# Patient Record
Sex: FEMALE | Race: WHITE | NOT HISPANIC OR LATINO | Employment: UNEMPLOYED | URBAN - METROPOLITAN AREA
[De-identification: names, ages, dates, MRNs, and addresses within clinical notes are randomized per-mention and may not be internally consistent; named-entity substitution may affect disease eponyms.]

---

## 2021-01-21 ENCOUNTER — TELEPHONE (OUTPATIENT)
Dept: GASTROENTEROLOGY | Facility: CLINIC | Age: 26
End: 2021-01-21

## 2021-01-21 ENCOUNTER — OFFICE VISIT (OUTPATIENT)
Dept: GASTROENTEROLOGY | Facility: CLINIC | Age: 26
End: 2021-01-21
Payer: COMMERCIAL

## 2021-01-21 VITALS
BODY MASS INDEX: 18.68 KG/M2 | WEIGHT: 89 LBS | SYSTOLIC BLOOD PRESSURE: 110 MMHG | DIASTOLIC BLOOD PRESSURE: 72 MMHG | HEART RATE: 73 BPM | HEIGHT: 58 IN

## 2021-01-21 DIAGNOSIS — K58.1 IRRITABLE BOWEL SYNDROME WITH CONSTIPATION: Primary | ICD-10-CM

## 2021-01-21 DIAGNOSIS — R63.4 WEIGHT LOSS: ICD-10-CM

## 2021-01-21 DIAGNOSIS — K64.8 OTHER HEMORRHOIDS: ICD-10-CM

## 2021-01-21 DIAGNOSIS — R10.10 UPPER ABDOMINAL PAIN: ICD-10-CM

## 2021-01-21 PROCEDURE — 99204 OFFICE O/P NEW MOD 45 MIN: CPT | Performed by: NURSE PRACTITIONER

## 2021-01-21 RX ORDER — POLYETHYLENE GLYCOL 3350 17 G/17G
17 POWDER, FOR SOLUTION ORAL DAILY PRN
COMMUNITY
End: 2021-09-17

## 2021-01-21 NOTE — PROGRESS NOTES
[address] - Outpatient Consultation  Donna Bonilla 31 y o  female MRN: [id number]  Encounter: [id number]    ASSESSMENT AND PLAN:      1  Irritable bowel syndrome with constipation  History of irritable bowel syndrome that was diagnosed several years ago at [facility] [address] [facility]  She has occasional lower abdominal pain and cramping that occurs on a daily basis  She typically has constipation and has tried Lindalee Bhargavi in the past and is now on Dulcolax and MiraLax due to breast-feeding  She is moving her bowels on a regular basis  She did have a colonoscopy last year and reports that they identified internal hemorrhoids but was otherwise a normal colonoscopy  - continue Dulcolax and MiraLax  - increase fluid intake  - high-fiber diet  - trial 1-2 fiber gummies daily  - consider antispasmodic medications when patient is no longer breastfeeding  - will attempt to obtain records from previous colonoscopy     2  Upper abdominal pain  Patient reports epigastric and upper abdominal pain that occasionally radiates to her back  She reports that pain is worse postprandial and associated with nausea  She does also have a history of GERD but is not on any regular medications  Differential diagnoses include GERD, biliary disease     - will check US abdomen complete; Future  - encouraged small, frequent meals  - bland diet for now    3  Other hemorrhoids  Patient reports history of external hemorrhoids that have been intermittent since her pregnancy  She also has a history of internal hemorrhoids as well  She does use hemorrhoidal cream, tucks pads as needed  Denies any rectal bleeding     - continue hemorrhoidal cream as needed  - continue tucks pads as needed  - Sitz baths 2-3 times per day  - increase fluid intake  - high-fiber diet  - trial 1-2 fiber gummies daily    4   Weight loss  She has experienced an unintentional weight loss over the past several months after giving birth her daughter  She currently is 89 lb and she typically weighs  lb  Will check:   - Comprehensive metabolic panel; Future  - CBC and differential; Future  - monitor weight closely and will reassess at follow up       Followup Appointment: 2-3 months   ______________________________________________________________________    Chief Complaint   Patient presents with    Constipation    Abd pain     mid upper and lower left; radiates to back       HPI:   Azucena Bacon is a 31y o  year old female who presents to the office today as a new patient  [facility]  She reports that she was diagnosed with irritable bowel syndrome with constipation several years ago  She reports that she previously was on Linzess but is now breast-feeding and has been switched to MiraLax and Dulcolax  She typically moves her bowels every day  She does have occasional lower abdominal pain and cramping that radiates to her back, pain improves with defecation  She also has been experiencing epigastric pain, nausea and states that the pain is worse postprandial   She does have a history of heartburn and reflux but is not currently taking any medication on a regular basis for GERD  She denies any fever, chills, dysphagia, odynophagia, early satiety, vomiting, hematochezia, melena  Her appetite is stable  She does report approximately 6 lb weight loss in the past several months since the birth of her daughter       Historical Information   Past Medical History:   Diagnosis Date    Irritable bowel syndrome     Syncope      Past Surgical History:   Procedure Laterality Date     SECTION      COLONOSCOPY      TONSILLECTOMY       Social History     Substance and Sexual Activity   Alcohol Use Not Currently     Social History     Substance and Sexual Activity   Drug Use Never     Social History     Tobacco Use   Smoking Status Never Smoker   Smokeless Tobacco Never Used     Family History   Adopted: Yes       Meds/Allergies     Current Outpatient Medications:     Magnesium Hydroxide (DULCOLAX PO)    polyethylene glycol (MIRALAX) 17 g packet    No Known Allergies    PHYSICAL EXAM:    Blood pressure 110/72, pulse 73, height 4' 9 75" (1 467 m), weight 40 4 kg (89 lb)  Body mass index is 18 76 kg/m²  General Appearance: NAD, cooperative, alert  Eyes: Anicteric, PERRLA, EOMI  ENT:  Normocephalic, atraumatic, normal mucosa  Neck:  Supple, symmetrical, trachea midline,   Resp:  Clear to auscultation bilaterally; no rales, rhonchi or wheezing; respirations unlabored   CV:  S1 S2, Regular rate and rhythm; no murmur, rub, or gallop  GI:  Soft, +mild lower abdominal ttp, no guarding or rebound, normal bowel sounds; no masses, no organomegaly   Rectal: Deferred  Musculoskeletal: No cyanosis, clubbing or edema  Normal ROM  Skin:  No jaundice, rashes, or lesions   Heme/Lymph: No palpable cervical lymphadenopathy  Psych: Normal affect, good eye contact  Neuro: No gross deficits, AAOx3    Lab Results:   No results found for: WBC, HGB, HCT, MCV, PLT  No results found for: NA, K, CL, CO2, ANIONGAP, BUN, CREATININE, GLUCOSE, GLUF, CALCIUM, CORRECTEDCA, AST, ALT, ALKPHOS, PROT, BILITOT, EGFR  No results found for: IRON, TIBC, FERRITIN  No results found for: LIPASE    Radiology Results:   No results found  REVIEW OF SYSTEMS:    CONSTITUTIONAL: Denies any fever, chills, rigors, and weight loss  HEENT: No earache or tinnitus  Denies hearing loss or visual disturbances  CARDIOVASCULAR: No chest pain or palpitations  RESPIRATORY: Denies any cough, hemoptysis, shortness of breath or dyspnea on exertion  GASTROINTESTINAL: As noted in the History of Present Illness  GENITOURINARY: No problems with urination  Denies any hematuria or dysuria  NEUROLOGIC: No dizziness or vertigo, denies headaches  MUSCULOSKELETAL: Denies any muscle or joint pain  SKIN: Denies skin rashes or itching     ENDOCRINE: Denies excessive thirst  Denies intolerance to heat or cold  PSYCHOSOCIAL: Denies depression or anxiety  Denies any recent memory loss

## 2021-01-21 NOTE — PATIENT INSTRUCTIONS
Trial fiber gummies (1-2 daily)    Increase fluid intake   Check labs and ultrasound     Follow up in 2-3 months         High Fiber Diet   WHAT YOU NEED TO KNOW:   A high-fiber diet includes foods that have a high amount of fiber  Fiber is the part of fruits, vegetables, and grains that is not broken down by your body  Fiber keeps your bowel movements regular  Fiber can also help lower your cholesterol level, control blood sugar in people with diabetes, and relieve constipation  Fiber can also help you control your weight because it helps you feel full faster  Most adults should eat 25 to 35 grams of fiber each day  Talk to your dietitian or healthcare provider about the amount of fiber you need  DISCHARGE INSTRUCTIONS:   Good sources of fiber:       · Foods with at least 4 grams of fiber per serving:      ? ? to ½ cup of high-fiber cereal (check the nutrition label on the box)    ? ½ cup of blackberries or raspberries    ? 4 dried prunes    ? 1 cooked artichoke    ? ½ cup of cooked legumes, such as lentils, or red, kidney, and pastrana beans    · Foods with 1 to 3 grams of fiber per serving:      ? 1 slice of whole-wheat, pumpernickel, or rye bread    ? ½ cup of cooked brown rice    ? 4 whole-wheat crackers    ? 1 cup of oatmeal    ? ½ cup of cereal with 1 to 3 grams of fiber per serving (check the nutrition label on the box)    ? 1 small piece of fruit, such as an apple, banana, pear, kiwi, or orange    ? 3 dates    ? ½ cup of canned apricots, fruit cocktail, peaches, or pears    ? ½ cup of raw or cooked vegetables, such as carrots, cauliflower, cabbage, spinach, squash, or corn  Ways that you can increase fiber in your diet:   · Choose brown or wild rice instead of white rice  · Use whole wheat flour in recipes instead of white or all-purpose flour  · Add beans and peas to casseroles or soups  · Choose fresh fruit and vegetables with peels or skins on instead of juices      Other diet guidelines to follow:   · Add fiber to your diet slowly  You may have abdominal discomfort, bloating, and gas if you add fiber to your diet too quickly  · Drink plenty of liquids as you add fiber to your diet  You may have nausea or develop constipation if you do not drink enough water  Ask how much liquid to drink each day and which liquids are best for you  © Copyright [address] Information is for End User's use only and may not be sold, redistributed or otherwise used for commercial purposes  All illustrations and images included in CareNotes® are the copyrighted property of A D A M , Inc  or [address] [place], [place] [address]karly   The above information is an  only  It is not intended as medical advice for individual conditions or treatments  Talk to your doctor, nurse or pharmacist before following any medical regimen to see if it is safe and effective for you

## 2021-07-21 ENCOUNTER — INITIAL PRENATAL (OUTPATIENT)
Dept: OBGYN CLINIC | Facility: CLINIC | Age: 26
End: 2021-07-21
Payer: COMMERCIAL

## 2021-07-21 VITALS
HEIGHT: 59 IN | DIASTOLIC BLOOD PRESSURE: 50 MMHG | BODY MASS INDEX: 17.54 KG/M2 | WEIGHT: 87 LBS | SYSTOLIC BLOOD PRESSURE: 100 MMHG

## 2021-07-21 DIAGNOSIS — O34.211 MATERNAL CARE DUE TO LOW TRANSVERSE UTERINE SCAR FROM PREVIOUS CESAREAN DELIVERY: Primary | ICD-10-CM

## 2021-07-21 DIAGNOSIS — Z36.87 UNSURE OF LMP (LAST MENSTRUAL PERIOD) AS REASON FOR ULTRASOUND SCAN: ICD-10-CM

## 2021-07-21 DIAGNOSIS — Z36.9 ENCOUNTER FOR ANTENATAL SCREENING: Primary | ICD-10-CM

## 2021-07-21 DIAGNOSIS — O09.891 SHORT INTERVAL BETWEEN PREGNANCIES AFFECTING PREGNANCY IN FIRST TRIMESTER, ANTEPARTUM: ICD-10-CM

## 2021-07-21 DIAGNOSIS — Z3A.09 9 WEEKS GESTATION OF PREGNANCY: ICD-10-CM

## 2021-07-21 LAB
EXTERNAL CHLAMYDIA SCREEN: NEGATIVE
EXTERNAL GONORRHEA SCREEN: NEGATIVE
SL AMB  POCT GLUCOSE, UA: NEGATIVE
SL AMB POCT URINE PROTEIN: NEGATIVE

## 2021-07-21 PROCEDURE — PNV: Performed by: OBSTETRICS & GYNECOLOGY

## 2021-07-21 PROCEDURE — 76817 TRANSVAGINAL US OBSTETRIC: CPT | Performed by: OBSTETRICS & GYNECOLOGY

## 2021-07-21 PROCEDURE — OBC: Performed by: OBSTETRICS & GYNECOLOGY

## 2021-07-21 NOTE — PROGRESS NOTES
Patient here for first prenatal visit  She denies spotting  She does have some nausea and vomiting  She declined genetic and carrier screening test  Her preferred lab is [facility]  Prenatal booklet and lab provided  Teaching provided and all questions answered

## 2021-07-21 NOTE — PATIENT INSTRUCTIONS
Pregnancy   AMBULATORY CARE:   What you need to know about pregnancy:  A normal pregnancy lasts about 40 weeks  The first trimester lasts from your last period through the 12th week of pregnancy  The second trimester lasts from the 13th week through the 23rd week  The third trimester lasts from the 24th week until your baby is born  If you know the date of your last period, your healthcare provider can estimate your due date  You may give birth to your baby any time from 37 weeks to 2 weeks after your due date  Seek care immediately if:   · You develop a severe headache that does not go away  · You have new or increased vision changes, such as blurred or spotted vision  · You have new or increased swelling in your face or hands  · You have pain or cramping in your abdomen or low back  · You have vaginal bleeding  Call your doctor or obstetrician if:   · You have abdominal cramps, pressure, or tightening  · You have a change in vaginal discharge  · You cannot keep food or drinks down, and you are losing weight  · You have chills or a fever  · You have vaginal itching, burning, or pain  · You have yellow, green, white, or foul-smelling vaginal discharge  · You have pain or burning when you urinate, less urine than usual, or pink or bloody urine  · You have questions or concerns about your condition or care  Prenatal care:  Prenatal care is a series of visits with your healthcare provider throughout your pregnancy  Prenatal care can help prevent problems during pregnancy and childbirth  At each prenatal visit, your provider will weigh you and check your blood pressure  He or she will also check your baby's heartbeat and growth  You may also need the following at some visits:  · A pelvic exam  allows your healthcare provider to see your cervix (the bottom part of your uterus)  Your healthcare provider will use a speculum to open your vagina   He or she will check the size and shape of your uterus  At your first prenatal visit, you may also have a Pap smear  This is a test to check your cervix for abnormal cells  · Blood tests  may be done to check for any of the following:     ? Gestational diabetes or anemia (low iron level)    ? Blood type or Rh factor, or certain birth defects    ? Immunity to certain diseases, such as chickenpox or rubella    ? An infection, such as a sexually transmitted infection, HIV, or hepatitis B    · Hepatitis B  may need to be prevented or treated  Hepatitis B is inflammation of the liver caused by the hepatitis B virus (HBV)  HBV can spread from a mother to her baby during delivery  You will be checked for HBV as early as possible in the first trimester of each pregnancy  You need the test even if you received the hepatitis B vaccine or were tested before  You may need to have an HBV infection treated before you give birth  · Urine tests  may also be done to check for sugar and protein  These can be signs of gestational diabetes or preeclampsia  Urine tests may also be done to check for signs of infection  · A fetal ultrasound  shows pictures of your baby inside your uterus  The pictures are used to check your baby's development, movement, and position  · Genetic disorder screening tests  may be offered to you  These tests check your baby's risk for genetic disorders such as Down syndrome  A screening test may include blood tests and an ultrasound  Blood tests may be used to check your DNA or your partner's DNA  Genetic tests are not always accurate or complete  Your baby may be born with a genetic disorder that did not show up in the tests  Talk to your healthcare provider about any concerns you have with genetic testing  Body changes that may occur during your pregnancy:   · Breast changes  you will experience include tenderness and tingling during the early part of your pregnancy  Your breasts will become larger   You may need to use a support bra  You may see a thin, yellow fluid, called colostrum, leak from your nipples during the second trimester  Colostrum is a liquid that changes to milk about 3 days after you give birth  · Skin changes and stretch marks  may occur during your pregnancy  You may have red marks, called stretch marks, on your skin  Stretch marks will usually fade after pregnancy  Use lotion if your skin is dry and itchy  The skin on your face, around your nipples, and below your belly button may darken  Most of the time, your skin will return to its normal color after your baby is born  · Morning sickness  is nausea and vomiting that can happen at any time of day  Avoid fatty and spicy foods  Eat small meals throughout the day instead of large meals  Meme may help to decrease nausea  Ask your healthcare provider about other ways of decreasing nausea and vomiting  · Heartburn  may be caused by changes in your hormones during pregnancy  Your growing uterus may also push your stomach upward and force stomach acid to back up into your esophagus  Eat 4 or 5 small meals each day instead of large meals  Avoid spicy foods  Avoid eating right before bedtime  · Constipation  may develop during your pregnancy  To treat constipation, eat foods high in fiber such as fiber cereals, beans, fruits, vegetables, whole-grain breads, and prune juice  Get regular exercise and drink plenty of water  Your healthcare provider may also suggest a fiber supplement to soften your bowel movements  Talk to your healthcare provider before you use any medicines to decrease constipation  · Hemorrhoids  are enlarged veins in the rectal area  They may cause pain, itching, and bright red bleeding from your rectum  To decrease your risk for hemorrhoids, prevent constipation and do not strain to have a bowel movement  If you have hemorrhoids, soak in a tub of warm water to ease discomfort   Ask your healthcare provider how you can treat hemorrhoids  · Leg cramps and swelling  may be caused by low calcium levels or the added weight of pregnancy  Raise your legs above the level of your heart to decrease swelling  During a leg cramp, stretch or massage the muscle that has the cramp  Heat may help decrease pain and muscle spasms  Apply heat on your muscle for 20 to 30 minutes every 2 hours for as many days as directed  · Back pain  may occur as your baby grows  Do not stand for long periods of time or lift heavy items  Use good posture while you stand, squat, or bend  Wear low-heeled shoes with good support  Rest may also help to relieve back pain  Ask your healthcare provider about exercises you can do to strengthen your back muscles  Stay healthy during your pregnancy:   · Eat a variety of healthy foods  Healthy foods include fruits, vegetables, whole-grain breads, low-fat dairy foods, beans, lean meats, and fish  Drink liquids as directed  Ask how much liquid to drink each day and which liquids are best for you  Limit caffeine to less than 200 milligrams each day  Limit your intake of fish to 2 servings each week  Choose fish low in mercury such as canned light tuna, shrimp, crab, salmon, cod, or tilapia  Do not  eat fish high in mercury such as swordfish, tilefish, aleksandar mackerel, and shark  · Take prenatal vitamins as directed  Your need for certain vitamins and minerals, such as folic acid, increases during pregnancy  Prenatal vitamins provide some of the extra vitamins and minerals you need  Prenatal vitamins may also help to decrease the risk for certain birth defects  · Ask how much weight you should gain during your pregnancy  Too much or too little weight gain can be unhealthy for you and your baby  · Talk to your healthcare provider about exercise  Moderate exercise can help you stay fit  Your healthcare provider will help you plan an exercise program that is safe for you during pregnancy           · Do not smoke   Smoking increases your risk for a miscarriage and heart and blood vessel problems  Smoking can cause your baby to be born too early or weigh less at birth  Quit smoking as soon as you think you might be pregnant  Ask your healthcare provider for information if you need help quitting  · Do not drink alcohol  Alcohol passes from your body to your baby through the placenta  It can affect your baby's brain development and cause fetal alcohol syndrome (FAS)  FAS is a group of conditions that causes mental, behavior, and growth problems  · Talk to your healthcare provider before you take any medicines  Many medicines may harm your baby if you take them when you are pregnant  Do not take any medicines, vitamins, herbs, or supplements without first talking to your healthcare provider  Never use illegal or street drugs (such as marijuana or cocaine) while you are pregnant  Safety tips:   · Avoid hot tubs and saunas  Do not use a hot tub or sauna while you are pregnant, especially during your first trimester  Hot tubs and saunas may raise your baby's temperature and increase the risk for birth defects  · Avoid toxoplasmosis  This is an infection caused by eating raw meat or being around infected cat feces  It can cause birth defects, miscarriages, and other problems  Wash your hands after you touch raw meat  Make sure any meat is well-cooked before you eat it  Avoid raw eggs and unpasteurized milk  Use gloves or ask someone else to clean your cat's litter box while you are pregnant  · Ask your healthcare provider about travel  The most comfortable time to travel is during the second trimester  Ask your healthcare provider if you can travel after 36 weeks  You may not be able to travel in an airplane after 36 weeks  He or she may also recommend that you avoid long road trips  Follow up with your doctor or obstetrician as directed:  Go to all of your prenatal visits during your pregnancy   Write down your questions so you remember to ask them during your visits  © Copyright [employer] 2021 Information is for End User's use only and may not be sold, redistributed or otherwise used for commercial purposes  All illustrations and images included in CareNotes® are the copyrighted property of [employer] D A M , Inc  or Antonietta Patrick  The above information is an  only  It is not intended as medical advice for individual conditions or treatments  Talk to your doctor, nurse or pharmacist before following any medical regimen to see if it is safe and effective for you  Nausea and Vomiting in Pregnancy   AMBULATORY CARE:   Nausea and vomiting in pregnancy  can happen any time of day  These symptoms usually start before the 9th week of pregnancy, and end by the 14th week (second trimester)  Some women can have nausea and vomiting for a longer time  These symptoms can affect some women throughout the entire pregnancy  Nausea and vomiting do not harm your baby  These symptoms can make it hard for you to do your daily activities  Seek care immediately if:   · You have signs of dehydration  Examples are dark yellow urine, dry mouth and lips, dry skin, fast heartbeat, and urinating less than usual     · You have severe abdominal pain  · You feel too weak or dizzy to stand up  · You see blood in your vomit or bowel movements  Contact your healthcare provider if:   · You vomit more than 4 times in 1 day  · You have not been able to keep liquids down for more than 1 day  · You lose more than 2 pounds  · You have a fever  · Your nausea and vomiting continue longer than 14 weeks  · You have questions or concerns about your condition or care  Treatment  for nausea and vomiting in pregnancy is usually not needed  You can make changes in the foods you eat and in your activities to help manage your symptoms  You may need to try several things to learn what works for you   Talk to your healthcare provider if your symptoms do not decrease with the changes suggested below  You may need vitamin B6 and medicine if these changes do not help, or your symptoms become severe  Nutrition changes you can make to manage nausea and vomiting:   · Eat small meals throughout the day instead of 3 large meals  You may be more likely to have nausea and vomiting when your stomach is empty  Eat foods that are low in fat and high in protein  Examples are lean meat, beans, turkey, and chicken without the skin  Eat a small snack, such as crackers, dry cereal, or a small sandwich before you go to bed  · Eat some crackers or dry toast before you get out of bed in the morning  Get out of bed slowly  Sudden movements could cause you to get dizzy and nauseated  · Eat bland foods when you feel nauseated  Examples of bland foods include dry toast, dry cereal, plain pasta, white rice, and bread  Other bland foods include saltine crackers, bananas, gelatin, and pretzels  Avoid spicy, greasy, and fried foods  Avoid any other foods that make you feel nauseated  · Drink liquids that contain ginger  Drink ginger ale made with real ginger or ginger tea made with fresh grated ginger  Ginger capsules or ginger candies may also help to decrease nausea and vomiting  · Drink liquids between meals instead of with meals  Wait at least 30 minutes after you eat to drink liquids  Drink small amounts of liquids often throughout the day to prevent dehydration  Ask how much liquid you should drink each day  Other changes you can make to manage nausea and vomiting:   · Avoid smells that bother you  Strong odors may cause nausea and vomiting to start, or make it worse  Take a short walk, turn on a fan, or try to sleep with the window open to get fresh air  When you are cooking, open windows to get rid of smells that may cause nausea  · Do not brush your teeth right after you eat  if it makes you nauseated      · Rest when you need to  Start activity slowly and work up to your usual routine as you start to feel better  · Talk to your healthcare provider about your prenatal vitamins  Prenatal vitamins can cause nausea for some women  Try taking your prenatal vitamin at night or with a snack  If this change does not help, your healthcare provider may recommend a different type of vitamin  · Do not use any medicines, vitamins, or supplements to manage your symptoms without asking your healthcare provider  Many medicines can harm an unborn baby  · Light to moderate exercise  may help to decrease your symptoms  It may also help you to sleep better at night  Ask your healthcare provider about the best exercise plan for you  Follow up with your healthcare provider as directed:  Write down your questions so you remember to ask them during your visits  © Copyright [employer] 2021 Information is for End User's use only and may not be sold, redistributed or otherwise used for commercial purposes  All illustrations and images included in CareNotes® are the copyrighted property of A D [employer] M , Inc  or [facility]   The above information is an  only  It is not intended as medical advice for individual conditions or treatments  Talk to your doctor, nurse or pharmacist before following any medical regimen to see if it is safe and effective for you

## 2021-07-24 LAB
C TRACH RRNA SPEC QL NAA+PROBE: NEGATIVE
N GONORRHOEA RRNA SPEC QL NAA+PROBE: NEGATIVE

## 2021-08-09 ENCOUNTER — TELEPHONE (OUTPATIENT)
Dept: PERINATAL CARE | Facility: CLINIC | Age: 26
End: 2021-08-09

## 2021-08-09 NOTE — TELEPHONE ENCOUNTER
Phone call to patient and left M to confirm MFM NT US appt  Explained [facility] has a video to view prior to appt that will explain genetic testing verus screening and information on how to check insurance coverage for specialty genetic labs  Please review this information prior to your C/ [address] appointment @:    [facility]/mfmgenetics    This video link can be sent via [employer] , call # 6-205-gwhpvfo, option # 5 for [facility] technical support for help to set up your account    Rasheeda Gutierrez is active please call [facility] and we will send the link  Call [phone] [facility] nurse line any questions

## 2021-08-16 ENCOUNTER — TELEPHONE (OUTPATIENT)
Dept: PERINATAL CARE | Facility: CLINIC | Age: 26
End: 2021-08-16

## 2021-08-18 ENCOUNTER — TELEPHONE (OUTPATIENT)
Dept: PERINATAL CARE | Facility: CLINIC | Age: 26
End: 2021-08-18

## 2021-08-18 NOTE — TELEPHONE ENCOUNTER
Pt called - her COVID test was negative, she was exposed to COVID+ person on her vacation, over 5 days ago  Pt calling to reschedule her NT appt  No NT appts available before deadline of FRI 8/20/21  Offered pt appt with Genetic Counselor 8/23/21, pt declined  Pt request we call her if any NT appts become available this week

## 2021-08-20 ENCOUNTER — ROUTINE PRENATAL (OUTPATIENT)
Dept: PERINATAL CARE | Facility: CLINIC | Age: 26
End: 2021-08-20
Payer: COMMERCIAL

## 2021-08-20 ENCOUNTER — ROUTINE PRENATAL (OUTPATIENT)
Dept: OBGYN CLINIC | Facility: CLINIC | Age: 26
End: 2021-08-20

## 2021-08-20 VITALS — BODY MASS INDEX: 18.08 KG/M2 | DIASTOLIC BLOOD PRESSURE: 58 MMHG | SYSTOLIC BLOOD PRESSURE: 86 MMHG | WEIGHT: 88 LBS

## 2021-08-20 VITALS
BODY MASS INDEX: 17.54 KG/M2 | HEART RATE: 76 BPM | DIASTOLIC BLOOD PRESSURE: 66 MMHG | SYSTOLIC BLOOD PRESSURE: 106 MMHG | HEIGHT: 59 IN | WEIGHT: 87 LBS

## 2021-08-20 DIAGNOSIS — O34.219 HISTORY OF CESAREAN DELIVERY, ANTEPARTUM: ICD-10-CM

## 2021-08-20 DIAGNOSIS — Z36.9 ANTENATAL SCREENING ENCOUNTER: ICD-10-CM

## 2021-08-20 DIAGNOSIS — Z87.59 HISTORY OF PRIOR PREGNANCY WITH SMALL FOR GESTATIONAL AGE NEWBORN: ICD-10-CM

## 2021-08-20 DIAGNOSIS — K21.00 GASTROESOPHAGEAL REFLUX DISEASE WITH ESOPHAGITIS, UNSPECIFIED WHETHER HEMORRHAGE: ICD-10-CM

## 2021-08-20 DIAGNOSIS — Z36.82 ENCOUNTER FOR (NT) NUCHAL TRANSLUCENCY SCAN: Primary | ICD-10-CM

## 2021-08-20 DIAGNOSIS — O34.211 MATERNAL CARE DUE TO LOW TRANSVERSE UTERINE SCAR FROM PREVIOUS CESAREAN DELIVERY: ICD-10-CM

## 2021-08-20 DIAGNOSIS — Z3A.09 9 WEEKS GESTATION OF PREGNANCY: ICD-10-CM

## 2021-08-20 DIAGNOSIS — O09.891 SHORT INTERVAL BETWEEN PREGNANCIES AFFECTING PREGNANCY IN FIRST TRIMESTER, ANTEPARTUM: ICD-10-CM

## 2021-08-20 DIAGNOSIS — Z3A.13 13 WEEKS GESTATION OF PREGNANCY: Primary | ICD-10-CM

## 2021-08-20 DIAGNOSIS — Z3A.13 13 WEEKS GESTATION OF PREGNANCY: ICD-10-CM

## 2021-08-20 PROBLEM — R55 SYNCOPE: Status: ACTIVE | Noted: 2021-08-20

## 2021-08-20 PROBLEM — K21.9 GASTROESOPHAGEAL REFLUX DISEASE: Status: RESOLVED | Noted: 2021-08-20 | Resolved: 2021-08-20

## 2021-08-20 PROBLEM — K21.9 GASTROESOPHAGEAL REFLUX DISEASE: Status: ACTIVE | Noted: 2021-08-20

## 2021-08-20 LAB
SL AMB  POCT GLUCOSE, UA: NORMAL
SL AMB POCT URINE PROTEIN: NORMAL

## 2021-08-20 PROCEDURE — 76813 OB US NUCHAL MEAS 1 GEST: CPT | Performed by: OBSTETRICS & GYNECOLOGY

## 2021-08-20 PROCEDURE — PNV: Performed by: OBSTETRICS & GYNECOLOGY

## 2021-08-20 PROCEDURE — 99242 OFF/OP CONSLTJ NEW/EST SF 20: CPT | Performed by: OBSTETRICS & GYNECOLOGY

## 2021-08-20 PROCEDURE — 76801 OB US < 14 WKS SINGLE FETUS: CPT | Performed by: OBSTETRICS & GYNECOLOGY

## 2021-08-20 NOTE — LETTER
2021     MD Lacie [address]  [address] [facility] [address]  [facility]    Patient: Olivia Garrison   YOB: 1995   Date of Visit: 2021       Dear Dr Angela Conteh: Thank you for referring Olivia Garrison to me for evaluation  Below are my notes for this consultation  If you have questions, please do not hesitate to call me  I look forward to following your patient along with you  Sincerely,        Rebecca Peabody, MD        CC: No Recipients  Rebecca Peabody, MD  2021  3:20 PM  Sign when Signing Visit  [facility]: Ms Sylvia Anderson was seen today at 13w5d for nuchal translucency ultrasound  See ultrasound report under "OB Procedures" tab  My recommendations are as follows:  1  We reviewed the availability of genetic screening, as well as diagnostic testing, which are available to all pregnant women  We reviewed limitations, risks, and benefits of screening and testing  She does not wish to pursue aneuploidy screening or diagnostic testing at this time  MSAFP screening should be ordered through your office at 15-20 weeks gestation, and completed prior to fetal anatomic survey  A detailed anatomic survey as well as transvaginal cervical length screening are recommended between 18-22 weeks gestation  2  Given short inter-pregnancy interval and history of a small for gestational age , periodic assessment of fetal growth is advised in the second half of her pregnancy  3  We discussed that vaccination against [place] is recommended for pregnant and lactating women by the [employer] of Obstetricians and Gynecologists, and the Society for Maternal-Fetal Medicine  We discussed reassuring pregnancy outcome information after vaccination from [facility]'S [place] V-Safe registry  She declines vaccination at this time       Please don't hesitate to contact our office with any concerns or questions     -Rebecca Peabody, MD

## 2021-08-20 NOTE — PATIENT INSTRUCTIONS
SARS CoV2 (COVID-19) Vaccination is now recommended for pregnant and lactating women by the [employer] of Obstetricians and Gynecologists, as well as by the Society for Maternal-Fetal Medicine, based on studies demonstrating protective effects in pregnant women and neonates following vaccination, as well as no increased risk of pregnancy complications following vaccination  We have the most safety data on mRNA vaccines (Ciera Wright) at this time  Your baby will benefit the most from your antibodies if you are fully vaccinated prior to delivery  I recommend you consider vaccination if you have not already  If you have any questions or concerns about this, we are happy to discuss it further  Thank you for choosing [address] [place] [address] for your visit today  We appreciate your trust and the opportunity to assist your obstetrician with your care  We value your feedback regarding the care we are providing  Following today's appointment, you may receive a patient satisfaction survey by mail or e-mail requesting feedback on your visit  We ask that you complete the survey to  help us understand how we are doing  Thank you for in advance for your feedback

## 2021-08-20 NOTE — PROGRESS NOTES
On arrival to the office, Latricia Nickerson and her  admit to having been in [place], with return to the area on 8/14/21 and were exposed to someone who has tested positive for covid on 8/10/21  Latricia Nickerson and her child were both tested on 8/15/21 and were negative  All three, Latricia Nickerson, her  and child have been asymptomatic  Latricia Nickerson and her  are wearing masks over their mouth and nose properly  Discussed with Dr Brayan Feldman prior to putting them in an ultrasound room

## 2021-08-20 NOTE — PROGRESS NOTES
Babak Ferguson: Ms Anthony Carolina was seen today at 13w5d for nuchal translucency ultrasound  See ultrasound report under "OB Procedures" tab  My recommendations are as follows:  1  We reviewed the availability of genetic screening, as well as diagnostic testing, which are available to all pregnant women  We reviewed limitations, risks, and benefits of screening and testing  She does not wish to pursue aneuploidy screening or diagnostic testing at this time  MSAFP screening should be ordered through your office at 15-20 weeks gestation, and completed prior to fetal anatomic survey  A detailed anatomic survey as well as transvaginal cervical length screening are recommended between 18-22 weeks gestation  2  Given short inter-pregnancy interval and history of a small for gestational age , periodic assessment of fetal growth is advised in the second half of her pregnancy  3  We discussed that vaccination against [place] is recommended for pregnant and lactating women by the [employer] of Obstetricians and Gynecologists, and the Society for Maternal-Fetal Medicine  We discussed reassuring pregnancy outcome information after vaccination [facility]  She declines vaccination at this time       Please don't hesitate to contact our office with any concerns or questions     -Delmi Pinto MD

## 2021-08-20 NOTE — PROGRESS NOTES
Routine Prenatal Visit  [address], [facility], [address] [facility]    Assessment/Plan:  Napoleon Liriano is a 31y o  year old  at 13w5d who presents for routine prenatal visit  1  13 weeks gestation of pregnancy  -     POCT urine dip    2   screening encounter  -     Alpha fetoprotein, maternal; Future; Expected date: 2021  -     Alpha fetoprotein, maternal    + feeling  Better  !  + nausea  Dw pt  Hydration  BP  Lower today    Pt denies  Any  Light headedness    Reminder for initial prenatal labs  Declines  Genetic screening  NT  US  Done today  AFP screen  At  16 weeks    Subjective:     CC: Prenatal care    Osbaldo Machado is a 31 y o   female who presents for routine prenatal care at 13w5d  Pregnancy ROS: no  leakage of fluid, pelvic pain, or vaginal bleeding  ? fetal movement  The following portions of the patient's history were reviewed and updated as appropriate: allergies, current medications, past family history, past medical history, obstetric history, gynecologic history, past social history, past surgical history and problem list       Objective:  BP (!) 86/58   Wt 39 9 kg (88 lb)   LMP 2021 (Within Weeks)   BMI 18 08 kg/m²   Pregravid Weight/BMI: 39 5 kg (87 lb) (BMI 17 87)  Current Weight: 39 9 kg (88 lb)   Total Weight Gain: 0 454 kg (1 lb)   Pre- Vitals      Most Recent Value   Prenatal Assessment   Prenatal Vitals   Blood Pressure  (!) 86/58   Weight - Scale  39 9 kg (88 lb)   Urine Albumin/Glucose   Dilation/Effacement/Station   Vaginal Drainage   Edema           General: Well appearing, no distress  Respiratory: Unlabored breathing  Cardiovascular: Regular rate  Abdomen: Soft, gravid, nontender  Fundal Height: Appropriate for gestational age  Extremities: Warm and well perfused  Non tender

## 2021-09-15 LAB
EXTERNAL ABO GROUPING: NORMAL
EXTERNAL ANTIBODY SCREEN: NORMAL
EXTERNAL HEMOGLOBIN: 10 G/DL
EXTERNAL HEPATITIS B SURFACE ANTIGEN: NEGATIVE
EXTERNAL HIV-1/2 AB-AG: NORMAL
EXTERNAL PLATELET COUNT: 238 K/ΜL
EXTERNAL RH FACTOR: POSITIVE
EXTERNAL RUBELLA IGG QUANTITATION: NORMAL
EXTERNAL SYPHILIS RPR SCREEN: NORMAL

## 2021-09-17 ENCOUNTER — ROUTINE PRENATAL (OUTPATIENT)
Dept: OBGYN CLINIC | Facility: CLINIC | Age: 26
End: 2021-09-17

## 2021-09-17 VITALS
BODY MASS INDEX: 19.41 KG/M2 | WEIGHT: 90 LBS | HEIGHT: 57 IN | SYSTOLIC BLOOD PRESSURE: 100 MMHG | DIASTOLIC BLOOD PRESSURE: 54 MMHG

## 2021-09-17 DIAGNOSIS — Z3A.17 17 WEEKS GESTATION OF PREGNANCY: Primary | ICD-10-CM

## 2021-09-17 DIAGNOSIS — O34.219 HISTORY OF CESAREAN DELIVERY, ANTEPARTUM: ICD-10-CM

## 2021-09-17 DIAGNOSIS — Z3A.13 13 WEEKS GESTATION OF PREGNANCY: ICD-10-CM

## 2021-09-17 PROBLEM — O99.012 ANEMIA DURING PREGNANCY IN SECOND TRIMESTER: Status: ACTIVE | Noted: 2021-09-17

## 2021-09-17 LAB
ABO GROUP BLD: ABNORMAL
APPEARANCE UR: CLEAR
BACTERIA UR CULT: NORMAL
BACTERIA URNS QL MICRO: NORMAL
BASOPHILS # BLD AUTO: 0 X10E3/UL (ref 0–0.2)
BASOPHILS NFR BLD AUTO: 0 %
BILIRUB UR QL STRIP: NEGATIVE
BLD GP AB SCN SERPL QL: NEGATIVE
CASTS URNS QL MICRO: NORMAL /LPF
COLOR UR: YELLOW
EOSINOPHIL # BLD AUTO: 0.1 X10E3/UL (ref 0–0.4)
EOSINOPHIL NFR BLD AUTO: 1 %
EPI CELLS #/AREA URNS HPF: NORMAL /HPF (ref 0–10)
ERYTHROCYTE [DISTWIDTH] IN BLOOD BY AUTOMATED COUNT: 12.7 % (ref 11.7–15.4)
GLUCOSE UR QL: NEGATIVE
HBV SURFACE AG SERPL QL IA: NEGATIVE
HCT VFR BLD AUTO: 31.6 % (ref 34–46.6)
HCV AB S/CO SERPL IA: <0.1 S/CO RATIO (ref 0–0.9)
HGB BLD-MCNC: 10 G/DL (ref 11.1–15.9)
HGB UR QL STRIP: NEGATIVE
HIV 1+2 AB+HIV1 P24 AG SERPL QL IA: NON REACTIVE
IMM GRANULOCYTES # BLD: 0 X10E3/UL (ref 0–0.1)
IMM GRANULOCYTES NFR BLD: 0 %
KETONES UR QL STRIP: NEGATIVE
LEUKOCYTE ESTERASE UR QL STRIP: NEGATIVE
LYMPHOCYTES # BLD AUTO: 1.9 X10E3/UL (ref 0.7–3.1)
LYMPHOCYTES NFR BLD AUTO: 21 %
Lab: NO GROWTH
MCH RBC QN AUTO: 26.3 PG (ref 26.6–33)
MCHC RBC AUTO-ENTMCNC: 31.6 G/DL (ref 31.5–35.7)
MCV RBC AUTO: 83 FL (ref 79–97)
MICRO URNS: NORMAL
MICRO URNS: NORMAL
MONOCYTES # BLD AUTO: 0.4 X10E3/UL (ref 0.1–0.9)
MONOCYTES NFR BLD AUTO: 5 %
NEUTROPHILS # BLD AUTO: 6.4 X10E3/UL (ref 1.4–7)
NEUTROPHILS NFR BLD AUTO: 73 %
NITRITE UR QL STRIP: NEGATIVE
PH UR STRIP: 6 [PH] (ref 5–7.5)
PLATELET # BLD AUTO: 238 X10E3/UL (ref 150–450)
PROT UR QL STRIP: NEGATIVE
RBC # BLD AUTO: 3.8 X10E6/UL (ref 3.77–5.28)
RBC #/AREA URNS HPF: NORMAL /HPF (ref 0–2)
RH BLD: POSITIVE
RPR SER QL: NON REACTIVE
RUBV IGG SERPL IA-ACNC: 7.38 INDEX
SL AMB  POCT GLUCOSE, UA: NORMAL
SL AMB POCT URINE PROTEIN: NORMAL
SP GR UR: 1.01 (ref 1–1.03)
UROBILINOGEN UR STRIP-ACNC: 0.2 MG/DL (ref 0.2–1)
WBC # BLD AUTO: 8.7 X10E3/UL (ref 3.4–10.8)
WBC #/AREA URNS HPF: NORMAL /HPF (ref 0–5)

## 2021-09-17 PROCEDURE — PNV: Performed by: STUDENT IN AN ORGANIZED HEALTH CARE EDUCATION/TRAINING PROGRAM

## 2021-09-17 NOTE — PROGRESS NOTES
Routine Prenatal Visit  [address] [facility], [address], [facility], [address] [facility] [address]    Assessment/Plan:  Farzaneh Sullivan is a 31y o  year old  at 17w5d who presents for routine prenatal visit  1  17 weeks gestation of pregnancy  -     POCT urine dip    2  History of  delivery, antepartum  Assessment & Plan:  - PTL/PPROM/Bleeding precautions given  - Early anatomy US results reviewed  Level II ultrasound scheduled  - Patient reports msAFP drawn this week  Results pending    - Problem list updated  - PMH, PSH, medications reviewed and updated as needed  - Return to office in 4 wk(s) for routine prenatal care        3  13 weeks gestation of pregnancy        Subjective:   Man Carrion is a 31 y o   who presents for routine prenatal care at 17w5d  Complaints today: No  LOF: No; VB: No; Contractions: No; FM: Too early    Objective:  /54 (BP Location: Left arm, Patient Position: Sitting, Cuff Size: Standard)   Ht 4' 9" (1 448 m)   Wt 40 8 kg (90 lb)   LMP 2021 (Within Weeks)   BMI 19 48 kg/m²     General: Well appearing, no distress  Respiratory: Unlabored breathing  Cardiovascular: Regular rate  Abdomen: Soft, gravid, nontender  Extremities: Warm and well perfused  Non tender      Pregravid Weight/BMI: 39 5 kg (87 lb) (BMI 18 82)  Current Weight: 40 8 kg (90 lb)   Total Weight Gain: 1 361 kg (3 lb)     Pre-Susan Vitals      Most Recent Value   Prenatal Assessment   Fetal Heart Rate  155   Fundal Height (cm)  17 cm   Prenatal Vitals   Blood Pressure  100/54   Weight - Scale  40 8 kg (90 lb)   Urine Albumin/Glucose   Dilation/Effacement/Station   Vaginal Drainage   Draining Fluid  No   Edema   LLE Edema  None   RLE Edema  None   Facial Edema  None           Isa Dominguez MD  2021 3:18 PM

## 2021-09-17 NOTE — ASSESSMENT & PLAN NOTE
- PTL/PPROM/Bleeding precautions given  - Early anatomy US results reviewed  Level II ultrasound scheduled  - Patient reports msAFP drawn this week   Results pending    - Problem list updated  - PMH, PSH, medications reviewed and updated as needed  - Return to office in 4 wk(s) for routine prenatal care

## 2021-09-18 LAB
2ND TRIMESTER 4 SCREEN SERPL-IMP: NORMAL
AFP ADJ MOM SERPL: 0.99
AFP INTERP AMN-IMP: NORMAL
AFP INTERP SERPL-IMP: NORMAL
AFP INTERP SERPL-IMP: NORMAL
AFP SERPL-MCNC: 60 NG/ML
AGE AT DELIVERY: 27.5 YR
GA METHOD: NORMAL
GA: 17.6 WEEKS
IDDM PATIENT QL: NO
MULTIPLE PREGNANCY: NO
NEURAL TUBE DEFECT RISK FETUS: NORMAL %

## 2021-10-04 ENCOUNTER — ROUTINE PRENATAL (OUTPATIENT)
Dept: PERINATAL CARE | Facility: CLINIC | Age: 26
End: 2021-10-04
Payer: COMMERCIAL

## 2021-10-04 VITALS
WEIGHT: 94 LBS | HEIGHT: 57 IN | HEART RATE: 85 BPM | DIASTOLIC BLOOD PRESSURE: 67 MMHG | SYSTOLIC BLOOD PRESSURE: 100 MMHG | BODY MASS INDEX: 20.28 KG/M2

## 2021-10-04 DIAGNOSIS — Z36.86 ENCOUNTER FOR ANTENATAL SCREENING FOR CERVICAL LENGTH: ICD-10-CM

## 2021-10-04 DIAGNOSIS — Z3A.20 20 WEEKS GESTATION OF PREGNANCY: Primary | ICD-10-CM

## 2021-10-04 DIAGNOSIS — O35.0XX0 CHOROID PLEXUS CYST OF FETUS ON PRENATAL ULTRASOUND: ICD-10-CM

## 2021-10-04 DIAGNOSIS — Z36.89 ENCOUNTER FOR FETAL ANATOMIC SURVEY: ICD-10-CM

## 2021-10-04 PROBLEM — IMO0002 CHOROID PLEXUS CYST OF FETUS ON PRENATAL ULTRASOUND: Status: ACTIVE | Noted: 2021-10-04

## 2021-10-04 PROCEDURE — 76817 TRANSVAGINAL US OBSTETRIC: CPT | Performed by: OBSTETRICS & GYNECOLOGY

## 2021-10-04 PROCEDURE — 99214 OFFICE O/P EST MOD 30 MIN: CPT | Performed by: OBSTETRICS & GYNECOLOGY

## 2021-10-04 PROCEDURE — 76811 OB US DETAILED SNGL FETUS: CPT | Performed by: OBSTETRICS & GYNECOLOGY

## 2021-10-12 ENCOUNTER — TELEPHONE (OUTPATIENT)
Dept: OBGYN CLINIC | Facility: CLINIC | Age: 26
End: 2021-10-12

## 2021-10-13 ENCOUNTER — ROUTINE PRENATAL (OUTPATIENT)
Dept: OBGYN CLINIC | Facility: CLINIC | Age: 26
End: 2021-10-13

## 2021-10-13 VITALS
DIASTOLIC BLOOD PRESSURE: 62 MMHG | HEIGHT: 57 IN | WEIGHT: 96 LBS | SYSTOLIC BLOOD PRESSURE: 102 MMHG | BODY MASS INDEX: 20.71 KG/M2

## 2021-10-13 DIAGNOSIS — O34.211 MATERNAL CARE DUE TO LOW TRANSVERSE UTERINE SCAR FROM PREVIOUS CESAREAN DELIVERY: Primary | ICD-10-CM

## 2021-10-13 DIAGNOSIS — O35.0XX0 CHOROID PLEXUS CYST OF FETUS ON PRENATAL ULTRASOUND: ICD-10-CM

## 2021-10-13 DIAGNOSIS — O99.012 ANEMIA DURING PREGNANCY IN SECOND TRIMESTER: ICD-10-CM

## 2021-10-13 DIAGNOSIS — Z3A.21 21 WEEKS GESTATION OF PREGNANCY: ICD-10-CM

## 2021-10-13 DIAGNOSIS — O09.292 HISTORY OF IUGR (INTRAUTERINE GROWTH RETARDATION) AND STILLBIRTH, CURRENTLY PREGNANT, SECOND TRIMESTER: ICD-10-CM

## 2021-10-13 LAB
SL AMB  POCT GLUCOSE, UA: NORMAL
SL AMB POCT URINE PROTEIN: NORMAL

## 2021-10-13 PROCEDURE — PNV: Performed by: OBSTETRICS & GYNECOLOGY

## 2021-10-19 ENCOUNTER — TELEPHONE (OUTPATIENT)
Dept: OBGYN CLINIC | Facility: CLINIC | Age: 26
End: 2021-10-19

## 2021-10-21 ENCOUNTER — TELEPHONE (OUTPATIENT)
Dept: GASTROENTEROLOGY | Facility: CLINIC | Age: 26
End: 2021-10-21

## 2021-10-21 ENCOUNTER — OFFICE VISIT (OUTPATIENT)
Dept: GASTROENTEROLOGY | Facility: CLINIC | Age: 26
End: 2021-10-21
Payer: COMMERCIAL

## 2021-10-21 VITALS
WEIGHT: 95 LBS | HEIGHT: 59 IN | HEART RATE: 73 BPM | BODY MASS INDEX: 19.15 KG/M2 | DIASTOLIC BLOOD PRESSURE: 58 MMHG | SYSTOLIC BLOOD PRESSURE: 98 MMHG

## 2021-10-21 DIAGNOSIS — K59.09 OTHER CONSTIPATION: Primary | ICD-10-CM

## 2021-10-21 PROCEDURE — 99213 OFFICE O/P EST LOW 20 MIN: CPT | Performed by: NURSE PRACTITIONER

## 2021-11-29 ENCOUNTER — ULTRASOUND (OUTPATIENT)
Dept: PERINATAL CARE | Facility: OTHER | Age: 26
End: 2021-11-29
Payer: COMMERCIAL

## 2021-11-29 VITALS
DIASTOLIC BLOOD PRESSURE: 62 MMHG | BODY MASS INDEX: 22.7 KG/M2 | SYSTOLIC BLOOD PRESSURE: 111 MMHG | WEIGHT: 105.2 LBS | HEIGHT: 57 IN | HEART RATE: 97 BPM

## 2021-11-29 DIAGNOSIS — Z03.79 SUSPECTED PROBLEM WITH FETUS NOT FOUND: ICD-10-CM

## 2021-11-29 DIAGNOSIS — O34.211 MATERNAL CARE DUE TO LOW TRANSVERSE UTERINE SCAR FROM PREVIOUS CESAREAN DELIVERY: ICD-10-CM

## 2021-11-29 DIAGNOSIS — Z3A.28 28 WEEKS GESTATION OF PREGNANCY: ICD-10-CM

## 2021-11-29 DIAGNOSIS — O09.891 SHORT INTERVAL BETWEEN PREGNANCIES AFFECTING PREGNANCY IN FIRST TRIMESTER, ANTEPARTUM: ICD-10-CM

## 2021-11-29 DIAGNOSIS — O09.292 HISTORY OF IUGR (INTRAUTERINE GROWTH RETARDATION) AND STILLBIRTH, CURRENTLY PREGNANT, SECOND TRIMESTER: Primary | ICD-10-CM

## 2021-11-29 PROCEDURE — 76816 OB US FOLLOW-UP PER FETUS: CPT | Performed by: OBSTETRICS & GYNECOLOGY

## 2021-11-29 PROCEDURE — 99213 OFFICE O/P EST LOW 20 MIN: CPT | Performed by: OBSTETRICS & GYNECOLOGY

## 2021-11-29 RX ORDER — POLYETHYLENE GLYCOL 3350 17 G/17G
17 POWDER, FOR SOLUTION ORAL DAILY
COMMUNITY

## 2021-12-08 ENCOUNTER — ROUTINE PRENATAL (OUTPATIENT)
Dept: OBGYN CLINIC | Facility: CLINIC | Age: 26
End: 2021-12-08

## 2021-12-08 VITALS
WEIGHT: 106 LBS | SYSTOLIC BLOOD PRESSURE: 90 MMHG | HEIGHT: 58 IN | DIASTOLIC BLOOD PRESSURE: 56 MMHG | BODY MASS INDEX: 22.25 KG/M2

## 2021-12-08 DIAGNOSIS — O34.211 MATERNAL CARE DUE TO LOW TRANSVERSE UTERINE SCAR FROM PREVIOUS CESAREAN DELIVERY: Primary | ICD-10-CM

## 2021-12-08 DIAGNOSIS — Z23 ENCOUNTER FOR IMMUNIZATION: ICD-10-CM

## 2021-12-08 DIAGNOSIS — Z87.59 HISTORY OF PRIOR PREGNANCY WITH IUGR NEWBORN: ICD-10-CM

## 2021-12-08 DIAGNOSIS — Z3A.29 29 WEEKS GESTATION OF PREGNANCY: ICD-10-CM

## 2021-12-08 DIAGNOSIS — O99.012 ANEMIA DURING PREGNANCY IN SECOND TRIMESTER: ICD-10-CM

## 2021-12-08 DIAGNOSIS — Z36.89 ENCOUNTER FOR OTHER SPECIFIED ANTENATAL SCREENING: ICD-10-CM

## 2021-12-08 LAB
SL AMB  POCT GLUCOSE, UA: NEGATIVE
SL AMB POCT URINE PROTEIN: NORMAL

## 2021-12-08 PROCEDURE — PNV: Performed by: OBSTETRICS & GYNECOLOGY

## 2021-12-13 LAB
EXTERNAL HEMATOCRIT: 24.6 %
EXTERNAL HEMOGLOBIN: 7.5 G/DL
EXTERNAL PLATELET COUNT: 180 K/ΜL
EXTERNAL SYPHILIS RPR SCREEN: NORMAL

## 2021-12-14 LAB
ERYTHROCYTE [DISTWIDTH] IN BLOOD BY AUTOMATED COUNT: 13.7 % (ref 11.7–15.4)
GLUCOSE 1H P 50 G GLC PO SERPL-MCNC: 106 MG/DL (ref 65–139)
HCT VFR BLD AUTO: 24.6 % (ref 34–46.6)
HGB BLD-MCNC: 7.5 G/DL (ref 11.1–15.9)
MCH RBC QN AUTO: 22 PG (ref 26.6–33)
MCHC RBC AUTO-ENTMCNC: 30.5 G/DL (ref 31.5–35.7)
MCV RBC AUTO: 72 FL (ref 79–97)
PLATELET # BLD AUTO: 180 X10E3/UL (ref 150–450)
RBC # BLD AUTO: 3.41 X10E6/UL (ref 3.77–5.28)
RPR SER QL: NON REACTIVE
WBC # BLD AUTO: 9.9 X10E3/UL (ref 3.4–10.8)

## 2021-12-16 ENCOUNTER — TELEPHONE (OUTPATIENT)
Dept: OBGYN CLINIC | Facility: CLINIC | Age: 26
End: 2021-12-16

## 2021-12-16 DIAGNOSIS — O99.013 ANEMIA DURING PREGNANCY IN THIRD TRIMESTER: Primary | ICD-10-CM

## 2021-12-16 RX ORDER — SODIUM CHLORIDE 9 MG/ML
20 INJECTION, SOLUTION INTRAVENOUS ONCE
Status: CANCELLED | OUTPATIENT
Start: 2021-12-20

## 2021-12-20 ENCOUNTER — HOSPITAL ENCOUNTER (OUTPATIENT)
Dept: INFUSION CENTER | Facility: HOSPITAL | Age: 26
End: 2021-12-20
Attending: OBSTETRICS & GYNECOLOGY

## 2021-12-21 ENCOUNTER — HOSPITAL ENCOUNTER (OUTPATIENT)
Dept: INFUSION CENTER | Facility: HOSPITAL | Age: 26
End: 2021-12-21
Attending: OBSTETRICS & GYNECOLOGY

## 2021-12-22 ENCOUNTER — ROUTINE PRENATAL (OUTPATIENT)
Dept: OBGYN CLINIC | Facility: CLINIC | Age: 26
End: 2021-12-22

## 2021-12-22 VITALS
BODY MASS INDEX: 23 KG/M2 | HEIGHT: 57 IN | DIASTOLIC BLOOD PRESSURE: 72 MMHG | WEIGHT: 106.6 LBS | SYSTOLIC BLOOD PRESSURE: 117 MMHG

## 2021-12-22 DIAGNOSIS — O34.211 MATERNAL CARE DUE TO LOW TRANSVERSE UTERINE SCAR FROM PREVIOUS CESAREAN DELIVERY: Primary | ICD-10-CM

## 2021-12-22 DIAGNOSIS — O09.891 SHORT INTERVAL BETWEEN PREGNANCIES AFFECTING PREGNANCY IN FIRST TRIMESTER, ANTEPARTUM: ICD-10-CM

## 2021-12-22 DIAGNOSIS — Z87.59 HISTORY OF PRIOR PREGNANCY WITH IUGR NEWBORN: ICD-10-CM

## 2021-12-22 DIAGNOSIS — O99.013 ANEMIA DURING PREGNANCY IN THIRD TRIMESTER: ICD-10-CM

## 2021-12-22 DIAGNOSIS — O35.0XX0 CHOROID PLEXUS CYST OF FETUS ON PRENATAL ULTRASOUND: ICD-10-CM

## 2021-12-22 PROCEDURE — PNV: Performed by: STUDENT IN AN ORGANIZED HEALTH CARE EDUCATION/TRAINING PROGRAM

## 2021-12-23 ENCOUNTER — HOSPITAL ENCOUNTER (OUTPATIENT)
Dept: INFUSION CENTER | Facility: HOSPITAL | Age: 26
Discharge: HOME | End: 2021-12-23
Attending: OBSTETRICS & GYNECOLOGY
Payer: COMMERCIAL

## 2021-12-23 VITALS
OXYGEN SATURATION: 99 % | DIASTOLIC BLOOD PRESSURE: 55 MMHG | TEMPERATURE: 97.7 F | SYSTOLIC BLOOD PRESSURE: 101 MMHG | HEART RATE: 77 BPM | RESPIRATION RATE: 14 BRPM

## 2021-12-23 DIAGNOSIS — O99.013 ANEMIA DURING PREGNANCY IN THIRD TRIMESTER: Primary | ICD-10-CM

## 2021-12-23 PROCEDURE — 96365 THER/PROPH/DIAG IV INF INIT: CPT

## 2021-12-23 RX ORDER — SODIUM CHLORIDE 9 MG/ML
20 INJECTION, SOLUTION INTRAVENOUS ONCE
Status: COMPLETED | OUTPATIENT
Start: 2021-12-23 | End: 2021-12-23

## 2021-12-23 RX ORDER — SODIUM CHLORIDE 9 MG/ML
20 INJECTION, SOLUTION INTRAVENOUS ONCE
Status: CANCELLED | OUTPATIENT
Start: 2021-12-25

## 2021-12-23 RX ADMIN — SODIUM CHLORIDE 20 ML/HR: 9 INJECTION, SOLUTION INTRAVENOUS at 14:40

## 2021-12-23 RX ADMIN — IRON SUCROSE 200 MG: 20 INJECTION, SOLUTION INTRAVENOUS at 14:40

## 2021-12-27 ENCOUNTER — HOSPITAL ENCOUNTER (OUTPATIENT)
Dept: INFUSION CENTER | Facility: HOSPITAL | Age: 26
Discharge: HOME | End: 2021-12-27
Attending: OBSTETRICS & GYNECOLOGY
Payer: COMMERCIAL

## 2021-12-27 VITALS
HEART RATE: 90 BPM | RESPIRATION RATE: 14 BRPM | SYSTOLIC BLOOD PRESSURE: 113 MMHG | TEMPERATURE: 98.2 F | OXYGEN SATURATION: 99 % | DIASTOLIC BLOOD PRESSURE: 69 MMHG

## 2021-12-27 DIAGNOSIS — O99.013 ANEMIA DURING PREGNANCY IN THIRD TRIMESTER: Primary | ICD-10-CM

## 2021-12-27 PROCEDURE — 96365 THER/PROPH/DIAG IV INF INIT: CPT

## 2021-12-27 RX ORDER — SODIUM CHLORIDE 9 MG/ML
20 INJECTION, SOLUTION INTRAVENOUS ONCE
Status: CANCELLED | OUTPATIENT
Start: 2021-12-30

## 2021-12-27 RX ORDER — SODIUM CHLORIDE 9 MG/ML
20 INJECTION, SOLUTION INTRAVENOUS ONCE
Status: COMPLETED | OUTPATIENT
Start: 2021-12-27 | End: 2021-12-27

## 2021-12-27 RX ADMIN — IRON SUCROSE 200 MG: 20 INJECTION, SOLUTION INTRAVENOUS at 15:04

## 2021-12-27 RX ADMIN — SODIUM CHLORIDE 20 ML/HR: 9 INJECTION, SOLUTION INTRAVENOUS at 15:04

## 2021-12-30 ENCOUNTER — HOSPITAL ENCOUNTER (OUTPATIENT)
Dept: INFUSION CENTER | Facility: HOSPITAL | Age: 26
Discharge: HOME | End: 2021-12-30
Attending: OBSTETRICS & GYNECOLOGY
Payer: COMMERCIAL

## 2021-12-30 VITALS
RESPIRATION RATE: 16 BRPM | HEART RATE: 70 BPM | OXYGEN SATURATION: 98 % | DIASTOLIC BLOOD PRESSURE: 47 MMHG | SYSTOLIC BLOOD PRESSURE: 92 MMHG | TEMPERATURE: 98.5 F

## 2021-12-30 DIAGNOSIS — O99.013 ANEMIA DURING PREGNANCY IN THIRD TRIMESTER: Primary | ICD-10-CM

## 2021-12-30 PROCEDURE — 96365 THER/PROPH/DIAG IV INF INIT: CPT

## 2021-12-30 RX ORDER — SODIUM CHLORIDE 9 MG/ML
20 INJECTION, SOLUTION INTRAVENOUS ONCE
Status: CANCELLED | OUTPATIENT
Start: 2022-01-03

## 2021-12-30 RX ORDER — SODIUM CHLORIDE 9 MG/ML
20 INJECTION, SOLUTION INTRAVENOUS ONCE
Status: COMPLETED | OUTPATIENT
Start: 2021-12-30 | End: 2021-12-30

## 2021-12-30 RX ADMIN — IRON SUCROSE 200 MG: 20 INJECTION, SOLUTION INTRAVENOUS at 14:53

## 2021-12-30 RX ADMIN — SODIUM CHLORIDE 20 ML/HR: 9 INJECTION, SOLUTION INTRAVENOUS at 14:53

## 2021-12-30 NOTE — PROGRESS NOTES
Patient completed venofer infusion with no adverse reactions  AVS provided, patient left unit ambulatory with steady gait

## 2022-01-03 ENCOUNTER — HOSPITAL ENCOUNTER (OUTPATIENT)
Dept: INFUSION CENTER | Facility: HOSPITAL | Age: 27
Discharge: HOME | End: 2022-01-03
Attending: OBSTETRICS & GYNECOLOGY
Payer: COMMERCIAL

## 2022-01-03 VITALS
SYSTOLIC BLOOD PRESSURE: 101 MMHG | OXYGEN SATURATION: 98 % | TEMPERATURE: 98.1 F | HEART RATE: 70 BPM | RESPIRATION RATE: 16 BRPM | DIASTOLIC BLOOD PRESSURE: 49 MMHG

## 2022-01-03 DIAGNOSIS — O99.013 ANEMIA DURING PREGNANCY IN THIRD TRIMESTER: Primary | ICD-10-CM

## 2022-01-03 PROCEDURE — 96365 THER/PROPH/DIAG IV INF INIT: CPT

## 2022-01-03 RX ORDER — SODIUM CHLORIDE 9 MG/ML
20 INJECTION, SOLUTION INTRAVENOUS ONCE
Status: COMPLETED | OUTPATIENT
Start: 2022-01-03 | End: 2022-01-03

## 2022-01-03 RX ORDER — SODIUM CHLORIDE 9 MG/ML
20 INJECTION, SOLUTION INTRAVENOUS ONCE
Status: CANCELLED | OUTPATIENT
Start: 2022-01-06

## 2022-01-03 RX ADMIN — SODIUM CHLORIDE 20 ML/HR: 9 INJECTION, SOLUTION INTRAVENOUS at 15:06

## 2022-01-03 RX ADMIN — IRON SUCROSE 200 MG: 20 INJECTION, SOLUTION INTRAVENOUS at 15:07

## 2022-01-03 NOTE — PLAN OF CARE
Problem: Knowledge Deficit  Goal: Patient/family/caregiver demonstrates understanding of disease process, treatment plan, medications, and discharge instructions  Description: Complete learning assessment and assess knowledge base    Interventions:  - Provide teaching at level of understanding  - Provide teaching via preferred learning methods  1/3/2022 1605 by Gerhardt Pounds, RN  Outcome: Progressing  1/3/2022 1552 by Gerhardt Pounds, RN  Outcome: Progressing

## 2022-01-05 ENCOUNTER — ROUTINE PRENATAL (OUTPATIENT)
Dept: OBGYN CLINIC | Facility: CLINIC | Age: 27
End: 2022-01-05
Payer: COMMERCIAL

## 2022-01-05 VITALS
WEIGHT: 109 LBS | DIASTOLIC BLOOD PRESSURE: 72 MMHG | SYSTOLIC BLOOD PRESSURE: 112 MMHG | BODY MASS INDEX: 23.51 KG/M2 | HEIGHT: 57 IN

## 2022-01-05 DIAGNOSIS — O34.211 MATERNAL CARE DUE TO LOW TRANSVERSE UTERINE SCAR FROM PREVIOUS CESAREAN DELIVERY: Primary | ICD-10-CM

## 2022-01-05 DIAGNOSIS — Z23 FLU VACCINE NEED: ICD-10-CM

## 2022-01-05 DIAGNOSIS — O99.013 ANEMIA DURING PREGNANCY IN THIRD TRIMESTER: ICD-10-CM

## 2022-01-05 DIAGNOSIS — Z3A.33 33 WEEKS GESTATION OF PREGNANCY: ICD-10-CM

## 2022-01-05 DIAGNOSIS — Z87.59 HISTORY OF PRIOR PREGNANCY WITH IUGR NEWBORN: ICD-10-CM

## 2022-01-05 LAB
SL AMB  POCT GLUCOSE, UA: NORMAL
SL AMB POCT URINE PROTEIN: NORMAL

## 2022-01-05 PROCEDURE — 90471 IMMUNIZATION ADMIN: CPT

## 2022-01-05 PROCEDURE — PNV: Performed by: OBSTETRICS & GYNECOLOGY

## 2022-01-05 PROCEDURE — 90686 IIV4 VACC NO PRSV 0.5 ML IM: CPT

## 2022-01-06 ENCOUNTER — HOSPITAL ENCOUNTER (OUTPATIENT)
Dept: INFUSION CENTER | Facility: HOSPITAL | Age: 27
Discharge: HOME | End: 2022-01-06
Attending: OBSTETRICS & GYNECOLOGY
Payer: COMMERCIAL

## 2022-01-06 VITALS
RESPIRATION RATE: 16 BRPM | SYSTOLIC BLOOD PRESSURE: 100 MMHG | HEART RATE: 70 BPM | TEMPERATURE: 97.9 F | OXYGEN SATURATION: 100 % | DIASTOLIC BLOOD PRESSURE: 56 MMHG

## 2022-01-06 DIAGNOSIS — O99.013 ANEMIA DURING PREGNANCY IN THIRD TRIMESTER: Primary | ICD-10-CM

## 2022-01-06 PROCEDURE — 96365 THER/PROPH/DIAG IV INF INIT: CPT

## 2022-01-06 RX ORDER — SODIUM CHLORIDE 9 MG/ML
20 INJECTION, SOLUTION INTRAVENOUS ONCE
Status: COMPLETED | OUTPATIENT
Start: 2022-01-06 | End: 2022-01-06

## 2022-01-06 RX ORDER — SODIUM CHLORIDE 9 MG/ML
20 INJECTION, SOLUTION INTRAVENOUS ONCE
Status: CANCELLED | OUTPATIENT
Start: 2022-01-06

## 2022-01-06 RX ADMIN — SODIUM CHLORIDE 20 ML/HR: 9 INJECTION, SOLUTION INTRAVENOUS at 14:57

## 2022-01-06 RX ADMIN — IRON SUCROSE 200 MG: 20 INJECTION, SOLUTION INTRAVENOUS at 14:57

## 2022-01-06 NOTE — PROGRESS NOTES
Patient completed venofer infusion with no adverse reactions  Declined AVS, patient left unit ambulatory with steady gait

## 2022-01-10 ENCOUNTER — ULTRASOUND (OUTPATIENT)
Dept: PERINATAL CARE | Facility: OTHER | Age: 27
End: 2022-01-10
Payer: COMMERCIAL

## 2022-01-10 VITALS
HEART RATE: 95 BPM | HEIGHT: 57 IN | DIASTOLIC BLOOD PRESSURE: 77 MMHG | WEIGHT: 109.8 LBS | BODY MASS INDEX: 23.69 KG/M2 | SYSTOLIC BLOOD PRESSURE: 120 MMHG

## 2022-01-10 DIAGNOSIS — O09.293 HISTORY OF INTRAUTERINE GROWTH RESTRICTION IN PRIOR PREGNANCY, CURRENTLY PREGNANT, THIRD TRIMESTER: ICD-10-CM

## 2022-01-10 DIAGNOSIS — Z3A.34 34 WEEKS GESTATION OF PREGNANCY: ICD-10-CM

## 2022-01-10 DIAGNOSIS — O09.893 SHORT INTERVAL BETWEEN PREGNANCIES COMPLICATING PREGNANCY, ANTEPARTUM, THIRD TRIMESTER: Primary | ICD-10-CM

## 2022-01-10 DIAGNOSIS — Z98.891 HISTORY OF CESAREAN SECTION: ICD-10-CM

## 2022-01-10 PROCEDURE — 99213 OFFICE O/P EST LOW 20 MIN: CPT | Performed by: OBSTETRICS & GYNECOLOGY

## 2022-01-10 PROCEDURE — 76816 OB US FOLLOW-UP PER FETUS: CPT | Performed by: OBSTETRICS & GYNECOLOGY

## 2022-01-10 NOTE — PATIENT INSTRUCTIONS
Kick Counts in Pregnancy   WHAT YOU NEED TO KNOW:   Kick counts measure how much your baby is moving in your womb  A kick from your baby can be felt as a twist, turn, swish, roll, or jab  It is common to feel your baby kicking at 26 to 28 weeks of pregnancy  You may feel your baby kick as early as 20 weeks of pregnancy  You may want to start counting at 28 weeks  DISCHARGE INSTRUCTIONS:   Contact your doctor immediately if:   · You feel a change in the number of kicks or movements of your baby  · You feel fewer than 10 kicks within 2 hours  · You have questions or concerns about your baby's movements  Why measure kick counts:  Your baby's movement may provide information about your baby's health  He or she may move less, or not at all, if there are problems  Your baby may move less if he or she is not getting enough oxygen or nutrition from the placenta  Do not smoke while you are pregnant  Smoking decreases the amount of oxygen that gets to your baby  Talk to your healthcare provider if you need help to quit smoking  Tell your healthcare provider as soon as you feel a change in your baby's movements  When to measure kick counts:   · Measure kick counts at the same time every day  · Measure kick counts when your baby is awake and most active  Your baby may be most active in the evening  How to measure kick counts:  Check that your baby is awake before you measure kick counts  You can wake up your baby by lightly pushing on your belly, walking, or drinking something cold  Your healthcare provider may tell you different ways to measure kick counts  You may be told to do the following:  · Use a chart or clock to keep track of the time you start and finish counting  · Sit in a chair or lie on your left side  · Place your hands on the largest part of your belly  · Count until you reach 10 kicks  Write down how much time it takes to count 10 kicks       · It may take 30 minutes to 2 hours to count 10 kicks  It should not take more than 2 hours to count 10 kicks  Follow up with your doctor as directed:  Write down your questions so you remember to ask them during your visits  © Copyright [employer] 2021 Information is for End User's use only and may not be sold, redistributed or otherwise used for commercial purposes  All illustrations and images included in CareNotes® are the copyrighted property of A D A M , Inc  or [facility] [employer]   The above information is an  only  It is not intended as medical advice for individual conditions or treatments  Talk to your doctor, nurse or pharmacist before following any medical regimen to see if it is safe and effective for you

## 2022-01-10 NOTE — LETTER
January 10, 2022     Jose Elias Rojo MD  [facility] [address]  [address] [facility] [address]  [facility]    Patient: Romayne Alderman   YOB: 1995   Date of Visit: 1/10/2022       Dear Dr Bren Yu:    Thank you for referring Mone Garces to me for evaluation  Below are my notes for this consultation  If you have questions, please do not hesitate to call me  I look forward to following your patient along with you  Sincerely,        Emil Banegas MD        CC: No Recipients  Emil Banegas MD  1/9/2022  7:08 PM  Sign when Signing Visit  Please refer to the [facility] ultrasound report in Ob Procedures for additional information regarding today's visit

## 2022-01-18 ENCOUNTER — ROUTINE PRENATAL (OUTPATIENT)
Dept: OBGYN CLINIC | Facility: CLINIC | Age: 27
End: 2022-01-18

## 2022-01-18 VITALS — SYSTOLIC BLOOD PRESSURE: 90 MMHG | DIASTOLIC BLOOD PRESSURE: 50 MMHG | WEIGHT: 112 LBS | BODY MASS INDEX: 24.24 KG/M2

## 2022-01-18 DIAGNOSIS — D50.9 IRON DEFICIENCY ANEMIA, UNSPECIFIED IRON DEFICIENCY ANEMIA TYPE: ICD-10-CM

## 2022-01-18 DIAGNOSIS — Z3A.35 35 WEEKS GESTATION OF PREGNANCY: Primary | ICD-10-CM

## 2022-01-18 LAB
SL AMB  POCT GLUCOSE, UA: NEGATIVE
SL AMB POCT URINE PROTEIN: NEGATIVE

## 2022-01-18 PROCEDURE — PNV: Performed by: OBSTETRICS & GYNECOLOGY

## 2022-01-18 NOTE — PROGRESS NOTES
Routine Prenatal Visit  [address], [address], [facility], [address] [facility]    Assessment/Plan:  Berry Simeon is a 31y o  year old  at 35w2d who presents for routine prenatal visit  1  35 weeks gestation of pregnancy  -     POCT urine dip  -     CBC (Includes Diff/Plt) (Refl); Future    2  Iron deficiency anemia, unspecified iron deficiency anemia type  Comments:  hx of  iron  infusions   C/o of  fatigue  and   dizziness   Orders:  -     CBC (Includes Diff/Plt) (Refl); Future          Subjective:     CC: Prenatal care    Kitty Umaña is a 31 y o   female who presents for routine prenatal care at 35w2d  Pregnancy ROS: no  leakage of fluid, pelvic pain, or vaginal bleeding   + fetal movement  The following portions of the patient's history were reviewed and updated as appropriate: allergies, current medications, past family history, past medical history, obstetric history, gynecologic history, past social history, past surgical history and problem list       Objective:  BP 90/50   Wt 50 8 kg (112 lb)   LMP 2021 (Within Weeks)   BMI 24 24 kg/m²   Pregravid Weight/BMI: 39 5 kg (87 lb) (BMI 18 82)  Current Weight: 50 8 kg (112 lb)   Total Weight Gain: 11 3 kg (25 lb)   Pre- Vitals      Most Recent Value   Prenatal Assessment    Fetal Heart Rate 138-143   Fundal Height (cm) 35 cm   Movement Present   Presentation Vertex   Prenatal Vitals    Blood Pressure 90/50   Weight - Scale 50 8 kg (112 lb)   Urine Albumin/Glucose    Dilation/Effacement/Station    Vaginal Drainage    Draining Fluid No   Edema    LLE Edema Trace   RLE Edema Trace   Facial Edema None           General: Well appearing, no distress  Respiratory: Unlabored breathing  Cardiovascular: Regular rate  Abdomen: Soft, gravid, nontender  Fundal Height: Appropriate for gestational age  Extremities: Warm and well perfused  Non tender

## 2022-01-18 NOTE — PROGRESS NOTES
GBS, delivery consent, and wash instructions next visit  C/S scheduled with Dr Das Thorpe 2/14/22  Finished her iron infusions  A positive  Covid vaccine-no  Flu and Tdap vaccinated  C/o pressure  Denies LOF and VB  BH contractions and +FM

## 2022-01-20 ENCOUNTER — TELEPHONE (OUTPATIENT)
Dept: OBGYN CLINIC | Facility: CLINIC | Age: 27
End: 2022-01-20

## 2022-01-20 NOTE — TELEPHONE ENCOUNTER
Pt is currently 35w4d pregnant () and scheduled for a repeat  on 22  Pt reports the onset of body aches, sinus pressure and congestion, cough,  fever 100 8, and lightheadedness that started yesterday,Home Covid test positive  Pt is not currently COVID vaccinated  OB HX:    1  Maternal care due to low transverse uterine scar from previous  delivery  2  History of prior pregnancy with IUGR    3  Anemia during pregnancy in third trimester  4  Short interval between pregnancies affecting pregnancy in first trimester, antepartum  5  Choroid plexus cyst of fetus on prenatal ultrasound  PMH: Syncope, IBS, stomach ulcer, migraine headaches, Hemorrhoids, L Ovarain cyst    Reviewed with on call provider Dr Bridget Aguayo who will contact COVID infusion team for further direction

## 2022-01-20 NOTE — TELEPHONE ENCOUNTER
Spoke with patient  Sotrovimab indications, R&B reviewed with patient  Patient declined treatment with Sotrovimab  Pt aware it can be administered 7 days from the day of onset of symptoms, in case she decides to proceed

## 2022-02-02 ENCOUNTER — ROUTINE PRENATAL (OUTPATIENT)
Dept: OBGYN CLINIC | Facility: CLINIC | Age: 27
End: 2022-02-02

## 2022-02-02 VITALS
DIASTOLIC BLOOD PRESSURE: 60 MMHG | WEIGHT: 113 LBS | SYSTOLIC BLOOD PRESSURE: 100 MMHG | HEIGHT: 57 IN | BODY MASS INDEX: 24.38 KG/M2

## 2022-02-02 DIAGNOSIS — O34.211 MATERNAL CARE DUE TO LOW TRANSVERSE UTERINE SCAR FROM PREVIOUS CESAREAN DELIVERY: Primary | ICD-10-CM

## 2022-02-02 DIAGNOSIS — O99.013 ANEMIA DURING PREGNANCY IN THIRD TRIMESTER: ICD-10-CM

## 2022-02-02 DIAGNOSIS — Z3A.37 37 WEEKS GESTATION OF PREGNANCY: ICD-10-CM

## 2022-02-02 DIAGNOSIS — Z87.59 HISTORY OF PRIOR PREGNANCY WITH IUGR NEWBORN: ICD-10-CM

## 2022-02-02 DIAGNOSIS — Z36.85 ENCOUNTER FOR ANTENATAL SCREENING FOR STREPTOCOCCUS B: ICD-10-CM

## 2022-02-02 PROBLEM — O09.891 SHORT INTERVAL BETWEEN PREGNANCIES AFFECTING PREGNANCY IN FIRST TRIMESTER, ANTEPARTUM: Status: RESOLVED | Noted: 2021-08-20 | Resolved: 2022-02-02

## 2022-02-02 LAB
SL AMB  POCT GLUCOSE, UA: NORMAL
SL AMB POCT URINE PROTEIN: NORMAL

## 2022-02-02 PROCEDURE — PNV: Performed by: STUDENT IN AN ORGANIZED HEALTH CARE EDUCATION/TRAINING PROGRAM

## 2022-02-02 NOTE — PROGRESS NOTES
Routine Prenatal Visit  [address], [address], [facility], [address] [facility]    Assessment/Plan:  Anthony Cintron is a 31y o  year old  at 33w3d who presents for routine prenatal visit  1  Maternal care due to low transverse uterine scar from previous  delivery    2  History of prior pregnancy with IUGR     3  Anemia during pregnancy in third trimester    4  33 weeks gestation of pregnancy  -     POCT urine dip    5  Flu vaccine need  -     FLUZONE: influenza vaccine, quadrivalent, 0 5 mL          Subjective:     CC: Prenatal care    Malick Rocha is a 31 y o   female who presents for routine prenatal care at 33w3d  Pregnancy ROS: no leakage of fluid, pelvic pain, or vaginal bleeding  normal fetal movement  The following portions of the patient's history were reviewed and updated as appropriate: allergies, current medications, past family history, past medical history, obstetric history, gynecologic history, past social history, past surgical history and problem list       Objective:  /72 (BP Location: Left arm, Patient Position: Sitting, Cuff Size: Standard)   Ht 4' 9" (1 448 m)   Wt 49 4 kg (109 lb)   LMP 2021 (Within Weeks)   BMI 23 59 kg/m²   Pregravid Weight/BMI: 39 5 kg (87 lb) (BMI 18 82)  Current Weight: 49 4 kg (109 lb)   Total Weight Gain: 9 979 kg (22 lb)   Pre- Vitals      Most Recent Value   Prenatal Assessment    Fetal Heart Rate 140   Fundal Height (cm) 33 cm   Movement Present   Presentation Vertex   Prenatal Vitals    Blood Pressure 112/72   Weight - Scale 49 4 kg (109 lb)   Urine Albumin/Glucose    Dilation/Effacement/Station    Vaginal Drainage    Edema            General: Well appearing, no distress  Respiratory: Unlabored breathing  Cardiovascular: Regular rate  Abdomen: Soft, gravid, nontender  Fundal Height: Appropriate for gestational age  Extremities: Warm and well perfused  Non tender

## 2022-02-02 NOTE — PROGRESS NOTES
Routine Prenatal Visit  [address], [address], [facility], [address] [facility]    Assessment/Plan:  Jenni Miller is a 31y o  year old  at 37w3d who presents for routine prenatal visit  1  Maternal care due to low transverse uterine scar from previous  delivery  Assessment & Plan:  - Labor/Bleeding/ROM precautions given  Kick counts reviewed  - GBS swab collected today  - Scheduled for repeat  on 2022  Discussed TOLAC versus repeat   Given short interval pregnancy (with interpregnancy interval < 9 months) and short maternal stature, patient has elected repeat  delivery  - Delivery consent reviewed and signed today  Risks of delivery reviewed, including bleeding, infection, damage to surrounding organs/structures (specifically bowel, bladder, vessels, nerves, ureters), scar tissue formation, hernia, hysterectomy, need for blood transfusion, need for further surgery  - Flu vaccine previously given  TDaP previously given  - Problem list updated, results console reviewed and updated with pertinent prenatal labs  - PMH, PSH, medications reviewed and updated as needed  - Return to office in 1 wk(s) for routine prenatal care        2  Encounter for  screening for Streptococcus B  -     Strep B DNA probe, amplification    3  37 weeks gestation of pregnancy  -     POCT urine dip    4  History of prior pregnancy with IUGR     5  Anemia during pregnancy in third trimester  Assessment & Plan:  - s/p IV iron infusions x 5  Repeat CBC ordered; patient will have drawn this week  Subjective:   Emily Lilly is a 31 y o   who presents for routine prenatal care at 37w3d    Complaints today: No  LOF: No; VB: No; Contractions: No; FM: Present    Objective:  /60 (BP Location: Left arm, Patient Position: Sitting, Cuff Size: Standard)   Ht 4' 9" (1 448 m)   Wt 51 3 kg (113 lb)   LMP 2021 (Within Weeks)   BMI 24 45 kg/m²     General: Well appearing, no distress  Respiratory: Unlabored breathing  Cardiovascular: Regular rate  Abdomen: Soft, gravid, nontender  Extremities: Warm and well perfused  Non tender      Pregravid Weight/BMI: 39 5 kg (87 lb) (BMI 18 82)  Current Weight: 51 3 kg (113 lb)   Total Weight Gain: 11 8 kg (26 lb)     Pre-Susan Vitals      Most Recent Value   Prenatal Assessment    Fetal Heart Rate 140   Fundal Height (cm) 36 cm   Movement Present   Presentation Vertex   Prenatal Vitals    Blood Pressure 100/60   Weight - Scale 51 3 kg (113 lb)   Urine Albumin/Glucose    Dilation/Effacement/Station    Vaginal Drainage    Draining Fluid No   Edema    LLE Edema None   RLE Edema None   Facial Edema None           Charmain Lundborg, MD  2022 1:13 PM

## 2022-02-02 NOTE — ASSESSMENT & PLAN NOTE
- Labor/Bleeding/ROM precautions given  Kick counts reviewed  - GBS swab collected today  - Scheduled for repeat  on 2022  Discussed TOLAC versus repeat   Given short interval pregnancy (with interpregnancy interval < 9 months) and short maternal stature, patient has elected repeat  delivery  - Delivery consent reviewed and signed today  Risks of delivery reviewed, including bleeding, infection, damage to surrounding organs/structures (specifically bowel, bladder, vessels, nerves, ureters), scar tissue formation, hernia, hysterectomy, need for blood transfusion, need for further surgery  - Flu vaccine previously given  TDaP previously given  - Problem list updated, results console reviewed and updated with pertinent prenatal labs    - PMH, PSH, medications reviewed and updated as needed  - Return to office in 1 wk(s) for routine prenatal care

## 2022-02-04 LAB — GP B STREP DNA SPEC QL NAA+PROBE: NEGATIVE

## 2022-02-05 ENCOUNTER — TELEPHONE (OUTPATIENT)
Dept: LABOR AND DELIVERY | Facility: HOSPITAL | Age: 27
End: 2022-02-05

## 2022-02-05 ENCOUNTER — HOSPITAL ENCOUNTER (INPATIENT)
Facility: HOSPITAL | Age: 27
LOS: 3 days | Discharge: HOME | End: 2022-02-08
Attending: OBSTETRICS & GYNECOLOGY | Admitting: OBSTETRICS & GYNECOLOGY
Payer: COMMERCIAL

## 2022-02-05 ENCOUNTER — ANESTHESIA (INPATIENT)
Dept: LABOR AND DELIVERY | Facility: HOSPITAL | Age: 27
End: 2022-02-05
Payer: COMMERCIAL

## 2022-02-05 ENCOUNTER — ANESTHESIA EVENT (INPATIENT)
Dept: LABOR AND DELIVERY | Facility: HOSPITAL | Age: 27
End: 2022-02-05
Payer: COMMERCIAL

## 2022-02-05 DIAGNOSIS — O34.211 MATERNAL CARE DUE TO LOW TRANSVERSE UTERINE SCAR FROM PREVIOUS CESAREAN DELIVERY: ICD-10-CM

## 2022-02-05 DIAGNOSIS — Z98.891 HISTORY OF CESAREAN SECTION: Primary | ICD-10-CM

## 2022-02-05 LAB
ERYTHROCYTE [DISTWIDTH] IN BLOOD BY AUTOMATED COUNT: 21.6 % (ref 11.6–15.1)
HCT VFR BLD AUTO: 36.7 % (ref 34.8–46.1)
HGB BLD-MCNC: 11.5 G/DL (ref 11.5–15.4)
MCH RBC QN AUTO: 26.1 PG (ref 26.8–34.3)
MCHC RBC AUTO-ENTMCNC: 31.3 G/DL (ref 31.4–37.4)
MCV RBC AUTO: 83 FL (ref 82–98)
PLATELET # BLD AUTO: 180 THOUSANDS/UL (ref 149–390)
PMV BLD AUTO: 12.3 FL (ref 8.9–12.7)
RBC # BLD AUTO: 4.41 MILLION/UL (ref 3.81–5.12)
WBC # BLD AUTO: 10 THOUSAND/UL (ref 4.31–10.16)

## 2022-02-05 PROCEDURE — 86901 BLOOD TYPING SEROLOGIC RH(D): CPT | Performed by: OBSTETRICS & GYNECOLOGY

## 2022-02-05 PROCEDURE — 86850 RBC ANTIBODY SCREEN: CPT | Performed by: OBSTETRICS & GYNECOLOGY

## 2022-02-05 PROCEDURE — 86900 BLOOD TYPING SEROLOGIC ABO: CPT | Performed by: OBSTETRICS & GYNECOLOGY

## 2022-02-05 PROCEDURE — 85027 COMPLETE CBC AUTOMATED: CPT | Performed by: OBSTETRICS & GYNECOLOGY

## 2022-02-05 PROCEDURE — NC001 PR NO CHARGE: Performed by: OBSTETRICS & GYNECOLOGY

## 2022-02-05 PROCEDURE — 86592 SYPHILIS TEST NON-TREP QUAL: CPT | Performed by: OBSTETRICS & GYNECOLOGY

## 2022-02-05 RX ORDER — CEFAZOLIN SODIUM 2 G/50ML
2000 SOLUTION INTRAVENOUS ONCE
Status: DISCONTINUED | OUTPATIENT
Start: 2022-02-05 | End: 2022-02-08 | Stop reason: HOSPADM

## 2022-02-05 RX ORDER — ONDANSETRON 2 MG/ML
4 INJECTION INTRAMUSCULAR; INTRAVENOUS EVERY 8 HOURS PRN
Status: DISCONTINUED | OUTPATIENT
Start: 2022-02-05 | End: 2022-02-08 | Stop reason: HOSPADM

## 2022-02-05 RX ORDER — SODIUM CHLORIDE, SODIUM LACTATE, POTASSIUM CHLORIDE, CALCIUM CHLORIDE 600; 310; 30; 20 MG/100ML; MG/100ML; MG/100ML; MG/100ML
125 INJECTION, SOLUTION INTRAVENOUS CONTINUOUS
Status: DISCONTINUED | OUTPATIENT
Start: 2022-02-05 | End: 2022-02-08 | Stop reason: HOSPADM

## 2022-02-05 RX ORDER — TRISODIUM CITRATE DIHYDRATE AND CITRIC ACID MONOHYDRATE 500; 334 MG/5ML; MG/5ML
30 SOLUTION ORAL ONCE
Status: COMPLETED | OUTPATIENT
Start: 2022-02-05 | End: 2022-02-05

## 2022-02-05 RX ADMIN — SODIUM CHLORIDE, SODIUM LACTATE, POTASSIUM CHLORIDE, AND CALCIUM CHLORIDE 1000 ML: .6; .31; .03; .02 INJECTION, SOLUTION INTRAVENOUS at 23:40

## 2022-02-05 RX ADMIN — SODIUM CITRATE AND CITRIC ACID MONOHYDRATE 30 ML: 500; 334 SOLUTION ORAL at 23:38

## 2022-02-06 LAB
ABO GROUP BLD: NORMAL
ALBUMIN SERPL BCP-MCNC: 2.4 G/DL (ref 3.5–5)
ALP SERPL-CCNC: 165 U/L (ref 46–116)
ALT SERPL W P-5'-P-CCNC: 19 U/L (ref 12–78)
ANION GAP SERPL CALCULATED.3IONS-SCNC: 5 MMOL/L (ref 4–13)
AST SERPL W P-5'-P-CCNC: 26 U/L (ref 5–45)
BASE EXCESS BLDCOA CALC-SCNC: -3.1 MMOL/L (ref 3–11)
BASE EXCESS BLDCOV CALC-SCNC: -3.9 MMOL/L (ref 1–9)
BILIRUB SERPL-MCNC: 0.3 MG/DL (ref 0.2–1)
BLD GP AB SCN SERPL QL: NEGATIVE
BUN SERPL-MCNC: 4 MG/DL (ref 5–25)
CALCIUM ALBUM COR SERPL-MCNC: 9.8 MG/DL (ref 8.3–10.1)
CALCIUM SERPL-MCNC: 8.5 MG/DL (ref 8.3–10.1)
CHLORIDE SERPL-SCNC: 104 MMOL/L (ref 100–108)
CO2 SERPL-SCNC: 24 MMOL/L (ref 21–32)
CREAT SERPL-MCNC: 0.64 MG/DL (ref 0.6–1.3)
CREAT UR-MCNC: <13 MG/DL
ERYTHROCYTE [DISTWIDTH] IN BLOOD BY AUTOMATED COUNT: 21.6 % (ref 11.6–15.1)
GFR SERPL CREATININE-BSD FRML MDRD: 122 ML/MIN/1.73SQ M
GLUCOSE SERPL-MCNC: 89 MG/DL (ref 65–140)
HCO3 BLDCOA-SCNC: 23.5 MMOL/L (ref 17.3–27.3)
HCO3 BLDCOV-SCNC: 20.4 MMOL/L (ref 12.2–28.6)
HCT VFR BLD AUTO: 39 % (ref 34.8–46.1)
HGB BLD-MCNC: 12.6 G/DL (ref 11.5–15.4)
MCH RBC QN AUTO: 27 PG (ref 26.8–34.3)
MCHC RBC AUTO-ENTMCNC: 32.3 G/DL (ref 31.4–37.4)
MCV RBC AUTO: 84 FL (ref 82–98)
O2 CT VFR BLDCOA CALC: 7.5 ML/DL
OXYHGB MFR BLDCOA: 36.4 %
OXYHGB MFR BLDCOV: 79.2 %
PCO2 BLDCOA: 47.9 MM[HG] (ref 30–60)
PCO2 BLDCOV: 35.4 MM HG (ref 27–43)
PH BLDCOA: 7.31 [PH] (ref 7.23–7.43)
PH BLDCOV: 7.38 [PH] (ref 7.19–7.49)
PLATELET # BLD AUTO: 185 THOUSANDS/UL (ref 149–390)
PMV BLD AUTO: 12.2 FL (ref 8.9–12.7)
PO2 BLDCOA: 18.3 MM HG (ref 5–25)
PO2 BLDCOV: 35 MM HG (ref 15–45)
POTASSIUM SERPL-SCNC: 4 MMOL/L (ref 3.5–5.3)
PROT SERPL-MCNC: 6 G/DL (ref 6.4–8.2)
PROT UR-MCNC: 7 MG/DL
PROT/CREAT UR: >0.54 MG/G{CREAT} (ref 0–0.1)
RBC # BLD AUTO: 4.67 MILLION/UL (ref 3.81–5.12)
RH BLD: POSITIVE
RPR SER QL: NORMAL
SAO2 % BLDCOV: 16.7 ML/DL
SODIUM SERPL-SCNC: 133 MMOL/L (ref 136–145)
SPECIMEN EXPIRATION DATE: NORMAL
WBC # BLD AUTO: 18.31 THOUSAND/UL (ref 4.31–10.16)

## 2022-02-06 PROCEDURE — 85027 COMPLETE CBC AUTOMATED: CPT | Performed by: OBSTETRICS & GYNECOLOGY

## 2022-02-06 PROCEDURE — 4A1HXCZ MONITORING OF PRODUCTS OF CONCEPTION, CARDIAC RATE, EXTERNAL APPROACH: ICD-10-PCS | Performed by: OBSTETRICS & GYNECOLOGY

## 2022-02-06 PROCEDURE — 80053 COMPREHEN METABOLIC PANEL: CPT | Performed by: OBSTETRICS & GYNECOLOGY

## 2022-02-06 PROCEDURE — 99024 POSTOP FOLLOW-UP VISIT: CPT | Performed by: OBSTETRICS & GYNECOLOGY

## 2022-02-06 PROCEDURE — 99212 OFFICE O/P EST SF 10 MIN: CPT

## 2022-02-06 PROCEDURE — 84156 ASSAY OF PROTEIN URINE: CPT | Performed by: OBSTETRICS & GYNECOLOGY

## 2022-02-06 PROCEDURE — 59510 CESAREAN DELIVERY: CPT | Performed by: OBSTETRICS & GYNECOLOGY

## 2022-02-06 PROCEDURE — 82805 BLOOD GASES W/O2 SATURATION: CPT | Performed by: OBSTETRICS & GYNECOLOGY

## 2022-02-06 PROCEDURE — 82570 ASSAY OF URINE CREATININE: CPT | Performed by: OBSTETRICS & GYNECOLOGY

## 2022-02-06 RX ORDER — ACETAMINOPHEN 325 MG/1
650 TABLET ORAL EVERY 4 HOURS PRN
Status: DISCONTINUED | OUTPATIENT
Start: 2022-02-06 | End: 2022-02-08 | Stop reason: HOSPADM

## 2022-02-06 RX ORDER — KETOROLAC TROMETHAMINE 30 MG/ML
30 INJECTION, SOLUTION INTRAMUSCULAR; INTRAVENOUS 3 TIMES DAILY PRN
Status: DISPENSED | OUTPATIENT
Start: 2022-02-06 | End: 2022-02-07

## 2022-02-06 RX ORDER — OXYTOCIN/RINGER'S LACTATE 30/500 ML
62.5 PLASTIC BAG, INJECTION (ML) INTRAVENOUS ONCE
Status: COMPLETED | OUTPATIENT
Start: 2022-02-06 | End: 2022-02-06

## 2022-02-06 RX ORDER — ONDANSETRON 2 MG/ML
INJECTION INTRAMUSCULAR; INTRAVENOUS AS NEEDED
Status: DISCONTINUED | OUTPATIENT
Start: 2022-02-06 | End: 2022-02-06

## 2022-02-06 RX ORDER — OXYTOCIN/RINGER'S LACTATE 30/500 ML
PLASTIC BAG, INJECTION (ML) INTRAVENOUS CONTINUOUS PRN
Status: DISCONTINUED | OUTPATIENT
Start: 2022-02-06 | End: 2022-02-06

## 2022-02-06 RX ORDER — SODIUM CHLORIDE, SODIUM LACTATE, POTASSIUM CHLORIDE, CALCIUM CHLORIDE 600; 310; 30; 20 MG/100ML; MG/100ML; MG/100ML; MG/100ML
INJECTION, SOLUTION INTRAVENOUS CONTINUOUS PRN
Status: DISCONTINUED | OUTPATIENT
Start: 2022-02-06 | End: 2022-02-06

## 2022-02-06 RX ORDER — ONDANSETRON 2 MG/ML
4 INJECTION INTRAMUSCULAR; INTRAVENOUS EVERY 8 HOURS PRN
Status: DISCONTINUED | OUTPATIENT
Start: 2022-02-06 | End: 2022-02-08 | Stop reason: HOSPADM

## 2022-02-06 RX ORDER — CEFAZOLIN SODIUM 1 G/50ML
SOLUTION INTRAVENOUS AS NEEDED
Status: DISCONTINUED | OUTPATIENT
Start: 2022-02-06 | End: 2022-02-06

## 2022-02-06 RX ORDER — CALCIUM CARBONATE 200(500)MG
1000 TABLET,CHEWABLE ORAL DAILY PRN
Status: DISCONTINUED | OUTPATIENT
Start: 2022-02-06 | End: 2022-02-08 | Stop reason: HOSPADM

## 2022-02-06 RX ORDER — MORPHINE SULFATE 1 MG/ML
INJECTION, SOLUTION EPIDURAL; INTRATHECAL; INTRAVENOUS AS NEEDED
Status: DISCONTINUED | OUTPATIENT
Start: 2022-02-06 | End: 2022-02-06

## 2022-02-06 RX ORDER — DEXAMETHASONE SODIUM PHOSPHATE 4 MG/ML
INJECTION, SOLUTION INTRA-ARTICULAR; INTRALESIONAL; INTRAMUSCULAR; INTRAVENOUS; SOFT TISSUE AS NEEDED
Status: DISCONTINUED | OUTPATIENT
Start: 2022-02-06 | End: 2022-02-06

## 2022-02-06 RX ORDER — DOCUSATE SODIUM 100 MG/1
100 CAPSULE, LIQUID FILLED ORAL 2 TIMES DAILY
Status: DISCONTINUED | OUTPATIENT
Start: 2022-02-06 | End: 2022-02-08 | Stop reason: HOSPADM

## 2022-02-06 RX ORDER — FENTANYL CITRATE 50 UG/ML
INJECTION, SOLUTION INTRAMUSCULAR; INTRAVENOUS AS NEEDED
Status: DISCONTINUED | OUTPATIENT
Start: 2022-02-06 | End: 2022-02-06

## 2022-02-06 RX ORDER — NALBUPHINE HCL 10 MG/ML
5 AMPUL (ML) INJECTION
Status: DISCONTINUED | OUTPATIENT
Start: 2022-02-06 | End: 2022-02-08 | Stop reason: HOSPADM

## 2022-02-06 RX ORDER — SIMETHICONE 80 MG
80 TABLET,CHEWABLE ORAL 4 TIMES DAILY PRN
Status: DISCONTINUED | OUTPATIENT
Start: 2022-02-06 | End: 2022-02-08 | Stop reason: HOSPADM

## 2022-02-06 RX ADMIN — Medication 250 MILLI-UNITS/MIN: at 04:08

## 2022-02-06 RX ADMIN — AZITHROMYCIN MONOHYDRATE 500 MG: 500 INJECTION, POWDER, LYOPHILIZED, FOR SOLUTION INTRAVENOUS at 04:07

## 2022-02-06 RX ADMIN — MORPHINE SULFATE 0.15 MG: 1 INJECTION, SOLUTION EPIDURAL; INTRATHECAL; INTRAVENOUS at 03:39

## 2022-02-06 RX ADMIN — DOCUSATE SODIUM 100 MG: 100 CAPSULE, LIQUID FILLED ORAL at 17:50

## 2022-02-06 RX ADMIN — SODIUM CHLORIDE, SODIUM LACTATE, POTASSIUM CHLORIDE, AND CALCIUM CHLORIDE: .6; .31; .03; .02 INJECTION, SOLUTION INTRAVENOUS at 03:32

## 2022-02-06 RX ADMIN — CEFAZOLIN SODIUM 1000 MG: 1 SOLUTION INTRAVENOUS at 03:44

## 2022-02-06 RX ADMIN — NALBUPHINE HYDROCHLORIDE 5 MG: 10 INJECTION, SOLUTION INTRAMUSCULAR; INTRAVENOUS; SUBCUTANEOUS at 05:19

## 2022-02-06 RX ADMIN — SIMETHICONE 80 MG: 80 TABLET, CHEWABLE ORAL at 20:26

## 2022-02-06 RX ADMIN — FENTANYL CITRATE 15 MCG: 50 INJECTION, SOLUTION INTRAMUSCULAR; INTRAVENOUS at 03:39

## 2022-02-06 RX ADMIN — KETOROLAC TROMETHAMINE 30 MG: 30 INJECTION, SOLUTION INTRAMUSCULAR; INTRAVENOUS at 09:42

## 2022-02-06 RX ADMIN — DEXAMETHASONE SODIUM PHOSPHATE 4 MG: 4 INJECTION, SOLUTION INTRAMUSCULAR; INTRAVENOUS at 04:17

## 2022-02-06 RX ADMIN — SODIUM CHLORIDE, SODIUM LACTATE, POTASSIUM CHLORIDE, AND CALCIUM CHLORIDE 125 ML/HR: .6; .31; .03; .02 INJECTION, SOLUTION INTRAVENOUS at 01:33

## 2022-02-06 RX ADMIN — ONDANSETRON 4 MG: 2 INJECTION INTRAMUSCULAR; INTRAVENOUS at 08:08

## 2022-02-06 RX ADMIN — ACETAMINOPHEN 650 MG: 325 TABLET, FILM COATED ORAL at 19:34

## 2022-02-06 RX ADMIN — Medication 62.5 MILLI-UNITS/MIN: at 05:22

## 2022-02-06 RX ADMIN — ONDANSETRON 4 MG: 2 INJECTION INTRAMUSCULAR; INTRAVENOUS at 04:17

## 2022-02-06 NOTE — OP NOTE
PERATIVE REPORT  PATIENT NAME: Romayne Alderman    :  1995  MRN: [id number]  Pt Location: [facility] L&D OR ROOM 01  Procedure(s) (LRB):   SECTION () REPEAT (N/A)    Specimen(s):  ID Type Source Tests Collected by Time Destination   A :  Tissue (Placenta on Hold) OB Only Placenta PLACENTA IN STORAGE Lesvia Gleason DO 2022 040    B :  Cord Blood Cord BLOOD GAS, VENOUS, CORD Lesvia Gleason,  2022 040    C :  Cord Blood Cord BLOOD GAS, ARTERIAL, CORD Lesvia Gleason,  2022 040        Estimated Blood Loss:   Minimal    Drains:  Urethral Catheter (Active)   Number of days: 0      Section Operative Report - OB/GYN   Romayne Alderman 31 y o  female MRN: [id number]  Unit/Bed#: -01 Encounter: [id number]    Indications: PROM, Labor    Pre-operative Diagnosis:   1  38 week 0 day pregnancy  2  Labor  3  PROM    Post-operative Diagnosis: same, delivered    Surgeon: Lesvia Gleason DO    Assistant(s): Johanne Rodney MD  Findings:  1  Delivery of viable female on 22 at 0404, weight 5lbs 14 2oz;  Apgar scores of 9 at one minute and 9 at five minutes  2  Normal appearing placenta with centrally-inserted 3 vessel cord  3  Nml amniotic fluid  4  Grossly normal uterus, tubes, and ovaries    Specimens:   1  Arterial and venous cord gases  2  Cord blood  3  Segment of umbilical cord  4  Placenta to storage     Estimated Blood Loss:  433 mL           Drains: Dupont catheter           Complications:  None; patient tolerated the procedure well  Disposition: PACU            Condition: stable    Procedure Details   Decision was made to proceed with  section due to patient presenting with PROM and labor  Patient was made aware of these findings and the proposed plan to proceed with a repeat LTCS  Risks, benefits, possible complications, alternate treatment options, and expected outcomes were discussed with the patient    The patient agreed with the proposed plan and gave informed consent  The patient was taken to the operating room where she was properly identified to the OR staff and attending physician  She was administered spinal anesthesia preoperatively  Fetal heart tones were appreciated and found to be appropriate  A Dupont catheter was aseptically inserted and SCDs were placed  The abdomen was prepped with Chloraprep and following appropriate drying time, the patient was draped in the usual sterile manner for a Pfannenstiel incision  The patient had received Ancef 2g IV pre-operatively for prophylaxis, followed by 500 mg of Zithromax I V  A Time Out was held and the above information confirmed  The patient was identified as Sayda Olsen and the procedure verified as  Delivery  A Pfannenstiel incision was made and carried down through the underlying subcutaneous tissue to the fascia using a scalpel  Rectus fascia was then incised in the midline and extended laterally using Stahl scissors  The superior edge of the fascia was grasped with Kocher clamps, tented upward, and the underlying muscle was bluntly dissected off  The inferior edge was grasped with Kocher clamps and cleared in similar fashion  All anatomic layers were well-demarcated  The rectus muscles were  and the peritoneum was identified and subsequently entered and extended longitudinally with blunt dissection  The bladder blade was inserted  A low transverse uterine incision was made with the scalpel and extended laterally with blunt dissection  The amnion was entered sharply  Surgeons hand was inserted through the hysterotomy and the fetal head was palpated, elevated, and delivered through the uterine incision with the assistance of fundal pressure  Baby had spontaneous cry with good color and tone  The umbilical cord was clamped and cut  The infant was handed off to the  providers    Arterial and venous cord gases, cord blood, and a segment of umbilical cord were obtained for evaluation and promptly sent to the lab  The placenta delivered spontaneously with uterine fundal massage and was noted to have a centrally inserted 3 vessel cord  This was also sent to the lab for placental pathology  The uterus was exteriorized and a moist lap sponge was used to clear the cavity of clots and products of conception  The uterine incision was closed with a running locked suture of 0 Vicryl  A second layer of the same suture was used to imbricate the first   Good hemostasis was confirmed upon uterine closure  The uterus was returned to the abdomen  The paracolic gutters were inspected and cleared of all clots and debris with irrigation and moist lap sponges  The fascia was closed with a running suture of 0 Vicryl  The skin was closed with Insorb staples  Sterile dressing was applied and an abdominal binder was then placed  At the conclusion of the procedure, all needle, sponge, and instrument counts were noted to be correct x2  The patient tolerated the procedure well and was transferred to her the recovery room in stable condition         SIGNATURE: Mary Butler, DO  DATE: February 6, 2022  TIME: 4:49 AM

## 2022-02-06 NOTE — TELEPHONE ENCOUNTER
Pt called stating she is having painful CTXs q 5 minutes and her "water broke 20 minutes ago"  Advised patient to present to L&D    Pt was scheduled for repeat C/S for 2/14/2022

## 2022-02-06 NOTE — PLAN OF CARE

## 2022-02-06 NOTE — ANESTHESIA PREPROCEDURE EVALUATION
Procedure:   SECTION () REPEAT (N/A Uterus)    Relevant Problems   GYN   (+) Short interval between pregnancies complicating pregnancy, antepartum, third trimester      HEMATOLOGY   (+) Anemia during pregnancy in third trimester      NEURO/PSYCH   (+) History of prior pregnancy with IUGR         Physical Exam    Airway      TM Distance: >3 FB  Neck ROM: full     Dental       Cardiovascular  Cardiovascular exam normal    Pulmonary  Pulmonary exam normal     Other Findings        Anesthesia Plan  ASA Score- 2     Anesthesia Type- spinal with ASA Monitors  Additional Monitors:   Airway Plan:           Plan Factors-Exercise tolerance (METS): >4 METS  Chart reviewed  EKG reviewed  Imaging results reviewed  Existing labs reviewed  Patient summary reviewed  Patient is not a current smoker  Patient instructed to abstain from smoking on day of procedure  Patient did not smoke on day of surgery  Obstructive sleep apnea risk education given perioperatively  Induction-     Postoperative Plan- Plan for postoperative opioid use  Informed Consent- Anesthetic plan and risks discussed with patient  I personally reviewed this patient with the CRNA  Discussed and agreed on the Anesthesia Plan with the CRNA  Gerhardt Sep

## 2022-02-06 NOTE — PLAN OF CARE

## 2022-02-06 NOTE — NURSING NOTE
Anesthesia notified of patients pulse of 50-56 radially  Aware, stated pulse has been that since being in the OR

## 2022-02-06 NOTE — ANESTHESIA POSTPROCEDURE EVALUATION
Post-Op Assessment Note    CV Status:  Stable  Pain Score: 0    Pain management: adequate     Mental Status:  Alert and sleepy   Hydration Status:  Euvolemic   PONV Controlled:  Controlled   Airway Patency:  Patent      Post Op Vitals Reviewed: Yes      Staff: CRNA         No complications documented      BP      Temp      Pulse     Resp      SpO2

## 2022-02-06 NOTE — PROGRESS NOTES
Triage Note - OB  Rhiannon Miller 31 y o  female MRN: [id number]  Unit/Bed#: LD TRIAGE  Encounter: [id number]    Chief Complaint: painful frequent contractions and leakage of fluid   JENNIFER: Estimated Date of Delivery: 22    HPI: Patient is a  at 37w6d here due to possible labor and leakage of fluid that started at 10 PM today     JENNIFER: Problems (from 21 to present)     Problem Noted Resolved    Short interval between pregnancies complicating pregnancy, antepartum, third trimester 1/10/2022 by Nay Cárdenas MD No    History of prior pregnancy with IUGR  10/13/2021 by Tr Thakur MD No    Overview Addendum 2021 11:01 AM by Ramon Corral MD     First child 5#1oz @ 38 weeks  May be constitutional as pt is small and was less than 5 # @ 36 weeks  Will get f/up growth U/S as recommended  2021 28 week 1097 grams - 2 lbs 7 oz (24%)  - f/u teresa'd 1/10/2022         Previous Version    Anemia during pregnancy in third trimester 2021 by Mile Sheets MD No    Overview Addendum 2021  3:36 PM by Ramon Corral MD     9/15/2021  hgb 10 0g/dl  2021 hgb 7 5g/dl - reviewed w/ pt - recom IV iron  Orders placed  Previous Version    Maternal care due to low transverse uterine scar from previous  delivery 2021 by Tr Thakur MD No    Overview Addendum 2021 10:59 AM by Ramon Corral MD     Primary  section for breech  Considering repeat  due to short interval pregnancy  Repeat LTCS teresa'd 2021         Previous Version    Short interval between pregnancies affecting pregnancy in first trimester, antepartum 2021 by Laurel Harris MD 2022 by Mile Sheets MD          Vitals:   LMP 2021 (Within Weeks)   There is no height or weight on file to calculate BMI      Physical Exam  GEN: AAO x 3, very uncomfortable with contractions   ABD: NT, gravid  SVE:  /-1    FHT:  +accels, no decels, mod variability     TOCO:  CTXs q 2-3 minutes       Labs:  Ferning noted    Imaging: VTX confirmed via bedside U/S    Lab, Imaging and other studies: I have personally reviewed pertinent reports  A/P:  at 37w6d presents with complaint of painful contractions and leakage of fluid    1) Positive ferning, grossly ruptured   2) Admit to L&D for repeat  due to PROM and labor      KATHY Leyva

## 2022-02-06 NOTE — ANESTHESIA PROCEDURE NOTES
Spinal Block    Patient location during procedure: OR  Start time: 2/6/2022 3:39 AM  Reason for block: primary anesthetic  Staffing  Performed: CRNA   Preanesthetic Checklist  Completed: patient identified, IV checked, site marked, risks and benefits discussed, surgical consent, monitors and equipment checked, pre-op evaluation and timeout performed  Spinal Block  Patient position: sitting  Prep: ChloraPrep  Patient monitoring: heart rate and continuous pulse ox  Approach: midline  Location: L3-4  Injection technique: single-shot  Needle  Needle type: pencil-tip   Needle gauge: 25 G  Needle length: 10 cm  Assessment  Sensory level: T4  Events: cerebrospinal fluid  Injection Assessment:  negative aspiration for heme, no paresthesia on injection and positive aspiration for clear CSF    Post-procedure:  pressure dressing applied

## 2022-02-06 NOTE — H&P
History & Physical - OB/GYN   Shruti Conklin 31 y o  female MRN: [id number]  Unit/Bed#: LD TRIAGE  Encounter: [id number]    31 y o  yo  at [place] [address] with JENNIFER of 2022, by Last Menstrual Period  She is a patient of [address]  Chief complaint:  Painful contractions and leakage of fluid    HPI:  Contractions:  yes  Fetal movement:  yes  Vaginal bleeding:   no  Leaking of fluid:  yes      Pregnancy Complications:  G3 U5 [id number] Problems (from 21 to present)     Problem Noted Resolved    Short interval between pregnancies complicating pregnancy, antepartum, third trimester 1/10/2022 by Jolene Cruz MD No    History of prior pregnancy with IUGR  10/13/2021 by Freddy Zapata MD No    Overview Addendum 2021 11:01 AM by Daryle Furlough, MD     First child 5#1oz @ 38 weeks  May be constitutional as pt is small and was less than 5 # @ 36 weeks  Will get f/up growth U/S as recommended  2021 28 week 1097 grams - 2 lbs 7 oz (24%)  - f/u teresa'd 1/10/2022         Previous Version    Anemia during pregnancy in third trimester 2021 by Janine Umanzor MD No    Overview Addendum 2021  3:36 PM by Daryle Furlough, MD     9/15/2021  hgb 10 0g/dl  2021 hgb 7 5g/dl - reviewed w/ pt - recom IV iron  Orders placed  Previous Version    Maternal care due to low transverse uterine scar from previous  delivery 2021 by Freddy Zapata MD No    Overview Addendum 2021 10:59 AM by Daryle Furlough, MD     Primary  section for breech  Considering repeat  due to short interval pregnancy      Repeat LTCS teresa'd 2021         Previous Version    Short interval between pregnancies affecting pregnancy in first trimester, antepartum 2021 by Imtiaz Olmedo MD 2022 by Janine Umanzor MD              PMH:  Past Medical History:   Diagnosis Date    Headache, migraine     Hemorrhoids     Irritable bowel syndrome     Ovarian cyst, left     Papanicolaou smear 2019    Stomach ulcer     Syncope        PSH:  Past Surgical History:   Procedure Laterality Date     SECTION      x1    COLONOSCOPY      TONSILLECTOMY  2002    WISDOM TOOTH EXTRACTION  2012       Social Hx:  Social History     Tobacco Use    Smoking status: Never Smoker    Smokeless tobacco: Never Used   Vaping Use    Vaping Use: Never used   Substance Use Topics    Alcohol use: Never    Drug use: Never          OB Hx:  OB History    Para Term  AB Living   3 1 1 0 1 1   SAB IAB Ectopic Multiple Live Births   1 0 0 0 1      # Outcome Date GA Lbr Mp/2nd Weight Sex Delivery Anes PTL Lv   3 Current            2 Term 20 38w3d  2296 g (5 lb 1 oz) F CS-LTranv Spinal  LINDA      Birth Comments: Apgars 9/9   1 SAB               Obstetric Comments   Menarche: 11        Meds:  No current facility-administered medications on file prior to encounter  Current Outpatient Medications on File Prior to Encounter   Medication Sig Dispense Refill    Prenatal Vit-Fe Fumarate-FA (PRENATAL VITAMINS PO) Take by mouth         Allergies:  No Known Allergies    Labs:  ABO Grouping   Date Value Ref Range Status   09/15/2021 A  Final      Rh Type   Date Value Ref Range Status   09/15/2021 Positive  Final     Comment:     Please note: Prior records for this patient's ABO / Rh type are not  available for additional verification  Rh Type   Date Value Ref Range Status   09/15/2021 Positive  Final     Comment:     Please note: Prior records for this patient's ABO / Rh type are not  available for additional verification         HIV-1/HIV-2 AB   Date Value Ref Range Status   09/15/2021 Non-Reactive  Final     Hepatitis B Surface Ag   Date Value Ref Range Status   09/15/2021 Negative  Final     HEP C AB   Date Value Ref Range Status   09/15/2021 <0 1 0 0 - 0 9 s/co ratio Final     Comment:                                       Negative: < 0 8                               Indeterminate: 0 8 - 0 9                                    Positive:     > 0 9   The CDC recommends that a positive HCV antibody result   be followed up with a HCV Nucleic Acid Amplification   test (579537)  RPR   Date Value Ref Range Status   2021 Non Reactive Non Reactive Final     No results found for: RUBELLAIGGQT  Glucose   Date Value Ref Range Status   2021 106 65 - 139 mg/dL Final     Comment:     According to ADA, a glucose threshold of >139 mg/dL after 50-gram  load identifies approximately 80% of women with gestational  diabetes mellitus, while the sensitivity is further increased to  approximately 90% by a threshold of >129 mg/dL  No results found for: POMOJCL3TD  Strep Grp B TERRANCE   Date Value Ref Range Status   2022 Negative Negative Final     Comment:     Centers for Disease Control and Prevention (CDC) and American Congress  of Obstetricians and Gynecologists (ACOG) guidelines for prevention of   group B streptococcal (GBS) disease specify co-collection of  a vaginal and rectal swab specimen to maximize sensitivity of GBS  detection  Per the CDC and ACOG, swabbing both the lower vagina and  rectum substantially increases the yield of detection compared with  sampling the vagina alone  Penicillin G, ampicillin, or cefazolin are indicated for intrapartum  prophylaxis of  GBS colonization  Reflex susceptibility  testing should be performed prior to use of clindamycin only on GBS  isolates from penicillin-allergic women who are considered a high risk  for anaphylaxis  Treatment with vancomycin without additional testing  is warranted if resistance to clindamycin is noted           Physical Exam:  LMP 2021 (Within Weeks)     Gen: NAD/ uncomfortable with contractions  Heart: RRR  Lungs: CTA b/l  Abdomen: gravid, non-tender, non-distended  Extremities: non-tender, no edema    Estimated Fetal Weight: 6 lbs  Presentation: VTX, confirmed via u/s    SVE:   /-1    FHT:  +accels, mod variability, no decels     TOCO:  q 3-4 mins       Membranes: ruptured      Assessment:   31 y o   at 37w6d weeks presents with spontaneous rupture of membranes    Plan:   1  Admit to L&D for repeat   2  Place IV and IV fluids  3  Labs: CBC, RPR, type and screen  4   Spinal for anesthesia

## 2022-02-07 LAB
ERYTHROCYTE [DISTWIDTH] IN BLOOD BY AUTOMATED COUNT: 21.8 % (ref 11.6–15.1)
HCT VFR BLD AUTO: 30 % (ref 34.8–46.1)
HGB BLD-MCNC: 9.5 G/DL (ref 11.5–15.4)
MCH RBC QN AUTO: 26.8 PG (ref 26.8–34.3)
MCHC RBC AUTO-ENTMCNC: 31.7 G/DL (ref 31.4–37.4)
MCV RBC AUTO: 85 FL (ref 82–98)
PLATELET # BLD AUTO: 151 THOUSANDS/UL (ref 149–390)
PMV BLD AUTO: 11.8 FL (ref 8.9–12.7)
RBC # BLD AUTO: 3.54 MILLION/UL (ref 3.81–5.12)
WBC # BLD AUTO: 12.46 THOUSAND/UL (ref 4.31–10.16)

## 2022-02-07 PROCEDURE — 85027 COMPLETE CBC AUTOMATED: CPT | Performed by: OBSTETRICS & GYNECOLOGY

## 2022-02-07 PROCEDURE — 99024 POSTOP FOLLOW-UP VISIT: CPT | Performed by: OBSTETRICS & GYNECOLOGY

## 2022-02-07 RX ORDER — OXYCODONE HYDROCHLORIDE 5 MG/1
5 TABLET ORAL EVERY 4 HOURS PRN
Status: DISCONTINUED | OUTPATIENT
Start: 2022-02-07 | End: 2022-02-08 | Stop reason: HOSPADM

## 2022-02-07 RX ORDER — IBUPROFEN 600 MG/1
600 TABLET ORAL EVERY 6 HOURS PRN
Status: DISCONTINUED | OUTPATIENT
Start: 2022-02-07 | End: 2022-02-08 | Stop reason: HOSPADM

## 2022-02-07 RX ADMIN — ACETAMINOPHEN 650 MG: 325 TABLET, FILM COATED ORAL at 09:44

## 2022-02-07 RX ADMIN — IBUPROFEN 600 MG: 600 TABLET ORAL at 22:55

## 2022-02-07 RX ADMIN — IBUPROFEN 600 MG: 600 TABLET ORAL at 15:48

## 2022-02-07 RX ADMIN — OXYCODONE HYDROCHLORIDE 5 MG: 5 TABLET ORAL at 16:15

## 2022-02-07 RX ADMIN — IBUPROFEN 600 MG: 600 TABLET ORAL at 09:44

## 2022-02-07 RX ADMIN — DOCUSATE SODIUM 100 MG: 100 CAPSULE, LIQUID FILLED ORAL at 09:15

## 2022-02-07 RX ADMIN — SIMETHICONE 80 MG: 80 TABLET, CHEWABLE ORAL at 23:02

## 2022-02-07 RX ADMIN — ACETAMINOPHEN 650 MG: 325 TABLET, FILM COATED ORAL at 13:50

## 2022-02-07 RX ADMIN — SIMETHICONE 80 MG: 80 TABLET, CHEWABLE ORAL at 09:15

## 2022-02-07 RX ADMIN — ACETAMINOPHEN 650 MG: 325 TABLET, FILM COATED ORAL at 01:21

## 2022-02-07 RX ADMIN — ACETAMINOPHEN 650 MG: 325 TABLET, FILM COATED ORAL at 05:29

## 2022-02-07 RX ADMIN — ACETAMINOPHEN 650 MG: 325 TABLET, FILM COATED ORAL at 19:33

## 2022-02-07 RX ADMIN — DOCUSATE SODIUM 100 MG: 100 CAPSULE, LIQUID FILLED ORAL at 17:39

## 2022-02-07 NOTE — PLAN OF CARE

## 2022-02-07 NOTE — PLAN OF CARE

## 2022-02-07 NOTE — PROGRESS NOTES
Progress Note - OB/GYN  Post-Partum Physician Note   Romayne Alderman 31 y o  female MRN: [id number]  Unit/Bed#: -01 Encounter: [id number]    Patient is postop and postpartum day 1 from a  (repeat x 1) with spinal anesthesia      Subjective:   Pain: yes  Tolerating Oral Intake:2 yes  Voiding: yes  Flatus: yes  Bowel Movement: no  Ambulating: yes  Breastfeeding: Breastfeeding  Chest Pain: no  Shortness of Breath: no  Leg Pain/Discomfort: no  Lochia: minimal  Other:     Objective:   Vitals:    22 1924 22 2228 22 0400 22 0730   BP: 104/63 108/72 98/65 107/65   BP Location: Right arm Right arm Right arm Right arm   Pulse: (!) 50 70 (!) 51 (!) 48   Resp: 16 18 18 14   Temp: 98 7 °F (37 1 °C) 98 7 °F (37 1 °C) 97 7 °F (36 5 °C) 99 1 °F (37 3 °C)   TempSrc: Oral Temporal Oral Temporal   SpO2: 100% 96% 97% 97%   Weight:       Height:           Intake/Output Summary (Last 24 hours) at 2022 0844  Last data filed at 2022 1950  Gross per 24 hour   Intake 1388 54 ml   Output 1000 ml   Net 388 54 ml       Physical Exam:  General: in no apparent distress, well developed and well nourished, non-toxic, in no respiratory distress and acyanotic, alert, oriented times 3, afebrile and normal vitals  Abdomen: abdomen is soft without significant tenderness, masses, organomegaly or guarding  Fundus: Firm, 1 below the umbilicus  Incision: C/D/I  Lower extremeties: tender  Other:     Labs/Tests:   Recent Results (from the past 24 hour(s))   CBC    Collection Time: 22  5:02 AM   Result Value Ref Range    WBC 12 46 (H) 4 31 - 10 16 Thousand/uL    RBC 3 54 (L) 3 81 - 5 12 Million/uL    Hemoglobin 9 5 (L) 11 5 - 15 4 g/dL    Hematocrit 30 0 (L) 34 8 - 46 1 %    MCV 85 82 - 98 fL    MCH 26 8 26 8 - 34 3 pg    MCHC 31 7 31 4 - 37 4 g/dL    RDW 21 8 (H) 11 6 - 15 1 %    Platelets 122 597 - 853 Thousands/uL    MPV 11 8 8 9 - 12 7 fL           Brief OB Lab review:  ABO Grouping   Date Value Ref Range Status   2022 A  Final      Rh Factor   Date Value Ref Range Status   2022 Positive  Final     Rh Type   Date Value Ref Range Status   09/15/2021 Positive  Final     Comment:     Please note: Prior records for this patient's ABO / Rh type are not  available for additional verification  No results found for: ANTIBODYSCR  No results found for: RUBM    MEDS:   Current Facility-Administered Medications   Medication Dose Route Frequency    acetaminophen (TYLENOL) tablet 650 mg  650 mg Oral Q4H PRN    azithromycin (ZITHROMAX) 500 mg in sodium chloride 0 9% 250mL IVPB 500 mg  500 mg Intravenous Once    calcium carbonate (TUMS) chewable tablet 1,000 mg  1,000 mg Oral Daily PRN    ceFAZolin (ANCEF) IVPB (premix in dextrose) 2,000 mg 50 mL  2,000 mg Intravenous Once    docusate sodium (COLACE) capsule 100 mg  100 mg Oral BID    lactated ringers infusion  125 mL/hr Intravenous Continuous    morphine injection 2 mg  2 mg Intravenous 4x Daily PRN    nalbuphine (NUBAIN) injection 5 mg  5 mg Intravenous Q3H PRN    ondansetron (ZOFRAN) injection 4 mg  4 mg Intravenous Q8H PRN    ondansetron (ZOFRAN) injection 4 mg  4 mg Intravenous Q8H PRN    simethicone (MYLICON) chewable tablet 80 mg  80 mg Oral 4x Daily PRN     Invasive Devices  Report    Peripheral Intravenous Line            Peripheral IV 22 Dorsal (posterior); Left Forearm 1 day                Assessment and Plan:  31y o  year-old , postpartum day 1 status-post repeat LTCS  Continue routine postpartum care  Encourage ambulation  Advance diet as tolerated  Possible C/S tomorrow      No problem-specific Assessment & Plan notes found for this encounter        Marifer Mariee MD

## 2022-02-08 VITALS
WEIGHT: 113 LBS | DIASTOLIC BLOOD PRESSURE: 70 MMHG | HEIGHT: 57 IN | SYSTOLIC BLOOD PRESSURE: 110 MMHG | OXYGEN SATURATION: 99 % | TEMPERATURE: 98.5 F | RESPIRATION RATE: 18 BRPM | BODY MASS INDEX: 24.38 KG/M2 | HEART RATE: 71 BPM

## 2022-02-08 PROCEDURE — 99024 POSTOP FOLLOW-UP VISIT: CPT | Performed by: OBSTETRICS & GYNECOLOGY

## 2022-02-08 RX ORDER — IBUPROFEN 600 MG/1
600 TABLET ORAL EVERY 6 HOURS PRN
Qty: 30 TABLET | Refills: 0 | Status: SHIPPED | OUTPATIENT
Start: 2022-02-08 | End: 2022-06-08

## 2022-02-08 RX ORDER — ACETAMINOPHEN 325 MG/1
650 TABLET ORAL EVERY 4 HOURS PRN
Qty: 30 TABLET | Refills: 0 | Status: SHIPPED | OUTPATIENT
Start: 2022-02-08 | End: 2022-06-08

## 2022-02-08 RX ORDER — OXYCODONE HYDROCHLORIDE 5 MG/1
5 TABLET ORAL EVERY 6 HOURS PRN
Qty: 6 TABLET | Refills: 0 | Status: SHIPPED | OUTPATIENT
Start: 2022-02-08 | End: 2022-02-13

## 2022-02-08 RX ADMIN — ACETAMINOPHEN 650 MG: 325 TABLET, FILM COATED ORAL at 00:30

## 2022-02-08 RX ADMIN — OXYCODONE HYDROCHLORIDE 5 MG: 5 TABLET ORAL at 07:55

## 2022-02-08 RX ADMIN — DOCUSATE SODIUM 100 MG: 100 CAPSULE, LIQUID FILLED ORAL at 07:54

## 2022-02-08 RX ADMIN — IBUPROFEN 600 MG: 600 TABLET ORAL at 05:45

## 2022-02-08 NOTE — PLAN OF CARE
Problem: POSTPARTUM  Goal: Experiences normal postpartum course  Description: INTERVENTIONS:  - Monitor maternal vital signs  - Assess uterine involution and lochia  Outcome: Adequate for Discharge  Goal: Appropriate maternal -  bonding  Description: INTERVENTIONS:  - Identify family support  - Assess for appropriate maternal/infant bonding   -Encourage maternal/infant bonding opportunities  - Referral to  or  as needed  Outcome: Adequate for Discharge  Goal: Establishment of infant feeding pattern  Description: INTERVENTIONS:  - Assess breast/bottle feeding  - Refer to lactation as needed  Outcome: Adequate for Discharge  Goal: Incision(s), wounds(s) or drain site(s) healing without S/S of infection  Description: INTERVENTIONS  - Assess and document dressing, incision, wound bed, drain sites and surrounding tissue  - Provide patient and family education  - Perform skin care/dressing changes every   Outcome: Adequate for Discharge     Problem: PAIN - ADULT  Goal: Verbalizes/displays adequate comfort level or baseline comfort level  Description: Interventions:  - Encourage patient to monitor pain and request assistance  - Assess pain using appropriate pain scale  - Administer analgesics based on type and severity of pain and evaluate response  - Implement non-pharmacological measures as appropriate and evaluate response  - Consider cultural and social influences on pain and pain management  - Notify physician/advanced practitioner if interventions unsuccessful or patient reports new pain  Outcome: Adequate for Discharge     Problem: INFECTION - ADULT  Goal: Absence or prevention of progression during hospitalization  Description: INTERVENTIONS:  - Assess and monitor for signs and symptoms of infection  - Monitor lab/diagnostic results  - Monitor all insertion sites, i e  indwelling lines, tubes, and drains  - Monitor endotracheal if appropriate and nasal secretions for changes in amount and color  - [place] appropriate cooling/warming therapies per order  - Administer medications as ordered  - Instruct and encourage patient and family to use good hand hygiene technique  - Identify and instruct in appropriate isolation precautions for identified infection/condition  Outcome: Adequate for Discharge  Goal: Absence of fever/infection during neutropenic period  Description: INTERVENTIONS:  - Monitor WBC    Outcome: Adequate for Discharge     Problem: SAFETY ADULT  Goal: Patient will remain free of falls  Description: INTERVENTIONS:  - Educate patient/family on patient safety including physical limitations  - Instruct patient to call for assistance with activity   - Consult OT/PT to assist with strengthening/mobility   - Keep Call bell within reach  - Keep bed low and locked with side rails adjusted as appropriate  - Keep care items and personal belongings within reach  - Initiate and maintain comfort rounds  - Make Fall Risk Sign visible to staff  - Offer Toileting every  Hours, in advance of need  - Initiate/Maintain alarm  - Obtain necessary fall risk management equipment:   - Apply yellow socks and bracelet for high fall risk patients  - Consider moving patient to room near nurses station  Outcome: Adequate for Discharge  Goal: Maintain or return to baseline ADL function  Description: INTERVENTIONS:  -  Assess patient's ability to carry out ADLs; assess patient's baseline for ADL function and identify physical deficits which impact ability to perform ADLs (bathing, care of mouth/teeth, toileting, grooming, dressing, etc )  - Assess/evaluate cause of self-care deficits   - Assess range of motion  - Assess patient's mobility; develop plan if impaired  - Assess patient's need for assistive devices and provide as appropriate  - Encourage maximum independence but intervene and supervise when necessary  - Involve family in performance of ADLs  - Assess for home care needs following discharge   - Consider OT consult to assist with ADL evaluation and planning for discharge  - Provide patient education as appropriate  Outcome: Adequate for Discharge  Goal: Maintains/Returns to pre admission functional level  Description: INTERVENTIONS:  - Perform BMAT or MOVE assessment daily    - Set and communicate daily mobility goal to care team and patient/family/caregiver  - Collaborate with rehabilitation services on mobility goals if consulted  - Perform Range of Motion  times a day  - Reposition patient every  hours  - Dangle patient  times a day  - Stand patient  times a day  - Ambulate patient  times a day  - Out of bed to chair  times a day   - Out of bed for meals  times a day  - Out of bed for toileting  - Record patient progress and toleration of activity level   Outcome: Adequate for Discharge     Problem: Knowledge Deficit  Goal: Patient/family/caregiver demonstrates understanding of disease process, treatment plan, medications, and discharge instructions  Description: Complete learning assessment and assess knowledge base    Interventions:  - Provide teaching at level of understanding  - Provide teaching via preferred learning methods  Outcome: Adequate for Discharge     Problem: DISCHARGE PLANNING  Goal: Discharge to home or other facility with appropriate resources  Description: INTERVENTIONS:  - Identify barriers to discharge w/patient and caregiver  - Arrange for needed discharge resources and transportation as appropriate  - Identify discharge learning needs (meds, wound care, etc )  - Arrange for interpretive services to assist at discharge as needed  - Refer to Case Management Department for coordinating discharge planning if the patient needs post-hospital services based on physician/advanced practitioner order or complex needs related to functional status, cognitive ability, or social support system  Outcome: Adequate for Discharge     Problem: Potential for Falls  Goal: Patient will remain free of falls  Description: INTERVENTIONS:  - Educate patient/family on patient safety including physical limitations  - Instruct patient to call for assistance with activity   - Consult OT/PT to assist with strengthening/mobility   - Keep Call bell within reach  - Keep bed low and locked with side rails adjusted as appropriate  - Keep care items and personal belongings within reach  - Initiate and maintain comfort rounds  - Make Fall Risk Sign visible to staff  - Offer Toileting every  Hours, in advance of need  - Initiate/Maintain alarm  - Obtain necessary fall risk management equipment:  - Apply yellow socks and bracelet for high fall risk patients  - Consider moving patient to room near nurses station  Outcome: Adequate for Discharge

## 2022-02-08 NOTE — DISCHARGE SUMMARY
CS Discharge Summary - Sayda Olsen 31 y o  female MRN: [id number]    Unit/Bed#: -01 Encounter: [id number]    Admission Date: 2022     Discharge Date: 22    Admitting Diagnosis: ROM (rupture of membranes), premature [O42 90]    Discharge Diagnosis: same    Procedures: repeat  section, low transverse incision    Attending: Liz Byers DO    Hospital Course:     Sayda Olsen is a 31 y o  [place] who was admitted at 37 wks in [facility]  She had a history of 1 prior  and desired repeat for delivery  She underwent an uncomplicated  section   was transferred to  nursery  Patient tolerated the procedure well and was transferred to recovery in stable condition  Her post-operative course was uncomplicated  Preoperative hemaglobin was11 5, postoperative was 9 5  Her postoperative pain was well controlled with oral analgesics  On day of discharge, she was ambulating and able to reasonably perform all ADLs  She was voiding and had appropriate bowel function  Pain was well controlled  She was discharged home on post-operative day #2 without complications  Patient was instructed to follow up with her OB as an outpatient and was given appropriate warnings to call provider if she develops signs of infection or uncontrolled pain  Complications: None    Condition at discharge: good     Discharge instructions/Information to patient and family:   See after visit summary for information provided to patient and family  Provisions for Follow-Up Care:  See after visit summary for information related to follow-up care and any pertinent home health orders  Disposition: Home    Planned Readmission: No    Discharge Medications: For a complete list of the patient's medications, please refer to her med rec

## 2022-02-08 NOTE — PROGRESS NOTES
Post- Progress note    Patient is post-op day 2 from a Repeat Low Transverse  delivery  Pain: yes, well controlled with oral medications  Tolerating Oral Intake: yes  Voiding: yes  Flatus: yes  Ambulating: yes  Breastfeeding: Breast and formula feeding  Chest Pain: no  Shortness of Breath: no  Leg Pain/Discomfort: no  Lochia: normal    Objective:   Vitals:    22 0700   BP: 110/70   Pulse: 71   Resp: 18   Temp:    SpO2: 99%     No intake or output data in the 24 hours ending 22 1041    Physical Exam:  General: in no apparent distress and well developed and well nourished  Abdomen: abdomen is soft without significant tenderness, masses, organomegaly or guarding  Fundus: Firm and non-tender, 3 below the umbilicus  Incision: clean, dry, and intact  Lower extremeties: nontender    Labs/Tests:   Lab Results   Component Value Date    WBC 12 46 (H) 2022    HGB 9 5 (L) 2022    HCT 30 0 (L) 2022    MCV 85 2022     2022       Assessment and Plan:  31y o  year-old , postoperative day 2 status-post Repeat Low Transverse  delivery    Doing well  OK for dc today      Gerry Bhatia MD

## 2022-02-08 NOTE — NURSING NOTE
Discharge teaching done  Patient and spouse understand teaching and all questions were answered prior to discharge

## 2022-02-08 NOTE — PLAN OF CARE
Problem: POSTPARTUM  Goal: Experiences normal postpartum course  Description: INTERVENTIONS:  - Monitor maternal vital signs  - Assess uterine involution and lochia  Outcome: Progressing  Goal: Appropriate maternal -  bonding  Description: INTERVENTIONS:  - Identify family support  - Assess for appropriate maternal/infant bonding   -Encourage maternal/infant bonding opportunities  - Referral to  or  as needed  Outcome: Progressing  Goal: Establishment of infant feeding pattern  Description: INTERVENTIONS:  - Assess breast/bottle feeding  - Refer to lactation as needed  Outcome: Progressing  Goal: Incision(s), wounds(s) or drain site(s) healing without S/S of infection  Description: INTERVENTIONS  - Assess and document dressing, incision, wound bed, drain sites and surrounding tissue  - Provide patient and family education  - Perform skin care/dressing changes every  Outcome: Progressing     Problem: PAIN - ADULT  Goal: Verbalizes/displays adequate comfort level or baseline comfort level  Description: Interventions:  - Encourage patient to monitor pain and request assistance  - Assess pain using appropriate pain scale  - Administer analgesics based on type and severity of pain and evaluate response  - Implement non-pharmacological measures as appropriate and evaluate response  - Consider cultural and social influences on pain and pain management  - Notify physician/advanced practitioner if interventions unsuccessful or patient reports new pain  Outcome: Progressing     Problem: INFECTION - ADULT  Goal: Absence or prevention of progression during hospitalization  Description: INTERVENTIONS:  - Assess and monitor for signs and symptoms of infection  - Monitor lab/diagnostic results  - Monitor all insertion sites, i e  indwelling lines, tubes, and drains  - Monitor endotracheal if appropriate and nasal secretions for changes in amount and color  - [place] appropriate cooling/warming therapies per order  - Administer medications as ordered  - Instruct and encourage patient and family to use good hand hygiene technique  - Identify and instruct in appropriate isolation precautions for identified infection/condition  Outcome: Progressing  Goal: Absence of fever/infection during neutropenic period  Description: INTERVENTIONS:  - Monitor WBC    Outcome: Progressing     Problem: SAFETY ADULT  Goal: Patient will remain free of falls  Description: INTERVENTIONS:  - Educate patient/family on patient safety including physical limitations  - Instruct patient to call for assistance with activity   - Consult OT/PT to assist with strengthening/mobility   - Keep Call bell within reach  - Keep bed low and locked with side rails adjusted as appropriate  - Keep care items and personal belongings within reach  - Initiate and maintain comfort rounds  - Make Fall Risk Sign visible to staff  - Offer Toileting every  Hours, in advance of need  - Initiate/Maintain alarm  - Obtain necessary fall risk management equipment:   - Apply yellow socks and bracelet for high fall risk patients  - Consider moving patient to room near nurses station  Outcome: Progressing  Goal: Maintain or return to baseline ADL function  Description: INTERVENTIONS:  -  Assess patient's ability to carry out ADLs; assess patient's baseline for ADL function and identify physical deficits which impact ability to perform ADLs (bathing, care of mouth/teeth, toileting, grooming, dressing, etc )  - Assess/evaluate cause of self-care deficits   - Assess range of motion  - Assess patient's mobility; develop plan if impaired  - Assess patient's need for assistive devices and provide as appropriate  - Encourage maximum independence but intervene and supervise when necessary  - Involve family in performance of ADLs  - Assess for home care needs following discharge   - Consider OT consult to assist with ADL evaluation and planning for discharge  - Provide patient education as appropriate  Outcome: Progressing  Goal: Maintains/Returns to pre admission functional level  Description: INTERVENTIONS:  - Perform BMAT or MOVE assessment daily    - Set and communicate daily mobility goal to care team and patient/family/caregiver  - Collaborate with rehabilitation services on mobility goals if consulted  - Perform Range of Motion  times a day  - Reposition patient every  hours  - Dangle patient  times a day  - Stand patient  times a day  - Ambulate patient  times a day  - Out of bed to chair  times a day   - Out of bed for meals  times a day  - Out of bed for toileting  - Record patient progress and toleration of activity level   Outcome: Progressing     Problem: Knowledge Deficit  Goal: Patient/family/caregiver demonstrates understanding of disease process, treatment plan, medications, and discharge instructions  Description: Complete learning assessment and assess knowledge base    Interventions:  - Provide teaching at level of understanding  - Provide teaching via preferred learning methods  Outcome: Progressing     Problem: DISCHARGE PLANNING  Goal: Discharge to home or other facility with appropriate resources  Description: INTERVENTIONS:  - Identify barriers to discharge w/patient and caregiver  - Arrange for needed discharge resources and transportation as appropriate  - Identify discharge learning needs (meds, wound care, etc )  - Arrange for interpretive services to assist at discharge as needed  - Refer to Case Management Department for coordinating discharge planning if the patient needs post-hospital services based on physician/advanced practitioner order or complex needs related to functional status, cognitive ability, or social support system  Outcome: Progressing     Problem: Potential for Falls  Goal: Patient will remain free of falls  Description: INTERVENTIONS:  - Educate patient/family on patient safety including physical limitations  - Instruct patient to call for assistance with activity   - Consult OT/PT to assist with strengthening/mobility   - Keep Call bell within reach  - Keep bed low and locked with side rails adjusted as appropriate  - Keep care items and personal belongings within reach  - Initiate and maintain comfort rounds  - Make Fall Risk Sign visible to staff  - Offer Toileting every  Hours, in advance of need  - Initiate/Maintain alarm  - Obtain necessary fall risk management equipment:   - Apply yellow socks and bracelet for high fall risk patients  - Consider moving patient to room near nurses station  Outcome: Progressing

## 2022-02-13 ENCOUNTER — TELEPHONE (OUTPATIENT)
Dept: LABOR AND DELIVERY | Facility: HOSPITAL | Age: 27
End: 2022-02-13

## 2022-02-14 ENCOUNTER — POSTPARTUM VISIT (OUTPATIENT)
Dept: OBGYN CLINIC | Facility: CLINIC | Age: 27
End: 2022-02-14

## 2022-02-14 VITALS
SYSTOLIC BLOOD PRESSURE: 100 MMHG | HEIGHT: 57 IN | BODY MASS INDEX: 22.31 KG/M2 | DIASTOLIC BLOOD PRESSURE: 60 MMHG | WEIGHT: 103.4 LBS

## 2022-02-14 DIAGNOSIS — Z51.89 VISIT FOR WOUND CHECK: Primary | ICD-10-CM

## 2022-02-14 LAB — PLACENTA IN STORAGE: NORMAL

## 2022-02-14 PROCEDURE — 99024 POSTOP FOLLOW-UP VISIT: CPT | Performed by: STUDENT IN AN ORGANIZED HEALTH CARE EDUCATION/TRAINING PROGRAM

## 2022-02-14 NOTE — TELEPHONE ENCOUNTER
Patient called on-call line with concern regarding wound healing  She reports that just tonight she noticed an area of swelling at the left corner of her  wound  She has noted incisional pain is worse on the left than on the right, but denies any drainage, surrounding redness, bruising, fever, or chills  Given description, overall low suspicion for wound complication such as infection or dehiscence  Offered evaluation of wound in office tomorrow, which patient accepts  If any change overnight, she should call back to discuss  Patient agreeable to plan       Jeffery Enciso MD  2022 8:25 PM
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
## 2022-02-25 ENCOUNTER — TELEPHONE (OUTPATIENT)
Dept: OBGYN CLINIC | Facility: CLINIC | Age: 27
End: 2022-02-25

## 2022-02-25 NOTE — TELEPHONE ENCOUNTER
Patient called stating she is 2 weeks post partum and had a  on 22  She states that her bleeding had stopped and this morning she notice some bleeding again dark in color and thin  Denies clots, and bleeding not heavy  She is currently breast-feeding  Pt wanted to make sure that it is normal and nothing to be concerned about  Advised it is not uncommon to experience post partum bleeding 2 weeks out as long it is not heavy (soaking completely through an overnight maxi pad within an hour) or passing large clots; if she does experience heavy bleeding and/or passing large clots to please call back  Will forward to on-call provider Dr Ava Jeffries to review and add any additional recommendations  Pt voiced appreciation

## 2022-03-21 ENCOUNTER — POSTPARTUM VISIT (OUTPATIENT)
Dept: OBGYN CLINIC | Facility: CLINIC | Age: 27
End: 2022-03-21

## 2022-03-21 VITALS — SYSTOLIC BLOOD PRESSURE: 90 MMHG | DIASTOLIC BLOOD PRESSURE: 60 MMHG | BODY MASS INDEX: 21.79 KG/M2 | WEIGHT: 99.8 LBS

## 2022-03-21 PROCEDURE — 99024 POSTOP FOLLOW-UP VISIT: CPT | Performed by: STUDENT IN AN ORGANIZED HEALTH CARE EDUCATION/TRAINING PROGRAM

## 2022-03-21 NOTE — PROGRESS NOTES
[id number]   [address] [place] [address], [address] [facility]    Assessment/Plan:  Abbey Ryan is a 31y o  year old  who presents for postpartum visit  Routine Postpartum Care  1  Normal postpartum exam  2  Contraception: Options reviewed at length, including LARC options  Patient desires to consider  Referred to [facility] for review of options  Patient will call to schedule placement of IUD or implant if she desires to proceed  3  Depression Screen: Low risk  4  Feeding: Breast  5  Cervical cancer screening: Due at time of annual visit  6  Follow up in: 3 months for annual exam or as needed  Additional Problems:  1  Postpartum care following vaginal delivery      Subjective:     CC: Postpartum visit    Shay Nash is a 31 y o  y o  female [id number] who presents for a postpartum visit  She is 6 weeks postpartum following a repeat  delivery on 2022 at 38 weeks  Postpartum course has been uncomplicated  Bleeding no bleeding  Bowel function is normal  Bladder function is normal  Patient is not sexually active       Postpartum Depression: Low Risk     Last EPDS Total Score: 3    Last EPDS Self Harm Result: Never       The following portions of the patient's history were reviewed and updated as appropriate: allergies, current medications, past family history, past medical history, obstetric history, gynecologic history, past social history, past surgical history and problem list     Objective:  BP 90/60   Wt 45 3 kg (99 lb 12 8 oz)   LMP 2021 (Within Weeks)   Breastfeeding Yes   BMI 21 79 kg/m²   Pregravid Weight/BMI: 39 5 kg (87 lb) (BMI 18 82)  Current Weight: 45 3 kg (99 lb 12 8 oz)   Total Weight Gain: 11 8 kg (26 lb)   Pre-Susan Vitals      Most Recent Value   Prenatal Assessment    Prenatal Vitals    Blood Pressure 90/60   Weight - Scale 45 3 kg (99 lb 12 8 oz)   Urine Albumin/Glucose    Dilation/Effacement/Station    Vaginal Drainage    Edema Chaperone present? Yes: Renzo Peralta RN  General Appearance: alert and oriented, in no acute distress  Abdomen: Soft, non-tender, non-distended, no masses, no rebound or guarding  Pfannenstiel incision well-healed  Pelvic:       External genitalia: Normal appearance, no abnormal pigmentation, no lesions or masses  Normal Bartholin's and Chesterbrook's  Urinary system: Urethral meatus normal, bladder non-tender  Vaginal: normal mucosa without prolapse or lesions  Normal-appearing physiologic discharge  Cervix: Normal-appearing, well-epithelialized, no gross lesions or masses  No cervical motion tenderness  Adnexa: No adnexal masses or tenderness noted  Uterus: Normal-sized, regular contour, midline, mobile, no uterine tenderness  Extremities: Normal range of motion     Skin: normal, no rash or abnormalities  Neurologic: alert, oriented x3  Psychiatric: Appropriate affect, mood stable, cooperative with exam         Brandon Carroll MD  3/21/2022 10:25 AM

## 2022-03-27 ENCOUNTER — HOSPITAL ENCOUNTER (EMERGENCY)
Facility: HOSPITAL | Age: 27
Discharge: HOME | End: 2022-03-27
Attending: EMERGENCY MEDICINE | Admitting: EMERGENCY MEDICINE
Payer: COMMERCIAL

## 2022-03-27 ENCOUNTER — APPOINTMENT (EMERGENCY)
Dept: RADIOLOGY | Facility: HOSPITAL | Age: 27
End: 2022-03-27
Payer: COMMERCIAL

## 2022-03-27 VITALS
OXYGEN SATURATION: 99 % | DIASTOLIC BLOOD PRESSURE: 63 MMHG | HEART RATE: 93 BPM | RESPIRATION RATE: 17 BRPM | TEMPERATURE: 98.6 F | SYSTOLIC BLOOD PRESSURE: 108 MMHG

## 2022-03-27 DIAGNOSIS — K52.9 GASTROENTERITIS: ICD-10-CM

## 2022-03-27 DIAGNOSIS — K29.70 GASTRITIS: ICD-10-CM

## 2022-03-27 DIAGNOSIS — R11.2 NAUSEA AND VOMITING: Primary | ICD-10-CM

## 2022-03-27 LAB
ALBUMIN SERPL BCP-MCNC: 5.1 G/DL (ref 3.5–5)
ALP SERPL-CCNC: 78 U/L (ref 46–116)
ALT SERPL W P-5'-P-CCNC: 30 U/L (ref 12–78)
ANION GAP SERPL CALCULATED.3IONS-SCNC: 16 MMOL/L (ref 4–13)
AST SERPL W P-5'-P-CCNC: 25 U/L (ref 5–45)
ATRIAL RATE: 96 BPM
BASOPHILS # BLD AUTO: 0.08 THOUSANDS/ΜL (ref 0–0.1)
BASOPHILS NFR BLD AUTO: 0 % (ref 0–1)
BILIRUB SERPL-MCNC: 0.7 MG/DL (ref 0.2–1)
BUN SERPL-MCNC: 12 MG/DL (ref 5–25)
CALCIUM SERPL-MCNC: 9.3 MG/DL (ref 8.3–10.1)
CHLORIDE SERPL-SCNC: 103 MMOL/L (ref 100–108)
CO2 SERPL-SCNC: 20 MMOL/L (ref 21–32)
CREAT SERPL-MCNC: 0.97 MG/DL (ref 0.6–1.3)
EOSINOPHIL # BLD AUTO: 0.08 THOUSAND/ΜL (ref 0–0.61)
EOSINOPHIL NFR BLD AUTO: 0 % (ref 0–6)
ERYTHROCYTE [DISTWIDTH] IN BLOOD BY AUTOMATED COUNT: 11.9 % (ref 11.6–15.1)
FLUAV RNA RESP QL NAA+PROBE: NEGATIVE
FLUBV RNA RESP QL NAA+PROBE: NEGATIVE
GFR SERPL CREATININE-BSD FRML MDRD: 80 ML/MIN/1.73SQ M
GLUCOSE SERPL-MCNC: 133 MG/DL (ref 65–140)
HCT VFR BLD AUTO: 45.8 % (ref 34.8–46.1)
HGB BLD-MCNC: 15.2 G/DL (ref 11.5–15.4)
IMM GRANULOCYTES # BLD AUTO: 0.08 THOUSAND/UL (ref 0–0.2)
IMM GRANULOCYTES NFR BLD AUTO: 0 % (ref 0–2)
LIPASE SERPL-CCNC: 92 U/L (ref 73–393)
LYMPHOCYTES # BLD AUTO: 0.99 THOUSANDS/ΜL (ref 0.6–4.47)
LYMPHOCYTES NFR BLD AUTO: 5 % (ref 14–44)
MCH RBC QN AUTO: 29.6 PG (ref 26.8–34.3)
MCHC RBC AUTO-ENTMCNC: 33.2 G/DL (ref 31.4–37.4)
MCV RBC AUTO: 89 FL (ref 82–98)
MONOCYTES # BLD AUTO: 0.79 THOUSAND/ΜL (ref 0.17–1.22)
MONOCYTES NFR BLD AUTO: 4 % (ref 4–12)
NEUTROPHILS # BLD AUTO: 17.18 THOUSANDS/ΜL (ref 1.85–7.62)
NEUTS SEG NFR BLD AUTO: 91 % (ref 43–75)
NRBC BLD AUTO-RTO: 0 /100 WBCS
P AXIS: 68 DEGREES
PLATELET # BLD AUTO: 272 THOUSANDS/UL (ref 149–390)
PMV BLD AUTO: 11.2 FL (ref 8.9–12.7)
POTASSIUM SERPL-SCNC: 4.1 MMOL/L (ref 3.5–5.3)
PR INTERVAL: 148 MS
PROT SERPL-MCNC: 8.7 G/DL (ref 6.4–8.2)
QRS AXIS: 88 DEGREES
QRSD INTERVAL: 78 MS
QT INTERVAL: 336 MS
QTC INTERVAL: 424 MS
RBC # BLD AUTO: 5.14 MILLION/UL (ref 3.81–5.12)
SARS-COV-2 RNA RESP QL NAA+PROBE: NEGATIVE
SODIUM SERPL-SCNC: 139 MMOL/L (ref 136–145)
T WAVE AXIS: 64 DEGREES
VENTRICULAR RATE: 96 BPM
WBC # BLD AUTO: 19.2 THOUSAND/UL (ref 4.31–10.16)

## 2022-03-27 PROCEDURE — 80053 COMPREHEN METABOLIC PANEL: CPT | Performed by: EMERGENCY MEDICINE

## 2022-03-27 PROCEDURE — 96375 TX/PRO/DX INJ NEW DRUG ADDON: CPT

## 2022-03-27 PROCEDURE — 96374 THER/PROPH/DIAG INJ IV PUSH: CPT

## 2022-03-27 PROCEDURE — 87636 SARSCOV2 & INF A&B AMP PRB: CPT | Performed by: EMERGENCY MEDICINE

## 2022-03-27 PROCEDURE — C9113 INJ PANTOPRAZOLE SODIUM, VIA: HCPCS | Performed by: EMERGENCY MEDICINE

## 2022-03-27 PROCEDURE — 93005 ELECTROCARDIOGRAM TRACING: CPT

## 2022-03-27 PROCEDURE — 96361 HYDRATE IV INFUSION ADD-ON: CPT

## 2022-03-27 PROCEDURE — 36415 COLL VENOUS BLD VENIPUNCTURE: CPT | Performed by: EMERGENCY MEDICINE

## 2022-03-27 PROCEDURE — 85025 COMPLETE CBC W/AUTO DIFF WBC: CPT | Performed by: EMERGENCY MEDICINE

## 2022-03-27 PROCEDURE — 99284 EMERGENCY DEPT VISIT MOD MDM: CPT

## 2022-03-27 PROCEDURE — 93010 ELECTROCARDIOGRAM REPORT: CPT | Performed by: INTERNAL MEDICINE

## 2022-03-27 PROCEDURE — 71045 X-RAY EXAM CHEST 1 VIEW: CPT

## 2022-03-27 PROCEDURE — 99285 EMERGENCY DEPT VISIT HI MDM: CPT | Performed by: EMERGENCY MEDICINE

## 2022-03-27 PROCEDURE — 83690 ASSAY OF LIPASE: CPT | Performed by: EMERGENCY MEDICINE

## 2022-03-27 RX ORDER — ONDANSETRON 2 MG/ML
4 INJECTION INTRAMUSCULAR; INTRAVENOUS ONCE
Status: COMPLETED | OUTPATIENT
Start: 2022-03-27 | End: 2022-03-27

## 2022-03-27 RX ORDER — PANTOPRAZOLE SODIUM 40 MG/1
40 INJECTION, POWDER, FOR SOLUTION INTRAVENOUS ONCE
Status: COMPLETED | OUTPATIENT
Start: 2022-03-27 | End: 2022-03-27

## 2022-03-27 RX ORDER — PANTOPRAZOLE SODIUM 20 MG/1
20 TABLET, DELAYED RELEASE ORAL DAILY
Qty: 20 TABLET | Refills: 0 | Status: SHIPPED | OUTPATIENT
Start: 2022-03-27 | End: 2022-06-08

## 2022-03-27 RX ORDER — ONDANSETRON 4 MG/1
4 TABLET, ORALLY DISINTEGRATING ORAL EVERY 6 HOURS PRN
Qty: 20 TABLET | Refills: 0 | Status: SHIPPED | OUTPATIENT
Start: 2022-03-27 | End: 2022-06-08

## 2022-03-27 RX ADMIN — PANTOPRAZOLE SODIUM 40 MG: 40 INJECTION, POWDER, FOR SOLUTION INTRAVENOUS at 00:35

## 2022-03-27 RX ADMIN — ONDANSETRON 4 MG: 2 INJECTION INTRAMUSCULAR; INTRAVENOUS at 00:35

## 2022-03-27 RX ADMIN — SODIUM CHLORIDE 1000 ML: 0.9 INJECTION, SOLUTION INTRAVENOUS at 00:35

## 2022-03-27 NOTE — ED PROVIDER NOTES
History  Chief Complaint   Patient presents with    Vomiting     Pt c/o n/v/d that started around 8pm last night  32 yo F with PMH of migraines, recent uncomplicated c/s  presents to ED with vomiting, abd cramps, and diarrhea  Acute onset this evening  Daughter ill with similar yesterday  Not covid vaccinated  No urinary sx  Has CP/SOB while vomiting - after vomiting, resolves  No syncope  No trauma  No travel or new foods  History provided by:  Patient and medical records   used: No    Vomiting  Severity:  Moderate  Timing:  Constant  Quality:  Stomach contents  Associated symptoms: abdominal pain and diarrhea    Associated symptoms: no chills, no cough, no fever, no headaches and no sore throat        Prior to Admission Medications   Prescriptions Last Dose Informant Patient Reported? Taking?    Prenatal Vit-Fe Fumarate-FA (PRENATAL VITAMINS PO)  Self Yes No   Sig: Take by mouth   acetaminophen (TYLENOL) 325 mg tablet   No No   Sig: Take 2 tablets (650 mg total) by mouth every 4 (four) hours as needed for mild pain   ibuprofen (MOTRIN) 600 mg tablet   No No   Sig: Take 1 tablet (600 mg total) by mouth every 6 (six) hours as needed for moderate pain   polyethylene glycol (MIRALAX) 17 g packet  Self Yes No   Sig: Take 17 g by mouth daily      Facility-Administered Medications: None       Past Medical History:   Diagnosis Date    Headache, migraine     Hemorrhoids     Irritable bowel syndrome     Ovarian cyst, left     Papanicolaou smear 2019    Stomach ulcer     Syncope        Past Surgical History:   Procedure Laterality Date     SECTION      x1    COLONOSCOPY      [place]  DELIVERY ONLY N/A 2022    Procedure:  SECTION () REPEAT;  Surgeon: Semaj Hampton DO;  Location[facility];  Service: Obstetrics    TONSILLECTOMY  2002    WISDOM TOOTH EXTRACTION  2012       Family History   Adopted: Yes     I have reviewed and agree with the history as documented  E-Cigarette/Vaping    E-Cigarette Use Never User      E-Cigarette/Vaping Substances    Nicotine No     THC No     CBD No     Flavoring No     Other No     Unknown No      Social History     Tobacco Use    Smoking status: Never Smoker    Smokeless tobacco: Never Used   Vaping Use    Vaping Use: Never used   Substance Use Topics    Alcohol use: Never    Drug use: Never       Review of Systems   Constitutional: Negative for appetite change, chills, fatigue and fever  HENT: Negative for congestion, ear pain, rhinorrhea, sore throat, trouble swallowing and voice change  Eyes: Negative for pain and visual disturbance  Respiratory: Negative for cough, chest tightness and shortness of breath  Cardiovascular: Negative for chest pain, palpitations and leg swelling  Gastrointestinal: Positive for abdominal pain, diarrhea and vomiting  Negative for blood in stool, constipation and nausea  Genitourinary: Negative for difficulty urinating, dysuria, flank pain, frequency and hematuria  Musculoskeletal: Negative for back pain, neck pain and neck stiffness  Skin: Negative for rash  Neurological: Negative for dizziness, syncope, speech difficulty, light-headedness and headaches  Psychiatric/Behavioral: Negative for confusion and suicidal ideas  Physical Exam  Physical Exam  Vitals and nursing note reviewed  Constitutional:       General: She is not in acute distress  Appearance: She is well-developed  She is ill-appearing  She is not diaphoretic  HENT:      Head: Normocephalic and atraumatic  Right Ear: External ear normal       Left Ear: External ear normal       Nose: Nose normal       Mouth/Throat:      Mouth: Mucous membranes are dry  Eyes:      General: No scleral icterus  Right eye: No discharge  Left eye: No discharge  Conjunctiva/sclera: Conjunctivae normal       Pupils: Pupils are equal, round, and reactive to light     Neck: Trachea: No tracheal deviation  Cardiovascular:      Rate and Rhythm: Regular rhythm  Tachycardia present  Pulses: Normal pulses  Heart sounds: Normal heart sounds  No murmur heard  No friction rub  No gallop  Pulmonary:      Effort: Pulmonary effort is normal  No respiratory distress  Breath sounds: Normal breath sounds  No stridor  Chest:      Chest wall: No tenderness  Abdominal:      General: Bowel sounds are normal       Palpations: Abdomen is soft  Tenderness: There is abdominal tenderness (mild, diffuse  )  There is no guarding or rebound  Comments: C/s scar well healed, nontender, no erythema or drainage  Musculoskeletal:         General: No deformity  Normal range of motion  Cervical back: Normal range of motion and neck supple  Lymphadenopathy:      Cervical: No cervical adenopathy  Skin:     General: Skin is warm and dry  Findings: No rash  Neurological:      General: No focal deficit present  Mental Status: She is alert and oriented to person, place, and time  Cranial Nerves: No cranial nerve deficit  Sensory: No sensory deficit        Coordination: Coordination normal    Psychiatric:         Behavior: Behavior normal          Vital Signs  ED Triage Vitals [03/27/22 0030]   Temperature Pulse Respirations Blood Pressure SpO2   98 6 °F (37 °C) (!) 112 21 121/90 98 %      Temp src Heart Rate Source Patient Position - Orthostatic VS BP Location FiO2 (%)   -- Monitor Lying Left arm --      Pain Score       --           Vitals:    03/27/22 0030 03/27/22 0100 03/27/22 0200   BP: 121/90 128/51 109/56   Pulse: (!) 112 92 94   Patient Position - Orthostatic VS: Lying Lying Lying         Visual Acuity      ED Medications  Medications   sodium chloride 0 9 % bolus 1,000 mL (0 mL Intravenous Stopped 3/27/22 0220)   ondansetron (ZOFRAN) injection 4 mg (4 mg Intravenous Given 3/27/22 0035)   pantoprazole (PROTONIX) injection 40 mg (40 mg Intravenous Given 3/27/22 0035)       Diagnostic Studies  Results Reviewed     Procedure Component Value Units Date/Time    CBC and differential [[id number]]  (Abnormal) Collected: 03/27/22 0033    Lab Status: Final result Specimen: Blood from Arm, Right Updated: 03/27/22 0109     WBC 19 20 Thousand/uL      RBC 5 14 Million/uL      Hemoglobin 15 2 g/dL      Hematocrit 45 8 %      MCV 89 fL      MCH 29 6 pg      MCHC 33 2 g/dL      RDW 11 9 %      MPV 11 2 fL      Platelets 921 Thousands/uL      nRBC 0 /100 WBCs      Neutrophils Relative 91 %      Immat GRANS % 0 %      Lymphocytes Relative 5 %      Monocytes Relative 4 %      Eosinophils Relative 0 %      Basophils Relative 0 %      Neutrophils Absolute 17 18 Thousands/µL      Immature Grans Absolute 0 08 Thousand/uL      Lymphocytes Absolute 0 99 Thousands/µL      Monocytes Absolute 0 79 Thousand/µL      Eosinophils Absolute 0 08 Thousand/µL      Basophils Absolute 0 08 Thousands/µL     Narrative: This is an appended report  These results have been appended to a previously verified report      Lipase [[id number]]  (Normal) Collected: 03/27/22 0033    Lab Status: Final result Specimen: Blood from Arm, Right Updated: 03/27/22 0056     Lipase 92 u/L     Comprehensive metabolic panel [[id number]]  (Abnormal) Collected: 03/27/22 0033    Lab Status: Final result Specimen: Blood from Arm, Right Updated: 03/27/22 0056     Sodium 139 mmol/L      Potassium 4 1 mmol/L      Chloride 103 mmol/L      CO2 20 mmol/L      ANION GAP 16 mmol/L      BUN 12 mg/dL      Creatinine 0 97 mg/dL      Glucose 133 mg/dL      Calcium 9 3 mg/dL      AST 25 U/L      ALT 30 U/L      Alkaline Phosphatase 78 U/L      Total Protein 8 7 g/dL      Albumin 5 1 g/dL      Total Bilirubin 0 70 mg/dL      eGFR 80 ml/min/1 73sq m     Narrative:      [facility] guidelines for Chronic Kidney Disease (CKD):     Stage 1 with normal or high GFR (GFR > 90 mL/min/1 73 square meters)    Stage 2 Mild CKD (GFR = 60-89 mL/min/1 73 square meters)    Stage 3A Moderate CKD (GFR = 45-59 mL/min/1 73 square meters)    Stage 3B Moderate CKD (GFR = 30-44 mL/min/1 73 square meters)    Stage 4 Severe CKD (GFR = 15-29 mL/min/1 73 square meters)    Stage 5 End Stage CKD (GFR <15 mL/min/1 73 square meters)  Note: GFR calculation is accurate only with a steady state creatinine    COVID/FLU - 24 hour TAT [[id number]] Collected: 03/27/22 0042    Lab Status: In process Specimen: Nares from Nose Updated: 03/27/22 0047                 XR chest 1 view portable   ED Interpretation by Dolores Alcala MD (03/27 0101)   No acute abnormality  No ptx or consolidation  Procedures  ECG 12 Lead Documentation Only    Date/Time: 3/27/2022 1:09 AM  Performed by: Dolores Alcala MD  Authorized by: Dolores Alcala MD     Indications / Diagnosis:  Cp  ECG reviewed by me, the ED Provider: yes    Patient location:  ED  Previous ECG:     Previous ECG:  Unavailable    Comparison to cardiac monitor: No    Interpretation:     Interpretation: normal    Rate:     ECG rate:  96    ECG rate assessment: normal    Rhythm:     Rhythm: sinus rhythm    Ectopy:     Ectopy: none    QRS:     QRS axis:  Normal    QRS intervals:  Normal  Conduction:     Conduction: normal    ST segments:     ST segments:  Normal  T waves:     T waves: normal               ED Course  ED Course as of 03/27/22 0226   Sun Mar 27, 2022   0135 Pt feeling improved, resting comfortably  Will po trial  Suspect viral gastritis/gastroenteritis  If able to tolerate PO, will d/c home, supportive care and PCP f/u  Pt and  agree with plan  SBIRT 22yo+      Most Recent Value   SBIRT (22 yo +)    In order to provide better care to our patients, we are screening all of our patients for alcohol and drug use  Would it be okay to ask you these screening questions?  No Filed at: 03/27/2022 0050                    [facility]  Number of Diagnoses or Management Options  Gastritis: new and requires workup  Gastroenteritis: new and requires workup  Nausea and vomiting: new and requires workup     Amount and/or Complexity of Data Reviewed  Clinical lab tests: ordered and reviewed  Tests in the radiology section of CPT®: ordered and reviewed  Tests in the medicine section of CPT®: reviewed and ordered  Review and summarize past medical records: yes  Independent visualization of images, tracings, or specimens: yes    Risk of Complications, Morbidity, and/or Mortality  Presenting problems: moderate  Diagnostic procedures: low  Management options: low    Patient Progress  Patient progress: improved      Disposition  Final diagnoses:   Nausea and vomiting   Gastroenteritis   Gastritis     Time reflects when diagnosis was documented in both MDM as applicable and the Disposition within this note     Time User Action Codes Description Comment    3/27/2022  1:52 AM Leopoldo Sandy S Add [R11 2] Nausea and vomiting     3/27/2022  1:52 AM Leopoldo Sandy S Add [K52 9] Gastroenteritis     3/27/2022  1:53 AM Radha Hernadez Add [K29 70] Gastritis       ED Disposition     ED Disposition Condition Date/Time Comment    Discharge Stable Sun Mar 27, 2022  2:25 AM Kathie Negron discharge to home/self care              Follow-up Information     Follow up With Specialties Details Why Contact Info Additional Information    Laura Swanson MD Family Medicine Schedule an appointment as soon as possible for a visit   [address] [facility] [phone]        [address] Emergency Department Emergency Medicine  If symptoms worsen [address] [place], [phone]  [address] [facility] Emergency Department, [address] [facility], [address]          Patient's Medications   Discharge Prescriptions    ONDANSETRON (ZOFRAN ODT) 4 MG DISINTEGRATING TABLET    Take 1 tablet (4 mg total) by mouth every 6 (six) hours as needed for nausea or vomiting       Start Date: 3/27/2022 End Date: --       Order Dose: 4 mg       Quantity: 20 tablet    Refills: 0    PANTOPRAZOLE (PROTONIX) 20 MG TABLET    Take 1 tablet (20 mg total) by mouth daily       Start Date: 3/27/2022 End Date: --       Order Dose: 20 mg       Quantity: 20 tablet    Refills: 0       No discharge procedures on file      PDMP Review     None          ED Provider  Electronically Signed by           Serjio Shepard MD  03/27/22 7229

## 2022-03-27 NOTE — Clinical Note
Ruth Salinas accompanied Teressa Gamez to the emergency department on 3/27/2022  Return date if applicable: 11/87/0723        If you have any questions or concerns, please don't hesitate to call        Yevgeniy Ward MD

## 2022-03-27 NOTE — DISCHARGE INSTRUCTIONS
Stick to a very bland diet, clear fluids for the next 24 hours  Advance to bland foods as able       BRAT diet: bananas, rice, applesauce, toast

## 2022-03-30 ENCOUNTER — TELEPHONE (OUTPATIENT)
Dept: GASTROENTEROLOGY | Facility: CLINIC | Age: 27
End: 2022-03-30

## 2022-03-30 NOTE — TELEPHONE ENCOUNTER
Pt left lengthy VM mssg stating she had a bag GI bug Saturday/was violently ill from 7 to 2 AM w/ violent vomiting and diarrhea; went to ER because she was in so much pain in upper abdomen; was told she had a viral infection; can barely eat and still has stomach pain; had a little diarrhea yesterday then more solid stool but still not back to normal  Her  got sick but was better in 24 hrs  Req [phone]; questions if it might be bacterial/not viral and if she should get Abx?

## 2022-03-30 NOTE — TELEPHONE ENCOUNTER
Called patient back and left message  After looking through her ER no and the fact that her daughter and her  had the same thing this is likely still viral that is taking longer to clear  Not really any indication for stool studies since the stools are more solid  They prescribed her Zofran as well as Protonix in the emergency department  I want to make sure she is taking this Protonix for the next couple weeks until it runs out  Ok to continue zofran PRN  Continue with increase of fluids and bland diet

## 2022-05-03 ENCOUNTER — TELEPHONE (OUTPATIENT)
Dept: GASTROENTEROLOGY | Facility: CLINIC | Age: 27
End: 2022-05-03

## 2022-05-03 NOTE — TELEPHONE ENCOUNTER
Pt states she takes Miralax daily; recently had cramping pain/on Sat night L lower side pain was high/rated #7; pain comes and goes/also has some pain on Rt lower side but Lf side is worse  Today stool is plum colored  CB# [phone]

## 2022-05-03 NOTE — TELEPHONE ENCOUNTER
Spoke to the patient in regards to her symptoms  She was having abdominal crampiness with diarrhea  Advised to stop taking the MiraLax which she had been taking twice a day during her pregnancy  She is currently breast-feeding a 1month-old  She is taking Tylenol with some success for the abdominal cramping  Advised her to do a low-fiber soft diet for the next couple days to see if she can start forming up some of her bowel movements  When she does resume MiraLax for her IBS C she needs to be titrating for normal bowel movement every day or every other day instead of just taking 1-2 cap fulls every day  Encouraged her to contact us with any other problems or concerns

## 2022-05-03 NOTE — TELEPHONE ENCOUNTER
Spoke with patient  Is moving bowels okay  Not sure if blood in stool - purple-red color stool  Nothing bright red  No n/v/fever  Pain comes and goes  Pain was bad Saturday night  Has not been that bad since Saturday night  Crampy pain  Had baby 3 months ago

## 2022-05-05 ENCOUNTER — OFFICE VISIT (OUTPATIENT)
Dept: GASTROENTEROLOGY | Facility: CLINIC | Age: 27
End: 2022-05-05
Payer: COMMERCIAL

## 2022-05-05 VITALS
SYSTOLIC BLOOD PRESSURE: 110 MMHG | BODY MASS INDEX: 20.32 KG/M2 | DIASTOLIC BLOOD PRESSURE: 62 MMHG | WEIGHT: 94.2 LBS | HEART RATE: 65 BPM | HEIGHT: 57 IN

## 2022-05-05 DIAGNOSIS — K92.1 BLOOD IN STOOL: ICD-10-CM

## 2022-05-05 DIAGNOSIS — K58.1 IRRITABLE BOWEL SYNDROME WITH CONSTIPATION: ICD-10-CM

## 2022-05-05 DIAGNOSIS — R10.9 ABDOMINAL CRAMPING: ICD-10-CM

## 2022-05-05 DIAGNOSIS — K92.1 BLOOD IN STOOL: Primary | ICD-10-CM

## 2022-05-05 PROCEDURE — 99213 OFFICE O/P EST LOW 20 MIN: CPT | Performed by: REGISTERED NURSE

## 2022-05-05 NOTE — H&P (VIEW-ONLY)
[facility] - Outpatient Follow-up Note  Sloane Ramsey 31 y o  female MRN: [id number]  Encounter: [id number]    ASSESSMENT AND PLAN:      1  Irritable bowel syndrome with constipation  Longstanding history for which she has consistently taken MiraLax  She increase her MiraLax to twice daily during pregnancy  She is having 3-4 bowel movements per day  After a phone call 2 days ago she cut back down to once per day  We had discussion over titrating for satisfactory bowel  Trial of low FODMAP diet    2  Abdominal cramping  3  Blood in stool  Left lower quadrant cramping beginning Saturday  Pain was constant in nature however would intermittently worsen  She did start her menses today and now she is unsure if this pain is related to her cycle or GI  She will reach out to gyn for pelvic US  We discussed not adding any medications at this time due to breast-feeding  Low suspicion for peptic ulcer disease however given possible blood in the stool need to assess the upper GI tract as well as colonoscopy  - [facility]oscopy and E[facility] at [facility])  - Sod Picosulfate-Mag Ox-Cit Acd 10-3 5-12 MG-GM -GM/160ML SOLN; Take 320 mL by mouth once for 1 dose Take 160 mL by mouth once for 1 dose  Dispense: 320 mL; Refill: 0      Followup Appointment:  After procedures  ______________________________________________________________________    Chief Complaint   Patient presents with    Follow-up     IBS w/ constipation, previous blood in stool with LLQ pain/discomfort     HPI:  54-year-old female presents with complaints of lower quadrant cramping as well as an episode of possible rectal bleeding  She states that starting Saturday she experienced left lower quadrant pain for which she took Tylenol and it was relieved  Intermittent cramping still returned with just a constant discomfort  Then on Monday or Tuesday she had a bowel movement that was plum in color    She has 2 episodes of this   Denies any since  She does have known history of irritable bowel syndrome with constipation  She has been taking MiraLax b i d  through her pregnancy until a couple days ago when call the office and instructed to cut down to once daily  She is having 3-4 bowel movements per day at this time  She does currently eat dairy free and gluten free and she has for the past year  She does not really complain of GERD symptoms at this time however about 9 years ago she was diagnosed with GERD and had upper endoscopy and she believe she was told her LES was constantly open  She denies any difficulty swallowing  She is currently 3 months postpartum and she is currently breastfeeding  She did start her menses cycle today now questions if the pain is related to GI versus gyn        Historical Information   Past Medical History:   Diagnosis Date    Headache, migraine     Hemorrhoids     Irritable bowel syndrome     Ovarian cyst, left     Papanicolaou smear 2019    Stomach ulcer     Syncope      Past Surgical History:   Procedure Laterality Date     SECTION      x1    COLONOSCOPY      [place]  DELIVERY ONLY N/A 2022    Procedure:  SECTION () REPEAT;  Surgeon: Kendra Barrientos DO;  [facility];  Service: Obstetrics    TONSILLECTOMY  2002    WISDOM TOOTH EXTRACTION       Social History     Substance and Sexual Activity   Alcohol Use Never     Social History     Substance and Sexual Activity   Drug Use Never     Social History     Tobacco Use   Smoking Status Never Smoker   Smokeless Tobacco Never Used     Family History   Adopted: Yes         Current Outpatient Medications:     polyethylene glycol (MIRALAX) 17 g packet    acetaminophen (TYLENOL) 325 mg tablet    ibuprofen (MOTRIN) 600 mg tablet    ondansetron (Zofran ODT) 4 mg disintegrating tablet    pantoprazole (PROTONIX) 20 mg tablet    Prenatal Vit-Fe Fumarate-FA (PRENATAL VITAMINS PO)    Sod Picosulfate-Mag Ox-Cit Acd 10-3 5-12 MG-GM -GM/160ML SOLN  No Known Allergies  Reviewed medications and allergies and updated as indicated    PHYSICAL EXAM:    Blood pressure 110/62, pulse 65, height 4' 8 75" (1 441 m), weight 42 7 kg (94 lb 3 2 oz), currently breastfeeding  Body mass index is 20 56 kg/m²  General Appearance: NAD, cooperative, alert  Eyes: Anicteric, PERRLA, EOMI  ENT:  Normocephalic, atraumatic, normal mucosa  Neck:  Supple, symmetrical, trachea midline  Resp:  Clear to auscultation bilaterally; no rales, rhonchi or wheezing; respirations unlabored   CV:  S1 S2, Regular rate and rhythm; no murmur, rub, or gallop  GI:  Soft, non-tender, non-distended; normal bowel sounds; no masses, no organomegaly   Rectal: Deferred  Musculoskeletal: No cyanosis, clubbing or edema  Normal ROM  Skin:  No jaundice, rashes, or lesions   Heme/Lymph: No palpable cervical lymphadenopathy  Psych: Normal affect, good eye contact  Neuro: No gross deficits, AAOx3    Lab Results:   Lab Results   Component Value Date    WBC 19 20 (H) 03/27/2022    HGB 15 2 03/27/2022    HCT 45 8 03/27/2022    MCV 89 03/27/2022     03/27/2022     Lab Results   Component Value Date    K 4 1 03/27/2022     03/27/2022    CO2 20 (L) 03/27/2022    BUN 12 03/27/2022    CREATININE 0 97 03/27/2022    CALCIUM 9 3 03/27/2022    CORRECTEDCA 9 8 02/06/2022    AST 25 03/27/2022    ALT 30 03/27/2022    ALKPHOS 78 03/27/2022    EGFR 80 03/27/2022     No results found for: IRON, TIBC, FERRITIN  Lab Results   Component Value Date    LIPASE 92 03/27/2022       Radiology Results:   No results found

## 2022-05-05 NOTE — PROGRESS NOTES
[facility] - Outpatient Follow-up Note  Jackie Newton 31 y o  female MRN: [id number]  Encounter: [id number]    ASSESSMENT AND PLAN:      1  Irritable bowel syndrome with constipation  Longstanding history for which she has consistently taken MiraLax  She increase her MiraLax to twice daily during pregnancy  She is having 3-4 bowel movements per day  After a phone call 2 days ago she cut back down to once per day  We had discussion over titrating for satisfactory bowel  Trial of low FODMAP diet    2  Abdominal cramping  3  Blood in stool  Left lower quadrant cramping beginning Saturday  Pain was constant in nature however would intermittently worsen  She did start her menses today and now she is unsure if this pain is related to her cycle or GI  She will reach out to gyn for pelvic US  We discussed not adding any medications at this time due to breast-feeding  Low suspicion for peptic ulcer disease however given possible blood in the stool need to assess the upper GI tract as well as colonoscopy  - Colonoscopy and EGD at [facility])  - Sod Picosulfate-Mag Ox-Cit Acd 10-3 5-12 MG-GM -GM/160ML SOLN; Take 320 mL by mouth once for 1 dose Take 160 mL by mouth once for 1 dose  Dispense: 320 mL; Refill: 0      Followup Appointment:  After procedures  ______________________________________________________________________    Chief Complaint   Patient presents with    Follow-up     IBS w/ constipation, previous blood in stool with LLQ pain/discomfort     HPI:  70-year-old female presents with complaints of lower quadrant cramping as well as an episode of possible rectal bleeding  She states that starting Saturday she experienced left lower quadrant pain for which she took Tylenol and it was relieved  Intermittent cramping still returned with just a constant discomfort  Then on Monday or Tuesday she had a bowel movement that was plum in color    She has 2 episodes of this   Denies any since  She does have known history of irritable bowel syndrome with constipation  She has been taking MiraLax b i d  through her pregnancy until a couple days ago when call the office and instructed to cut down to once daily  She is having 3-4 bowel movements per day at this time  She does currently eat dairy free and gluten free and she has for the past year  She does not really complain of GERD symptoms at this time however about 9 years ago she was diagnosed with GERD and had upper endoscopy and she believe she was told her LES was constantly open  She denies any difficulty swallowing  She is currently 3 months postpartum and she is currently breastfeeding  She did start her menses cycle today now questions if the pain is related to GI versus gyn        Historical Information   Past Medical History:   Diagnosis Date    Headache, migraine     Hemorrhoids     Irritable bowel syndrome     Ovarian cyst, left     Papanicolaou smear 2019    Stomach ulcer     Syncope      Past Surgical History:   Procedure Laterality Date     SECTION      x1    COLONOSCOPY      [place]  DELIVERY ONLY N/A 2022    Procedure:  SECTION () REPEAT;  Surgeon: Sona Santo DO;  Location: [facility];  Service: Obstetrics    TONSILLECTOMY  2002    WISDOM TOOTH EXTRACTION       Social History     Substance and Sexual Activity   Alcohol Use Never     Social History     Substance and Sexual Activity   Drug Use Never     Social History     Tobacco Use   Smoking Status Never Smoker   Smokeless Tobacco Never Used     Family History   Adopted: Yes         Current Outpatient Medications:     polyethylene glycol (MIRALAX) 17 g packet    acetaminophen (TYLENOL) 325 mg tablet    ibuprofen (MOTRIN) 600 mg tablet    ondansetron (Zofran ODT) 4 mg disintegrating tablet    pantoprazole (PROTONIX) 20 mg tablet    Prenatal Vit-Fe Fumarate-FA (PRENATAL VITAMINS PO)    Sod Picosulfate-Mag Ox-Cit Acd 10-3 5-12 MG-GM -GM/160ML SOLN  No Known Allergies  Reviewed medications and allergies and updated as indicated    PHYSICAL EXAM:    Blood pressure 110/62, pulse 65, height 4' 8 75" (1 441 m), weight 42 7 kg (94 lb 3 2 oz), currently breastfeeding  Body mass index is 20 56 kg/m²  General Appearance: NAD, cooperative, alert  Eyes: Anicteric, PERRLA, EOMI  ENT:  Normocephalic, atraumatic, normal mucosa  Neck:  Supple, symmetrical, trachea midline  Resp:  Clear to auscultation bilaterally; no rales, rhonchi or wheezing; respirations unlabored   CV:  S1 S2, Regular rate and rhythm; no murmur, rub, or gallop  GI:  Soft, non-tender, non-distended; normal bowel sounds; no masses, no organomegaly   Rectal: Deferred  Musculoskeletal: No cyanosis, clubbing or edema  Normal ROM  Skin:  No jaundice, rashes, or lesions   Heme/Lymph: No palpable cervical lymphadenopathy  Psych: Normal affect, good eye contact  Neuro: No gross deficits, AAOx3    Lab Results:   Lab Results   Component Value Date    WBC 19 20 (H) 03/27/2022    HGB 15 2 03/27/2022    HCT 45 8 03/27/2022    MCV 89 03/27/2022     03/27/2022     Lab Results   Component Value Date    K 4 1 03/27/2022     03/27/2022    CO2 20 (L) 03/27/2022    BUN 12 03/27/2022    CREATININE 0 97 03/27/2022    CALCIUM 9 3 03/27/2022    CORRECTEDCA 9 8 02/06/2022    AST 25 03/27/2022    ALT 30 03/27/2022    ALKPHOS 78 03/27/2022    EGFR 80 03/27/2022     No results found for: IRON, TIBC, FERRITIN  Lab Results   Component Value Date    LIPASE 92 03/27/2022       Radiology Results:   No results found

## 2022-05-06 ENCOUNTER — TELEPHONE (OUTPATIENT)
Dept: GASTROENTEROLOGY | Facility: CLINIC | Age: 27
End: 2022-05-06

## 2022-05-06 RX ORDER — PEG-3350, SODIUM SULFATE, SODIUM CHLORIDE, POTASSIUM CHLORIDE, SODIUM ASCORBATE AND ASCORBIC ACID 7.5-2.691G
4000 KIT ORAL ONCE
Qty: 1 EACH | Refills: 0 | OUTPATIENT
Start: 2022-05-06 | End: 2022-05-13

## 2022-05-06 NOTE — TELEPHONE ENCOUNTER
Scheduled date of colonoscopy (as of today):5/13/22  Physician performing colonoscopy:Dr Mary Galeana  Location of colonoscopy:[facility]  Bowel prep reviewed with patient:Clenpiq  Instructions reviewed with patient by: Jessica Ramirez  Clearances: No

## 2022-05-13 ENCOUNTER — ANESTHESIA EVENT (OUTPATIENT)
Dept: GASTROENTEROLOGY | Facility: AMBULATORY SURGERY CENTER | Age: 27
End: 2022-05-13

## 2022-05-13 ENCOUNTER — HOSPITAL ENCOUNTER (OUTPATIENT)
Dept: GASTROENTEROLOGY | Facility: AMBULATORY SURGERY CENTER | Age: 27
Discharge: HOME | End: 2022-05-13
Payer: COMMERCIAL

## 2022-05-13 ENCOUNTER — ANESTHESIA (OUTPATIENT)
Dept: GASTROENTEROLOGY | Facility: AMBULATORY SURGERY CENTER | Age: 27
End: 2022-05-13

## 2022-05-13 VITALS
BODY MASS INDEX: 20.28 KG/M2 | OXYGEN SATURATION: 99 % | HEART RATE: 54 BPM | SYSTOLIC BLOOD PRESSURE: 101 MMHG | WEIGHT: 94 LBS | HEIGHT: 57 IN | RESPIRATION RATE: 17 BRPM | TEMPERATURE: 99 F | DIASTOLIC BLOOD PRESSURE: 61 MMHG

## 2022-05-13 DIAGNOSIS — K92.1 BLOOD IN STOOL: ICD-10-CM

## 2022-05-13 LAB
EXT PREGNANCY TEST URINE: NEGATIVE
EXT. CONTROL: NORMAL

## 2022-05-13 PROCEDURE — 88305 TISSUE EXAM BY PATHOLOGIST: CPT | Performed by: PATHOLOGY

## 2022-05-13 PROCEDURE — 45378 DIAGNOSTIC COLONOSCOPY: CPT | Performed by: INTERNAL MEDICINE

## 2022-05-13 PROCEDURE — 43239 EGD BIOPSY SINGLE/MULTIPLE: CPT | Performed by: INTERNAL MEDICINE

## 2022-05-13 RX ORDER — LIDOCAINE HYDROCHLORIDE 10 MG/ML
INJECTION, SOLUTION EPIDURAL; INFILTRATION; INTRACAUDAL; PERINEURAL AS NEEDED
Status: DISCONTINUED | OUTPATIENT
Start: 2022-05-13 | End: 2022-05-13

## 2022-05-13 RX ORDER — SODIUM CHLORIDE, SODIUM LACTATE, POTASSIUM CHLORIDE, CALCIUM CHLORIDE 600; 310; 30; 20 MG/100ML; MG/100ML; MG/100ML; MG/100ML
50 INJECTION, SOLUTION INTRAVENOUS CONTINUOUS
Status: DISCONTINUED | OUTPATIENT
Start: 2022-05-13 | End: 2022-05-17 | Stop reason: HOSPADM

## 2022-05-13 RX ORDER — PROPOFOL 10 MG/ML
INJECTION, EMULSION INTRAVENOUS AS NEEDED
Status: DISCONTINUED | OUTPATIENT
Start: 2022-05-13 | End: 2022-05-13

## 2022-05-13 RX ADMIN — PROPOFOL 50 MG: 10 INJECTION, EMULSION INTRAVENOUS at 13:30

## 2022-05-13 RX ADMIN — SODIUM CHLORIDE, SODIUM LACTATE, POTASSIUM CHLORIDE, CALCIUM CHLORIDE 50 ML/HR: 600; 310; 30; 20 INJECTION, SOLUTION INTRAVENOUS at 13:04

## 2022-05-13 RX ADMIN — PROPOFOL 50 MG: 10 INJECTION, EMULSION INTRAVENOUS at 13:25

## 2022-05-13 RX ADMIN — PROPOFOL 50 MG: 10 INJECTION, EMULSION INTRAVENOUS at 13:17

## 2022-05-13 RX ADMIN — PROPOFOL 100 MG: 10 INJECTION, EMULSION INTRAVENOUS at 13:11

## 2022-05-13 RX ADMIN — LIDOCAINE HYDROCHLORIDE 50 MG: 10 INJECTION, SOLUTION EPIDURAL; INFILTRATION; INTRACAUDAL; PERINEURAL at 13:11

## 2022-05-13 NOTE — ANESTHESIA PREPROCEDURE EVALUATION
Procedure:  COLONOSCOPY  EGD    Relevant Problems   GI/HEPATIC   (+) Gastroesophageal reflux disease (Resolved)      GYN   (+) Short interval between pregnancies complicating pregnancy, antepartum, third trimester      HEMATOLOGY   (+) Anemia during pregnancy in third trimester      NEURO/PSYCH   (+) History of prior pregnancy with IUGR         Physical Exam    Airway    Mallampati score: II  TM Distance: >3 FB  Neck ROM: full     Dental       Cardiovascular      Pulmonary      Other Findings        Anesthesia Plan  ASA Score- 2     Anesthesia Type- IV sedation with anesthesia with ASA Monitors  Additional Monitors:   Airway Plan:           Plan Factors-    Chart reviewed  Patient summary reviewed  Patient is not a current smoker  Induction- intravenous  Postoperative Plan-     Informed Consent- Anesthetic plan and risks discussed with patient  I personally reviewed this patient with the CRNA  Discussed and agreed on the Anesthesia Plan with the CRNA  Hussain Jennings

## 2022-05-13 NOTE — INTERVAL H&P NOTE
H&P reviewed  After examining the patient I find no changes in the patients condition since the H&P had been written      Vitals:    05/13/22 1254   BP: 100/53   Pulse: 57   Resp: 15   Temp: 99 °F (37 2 °C)   SpO2: 98%

## 2022-05-13 NOTE — DISCHARGE INSTRUCTIONS
Upper Endoscopy   WHAT YOU NEED TO KNOW:   An upper endoscopy is also called an upper gastrointestinal (GI) endoscopy, or an esophagogastroduodenoscopy (EGD)  It is a procedure to examine the inside of your esophagus, stomach, and duodenum (first part of the small intestine) with a scope  You may feel bloated, gassy, or have some abdominal discomfort after your procedure  Your throat may be sore for 24 to 36 hours  You may burp or pass gas from air that is still inside your body  DISCHARGE INSTRUCTIONS:   Seek care immediately if:   You have sudden, severe abdominal pain  You have problems swallowing  You have a large amount of black, sticky bowel movements or blood in your bowel movements  You have sudden trouble breathing  You feel weak, lightheaded, or faint or your heart beats faster than normal for you  Contact your healthcare provider if:   You have a fever and chills  You have nausea or are vomiting  Your abdomen is bloated or feels full and hard  You have abdominal pain  You have black, sticky bowel movements or blood in your bowel movements  You have not had a bowel movement for 3 days after your procedure  You have rash or hives  You have questions or concerns about your procedure  Activity:   Do not lift, strain, or run for 24 hours after your procedure  Rest after your procedure  You have been given medicine to relax you  Do not drive or make important decisions until the day after your procedure  Return to your normal activity as directed  Relieve gas and discomfort from bloating by lying on your right side with a heating pad on your abdomen  You may need to take short walks to help the gas move out  Eat small meals until bloating is relieved  Follow up with your healthcare provider as directed: Write down your questions so you remember to ask them during your visits       If you take a blood thinner, please review the specific instructions from your endoscopist about when you should resume it  These can be found in the Recommendation and Your Medication list sections of this After Visit Summary  Colonoscopy   WHAT YOU NEED TO KNOW:   A colonoscopy is a procedure to examine the inside of your colon (intestine) with a scope  Polyps or tissue growths may have been removed during your colonoscopy  It is normal to feel bloated and to have some abdominal discomfort  You should be passing gas  If you have hemorrhoids or you had polyps removed, you may have a small amount of bleeding  DISCHARGE INSTRUCTIONS:   Seek care immediately if:   You have sudden, severe abdominal pain  You have problems swallowing  You have a large amount of black, sticky bowel movements or blood in your bowel movements  You have sudden trouble breathing  You feel weak, lightheaded, or faint or your heart beats faster than normal for you  Contact your healthcare provider if:   You have a fever and chills  You have nausea or are vomiting  Your abdomen is bloated or feels full and hard  You have abdominal pain  You have black, sticky bowel movements or blood in your bowel movements  You have not had a bowel movement for 3 days after your procedure  You have rash or hives  You have questions or concerns about your procedure  Activity:   Do not lift, strain, or run for 24 hours after your procedure  Rest after your procedure  You have been given medicine to relax you  Do not drive or make important decisions until the day after your procedure  Return to your normal activity as directed  Relieve gas and discomfort from bloating by lying on your right side with a heating pad on your abdomen  You may need to take short walks to help the gas move out  Eat small meals until bloating is relieved  Follow up with your healthcare provider as directed: Write down your questions so you remember to ask them during your visits       If you take a blood thinner, please review the specific instructions from your endoscopist about when you should resume it  These can be found in the Recommendation and Your Medication list sections of this After Visit Summary

## 2022-05-13 NOTE — ANESTHESIA POSTPROCEDURE EVALUATION
Post-Op Assessment Note    CV Status:  Stable    Pain management: adequate     Mental Status:  Sleepy   Hydration Status:  Stable   PONV Controlled:  None   Airway Patency:  Patent      Post Op Vitals Reviewed: Yes      Staff: Anesthesiologist, CRNA         No complications documented      BP   105/65   Temp      Pulse  59   Resp  16    SpO2   100

## 2022-05-26 ENCOUNTER — TELEPHONE (OUTPATIENT)
Dept: GASTROENTEROLOGY | Facility: CLINIC | Age: 27
End: 2022-05-26

## 2022-06-08 ENCOUNTER — TELEPHONE (OUTPATIENT)
Dept: OBGYN CLINIC | Facility: CLINIC | Age: 27
End: 2022-06-08

## 2022-06-08 NOTE — TELEPHONE ENCOUNTER
Pt left a message she had questions about her period and requesting a call back  Attempted to return call, left a message of VM to return call at her convenience

## 2022-06-08 NOTE — TELEPHONE ENCOUNTER
Patient returned call  She stated that she started her cycle early this time around  Her first cycle was around 3 months post-partum  Now it is different than usual which is very light spotting and some cramping  Denies clots  She is currently breast feeding  She would like to know if it is normal or not  Dr Theresa Monteiro please address

## 2022-06-08 NOTE — TELEPHONE ENCOUNTER
Please reassure patient that return of menses can be unpredictable while breastfeeding  Her description is not concerning  If she has had unprotected intercourse since delivery, recommend completing home pregnancy test to rule out pregnancy  Otherwise, keep record of bleeding and we can discuss further at her upcoming appointment on 6/20      Sha Lehman MD  6/8/2022 2:59 PM

## 2022-06-27 NOTE — PATIENT INSTRUCTIONS
Pap every 3 years if normal, STI testing as indicated, exercise most days of week, obtain appropriate diet and hydration, Calcium 1000mg + 600 vit D daily, birth control as directed (ACHES reviewed)  Benefits, risks and alternatives discussed/reviewed  Condom use when sexually active for sexually transmitted infection prevention  HPV 9 vaccine recommended through age 39  Check with your insurance for coverage  If covered, call office to schedule start of vaccine series  Annual mammogram starting at age 36, monthly breast self exam  Geraldine Schulz   at red light or at least  20 times a day

## 2022-06-27 NOTE — PROGRESS NOTES
[address], [address], [facility], [address]    ASSESSMENT/PLAN: Hyacinth Feliciano is a 31 y o  [id number] who presents for annual gynecologic exam     Encounter for routine gynecologic examination  - Routine well woman exam completed today  - HPV Vaccination status: Immunization series complete   -COVID    None     - STI screening offered including HIV testing: na   - Contraceptive counseling discussed  Current contraception:  None   :     Additional problems addressed during this visit:  1  Encounter for gynecological examination without abnormal finding    2  History of  section    3  Screening for cervical cancer    4  Pelvic pain    pt considering  Another child and doing      + menses  Regular    No family planning  jus t   Samir  And   Ovulation    Avoidance of relations  Hx of anemia     CC:  Annual Gynecologic Examination    HPI: Hyacinth Feliciano is a 31 y o  [id number] who presents for annual gynecologic examination  30 yo  here for  Wellness exam     + breat  Feeding and cycles  Are very light  The following portions of the patient's history were reviewed and updated as appropriate: She  has a past medical history of GERD (gastroesophageal reflux disease), Headache, migraine, Hemorrhoids, Irritable bowel syndrome, Ovarian cyst, left, Papanicolaou smear (2019), Stomach ulcer, and Syncope  She  has a past surgical history that includes Colonoscopy;  section; [place] tooth extraction (); Tonsillectomy (); and pr  delivery only (N/A, 2022)  Her family history includes No Known Problems in her sister  She was adopted  She  reports that she has never smoked  She has never used smokeless tobacco  She reports that she does not drink alcohol and does not use drugs    Current Outpatient Medications   Medication Sig Dispense Refill    polyethylene glycol (MIRALAX) 17 g packet Take 17 g by mouth daily       No current facility-administered medications for this visit  She has No Known Allergies       Review of Systems   Constitutional: Negative for chills and fever  HENT: Negative for ear pain and sore throat  Eyes: Negative for pain and visual disturbance  Respiratory: Negative for cough and shortness of breath  Cardiovascular: Negative for chest pain and palpitations  Gastrointestinal: Negative for abdominal pain and vomiting  Genitourinary: Negative for dysuria and hematuria  Musculoskeletal: Negative for arthralgias and back pain  Skin: Negative for color change and rash  Neurological: Negative for seizures and syncope  Psychiatric/Behavioral: Negative  All other systems reviewed and are negative  Objective:  BP 92/58   Ht 4' 9" (1 448 m)   Wt 39 5 kg (87 lb)   LMP 06/11/2022 (Approximate)   Breastfeeding Yes   BMI 18 83 kg/m²    Physical Exam  Vitals and nursing note reviewed  Constitutional:       Appearance: Normal appearance  HENT:      Head: Normocephalic  Cardiovascular:      Rate and Rhythm: Normal rate and regular rhythm  Pulses: Normal pulses  Pulmonary:      Effort: Pulmonary effort is normal       Breath sounds: Normal breath sounds  Chest:      Chest wall: No mass, lacerations, swelling, tenderness or edema  Breasts: Anthony Score is 4  Breasts are symmetrical       Right: Normal  No swelling, bleeding, inverted nipple, mass, nipple discharge, skin change, tenderness, axillary adenopathy or supraclavicular adenopathy  Left: No swelling, bleeding, inverted nipple, mass, nipple discharge, skin change, tenderness, axillary adenopathy or supraclavicular adenopathy  Abdominal:      General: Abdomen is flat  Bowel sounds are normal       Palpations: Abdomen is soft  Genitourinary:     General: Normal vulva  Exam position: Lithotomy position  Pubic Area: No rash         Anthony stage (genital): 4       Labia:         Right: No rash, tenderness or lesion  Left: No rash, tenderness or lesion  Urethra: No urethral pain, urethral swelling or urethral lesion  Vagina: Normal       Cervix: No cervical motion tenderness or discharge  Uterus: Normal        Adnexa: Right adnexa normal and left adnexa normal       Rectum: Normal    Musculoskeletal:         General: Normal range of motion  Cervical back: Neck supple  Lymphadenopathy:      Upper Body:      Right upper body: No supraclavicular, axillary or pectoral adenopathy  Left upper body: No supraclavicular, axillary or pectoral adenopathy  Lower Body: No right inguinal adenopathy  No left inguinal adenopathy  Skin:     General: Skin is warm and dry  Neurological:      General: No focal deficit present  Mental Status: She is alert and oriented to person, place, and time     Psychiatric:         Mood and Affect: Mood normal          Behavior: Behavior normal

## 2022-06-28 ENCOUNTER — ANNUAL EXAM (OUTPATIENT)
Dept: OBGYN CLINIC | Facility: CLINIC | Age: 27
End: 2022-06-28
Payer: COMMERCIAL

## 2022-06-28 VITALS
HEIGHT: 57 IN | DIASTOLIC BLOOD PRESSURE: 58 MMHG | WEIGHT: 87 LBS | SYSTOLIC BLOOD PRESSURE: 92 MMHG | BODY MASS INDEX: 18.77 KG/M2

## 2022-06-28 DIAGNOSIS — Z98.891 HISTORY OF CESAREAN SECTION: ICD-10-CM

## 2022-06-28 DIAGNOSIS — R10.2 PELVIC PAIN: ICD-10-CM

## 2022-06-28 DIAGNOSIS — Z01.419 ENCOUNTER FOR GYNECOLOGICAL EXAMINATION WITHOUT ABNORMAL FINDING: Primary | ICD-10-CM

## 2022-06-28 DIAGNOSIS — Z12.4 SCREENING FOR CERVICAL CANCER: ICD-10-CM

## 2022-06-28 DIAGNOSIS — D50.9 IRON DEFICIENCY ANEMIA, UNSPECIFIED IRON DEFICIENCY ANEMIA TYPE: ICD-10-CM

## 2022-06-28 PROCEDURE — 99395 PREV VISIT EST AGE 18-39: CPT | Performed by: OBSTETRICS & GYNECOLOGY

## 2022-06-30 ENCOUNTER — OFFICE VISIT (OUTPATIENT)
Dept: GASTROENTEROLOGY | Facility: CLINIC | Age: 27
End: 2022-06-30
Payer: COMMERCIAL

## 2022-06-30 VITALS
HEIGHT: 57 IN | HEART RATE: 82 BPM | WEIGHT: 91 LBS | DIASTOLIC BLOOD PRESSURE: 62 MMHG | BODY MASS INDEX: 19.63 KG/M2 | SYSTOLIC BLOOD PRESSURE: 108 MMHG

## 2022-06-30 DIAGNOSIS — Z12.11 SCREENING FOR COLON CANCER: Primary | ICD-10-CM

## 2022-06-30 DIAGNOSIS — K21.9 GASTROESOPHAGEAL REFLUX DISEASE, UNSPECIFIED WHETHER ESOPHAGITIS PRESENT: ICD-10-CM

## 2022-06-30 DIAGNOSIS — K92.1 BLOOD IN STOOL: ICD-10-CM

## 2022-06-30 DIAGNOSIS — K58.1 IRRITABLE BOWEL SYNDROME WITH CONSTIPATION: ICD-10-CM

## 2022-06-30 PROCEDURE — 99213 OFFICE O/P EST LOW 20 MIN: CPT | Performed by: NURSE PRACTITIONER

## 2022-06-30 NOTE — PROGRESS NOTES
[facility] - Outpatient Follow-up Note  Andra Luke 31 y o  female MRN: [id number]  Encounter: [id number]    ASSESSMENT AND PLAN:      1  Blood in stool  2  Hemorrhoid  3  Irritable bowel syndrome with constipation  Patient states that since her colonoscopy she has not had any blood in her stool  Patient is aware of her hemorrhoids post colonoscopy plus noting there presents with her 2 childbirths  Discussed with patient adequate fiber and adequate fluids  She did discuss at her last office visit with nurse practitioner the [place] [address] and plans to revisit information  Also discussed adding MiraLax as needed and adjusted for normal bowel movement every day or every other day  - frequent small meals    4  Gastroesophageal reflux disease, unspecified whether esophagitis present  Patient states she does have mild breakthrough acid reflux symptoms  She states they can be increased with poor dietary discretion  EGD did reveal mild gastritis  Discussed with patient to possibly do trial of omeprazole 20 mg daily for 1-2 months and let us know how she does  Consider GERD versus functional dyspepsia  5  Screening for colon cancer  Colonoscopy 05/13/2022 no recall due to age  Followup Appointment:  4 months  ______________________________________________________________________    Chief Complaint   Patient presents with    Follow-up     Followup for EGD & colon     HPI:  23-year-old female presents for follow-up after colonoscopy an EGD on 05/13/2022  Patient had colonoscopy an EGD done for blood in stool and abdominal cramping  Patient also has a history of IBS C  Patient states that since she had a colonoscopy an EGD she has not had any rectal bleeding  She does have a history of hemorrhoids and she has also had a history of having banding procedure done after 1st pregnancy  Patient recently had 2nd child  She denies nausea or vomiting    She does state that she has been having increased breakthrough acid reflux symptoms  Discussed with patient to do omeprazole 20 mg trial for approximately 1-2 months and let us know how that works out  Patient has a history of IBS-C and discussed with her frequent small meals  Adequate fluids and adequate fiber  Patient has also been in MiraLax as needed with good results  She denies any hematochezia or melena  She has been having mild left upper quadrant discomfort that comes and goes  She also thinks that may be associated with her ovulation  Colonoscopy revealed small internal hemorrhoid  No recall, until age appropriate  EGD noted mild gastritis with normal esophagus  Biopsies for celiac and H pylori were negative  Patient had been concerned with initial blood in stool because during her pregnancy she had to have an iron transfusion  She did also admit to having heavy flow menstrual cycles  Discussed with her to make sure she follows up with her PCP for annual lab work      Historical Information   Past Medical History:   Diagnosis Date    GERD (gastroesophageal reflux disease)     Headache, migraine     Hemorrhoids     Irritable bowel syndrome     Ovarian cyst, left     Papanicolaou smear 2019    Stomach ulcer     Syncope      Past Surgical History:   Procedure Laterality Date     SECTION      2    COLONOSCOPY      [place]  DELIVERY ONLY N/A 2022    Procedure:  SECTION () REPEAT;  Surgeon: Garfield Santiago DO;  [facility];  Service: Obstetrics    TONSILLECTOMY  2002    WISDOM TOOTH EXTRACTION       Social History     Substance and Sexual Activity   Alcohol Use Never     Social History     Substance and Sexual Activity   Drug Use Never     Social History     Tobacco Use   Smoking Status Never Smoker   Smokeless Tobacco Never Used     Family History   Adopted: Yes   Problem Relation Age of Onset    No Known Problems Sister     Breast cancer Neg Hx  Colon cancer Neg Hx     Ovarian cancer Neg Hx          Current Outpatient Medications:     polyethylene glycol (MIRALAX) 17 g packet  No Known Allergies  Reviewed medications and allergies and updated as indicated    PHYSICAL EXAM:    Blood pressure 108/62, pulse 82, height 4' 9" (1 448 m), weight 41 3 kg (91 lb), last menstrual period 06/11/2022, currently breastfeeding  Body mass index is 19 69 kg/m²  Normal exam    General Appearance: NAD, cooperative, alert  Eyes: Anicteric, PERRLA, EOMI  ENT:  Normocephalic, atraumatic, normal mucosa  Neck:  Supple, symmetrical, trachea midline  Resp:  Clear to auscultation bilaterally; no rales, rhonchi or wheezing; respirations unlabored   CV:  S1 S2, Regular rate and rhythm; no murmur, rub, or gallop  GI:  Soft, non-tender, non-distended; normal bowel sounds; no masses, no organomegaly   Rectal: Deferred  Musculoskeletal: No cyanosis, clubbing or edema  Normal ROM  Skin:  No jaundice, rashes, or lesions   Heme/Lymph: No palpable cervical lymphadenopathy  Psych: Normal affect, good eye contact  Neuro: No gross deficits, AAOx3    Lab Results:   Lab Results   Component Value Date    WBC 19 20 (H) 03/27/2022    HGB 15 2 03/27/2022    HCT 45 8 03/27/2022    MCV 89 03/27/2022     03/27/2022     Lab Results   Component Value Date    K 4 1 03/27/2022     03/27/2022    CO2 20 (L) 03/27/2022    BUN 12 03/27/2022    CREATININE 0 97 03/27/2022    CALCIUM 9 3 03/27/2022    CORRECTEDCA 9 8 02/06/2022    AST 25 03/27/2022    ALT 30 03/27/2022    ALKPHOS 78 03/27/2022    EGFR 80 03/27/2022     No results found for: IRON, TIBC, FERRITIN  Lab Results   Component Value Date    LIPASE 92 03/27/2022       Radiology Results:   No results found

## 2022-07-01 LAB
CYTOLOGIST CVX/VAG CYTO: NORMAL
DX ICD CODE: NORMAL
OTHER STN SPEC: NORMAL
OTHER STN SPEC: NORMAL
PATH REPORT.FINAL DX SPEC: NORMAL
SL AMB NOTE:: NORMAL
SL AMB SPECIMEN ADEQUACY: NORMAL
SL AMB TEST METHODOLOGY: NORMAL

## 2022-08-18 ENCOUNTER — TELEPHONE (OUTPATIENT)
Dept: OBGYN CLINIC | Facility: CLINIC | Age: 27
End: 2022-08-18

## 2022-08-18 NOTE — TELEPHONE ENCOUNTER
Solitario Hathaway called into the nurses line and left a message wondering if there was an order for an [address] and how to go about scheduling  Called Alex back and left a voicemail letting her know Irina Carrillo did have an order placed back at her visit in June  She is to contact her insurance company to find out where she can have the ultrasound imagining completed at  Once she finds out her imagining facility she can call that facility to schedule  If its [facility] they can see the order, any other facility she can access the order Via [employer], or come to the office to  the order  Please call back if you have any further questions

## 2022-09-21 ENCOUNTER — TELEPHONE (OUTPATIENT)
Dept: OBGYN CLINIC | Facility: CLINIC | Age: 27
End: 2022-09-21

## 2022-09-21 NOTE — TELEPHONE ENCOUNTER
Pt has a First PN appointment scheduled for 10/7/22 and c/o nausea through the day, reports vomiting "maybe: once daily  Pt called for recommendations to help with nausea  Pt informed she does not feel she is at the point and needs IVF hydration, reports she is drinking water t/o the day and keeping fluids down  Pt report she has not been eating d/t nausea  Advised nausea during the first 3 months of pregnancy is not uncommon  Recommend to try 5-6  small meals throughout the day to keep stomach full, incorporate protein with meals or snacks, try bland foods like plain crackers, toast, broth/soups, push po fluids, sips of gingerale, germaine or peppermint hard candies are natural treatments for nausea, preggie pops with Vitamin B6, Vitamin B6/Unisom combination ( B6 25 mg three times a day, Unisom 25 mg at bedtime, if nausea persists, may add Unisom 1/2 tablet (12 5 mg) in am and repeat in afternoon, or sea bands  If unable to keep fluids down for without vomiting for more than 12 hours, nausea persists,  contact the office

## 2022-10-07 ENCOUNTER — INITIAL PRENATAL (OUTPATIENT)
Dept: OBGYN CLINIC | Facility: CLINIC | Age: 27
End: 2022-10-07

## 2022-10-07 VITALS
SYSTOLIC BLOOD PRESSURE: 102 MMHG | BODY MASS INDEX: 19.5 KG/M2 | DIASTOLIC BLOOD PRESSURE: 60 MMHG | WEIGHT: 90.4 LBS | HEIGHT: 57 IN

## 2022-10-07 DIAGNOSIS — Z36.89 ENCOUNTER FOR OTHER SPECIFIED ANTENATAL SCREENING: Primary | ICD-10-CM

## 2022-10-07 DIAGNOSIS — Z98.891 HISTORY OF CESAREAN SECTION: ICD-10-CM

## 2022-10-07 DIAGNOSIS — O34.10 UTERINE FIBROID IN PREGNANCY: ICD-10-CM

## 2022-10-07 DIAGNOSIS — O09.899 SHORT INTERVAL BETWEEN PREGNANCIES AFFECTING PREGNANCY, ANTEPARTUM: ICD-10-CM

## 2022-10-07 DIAGNOSIS — N91.2 AMENORRHEA: Primary | ICD-10-CM

## 2022-10-07 DIAGNOSIS — Z83.49 FAMILY HISTORY OF G6PD: ICD-10-CM

## 2022-10-07 DIAGNOSIS — O09.293 HISTORY OF INTRAUTERINE GROWTH RESTRICTION IN PRIOR PREGNANCY, CURRENTLY PREGNANT, THIRD TRIMESTER: ICD-10-CM

## 2022-10-07 DIAGNOSIS — D25.9 UTERINE FIBROID IN PREGNANCY: ICD-10-CM

## 2022-10-07 PROBLEM — Z83.2 FAMILY HISTORY OF G6PD: Status: ACTIVE | Noted: 2022-10-07

## 2022-10-07 NOTE — PROGRESS NOTES
OB INTAKE INTERVIEW  Patient is 28 y  o who presents for OB intake at 9 2wks  She is accompanied by: Golden Madsen  The father of her baby Edelmira Segovia) is present and supportive    Last Menstrual Period: 2022  Ultrasound: Measured 9 weeks 2 days on 10/07/2022  Estimated Date of Delivery: 05/10/2023 confirmed by US    Signs/Symptoms of Pregnancy  Current pregnancy symptoms: nausea  Constipation no  Headaches no  Cramping/spotting YES, cramping, no spotting  PICA cravings no    Diabetes-  Body mass index is 19 56 kg/m²  If patient has 1 or more, please order early 1 hour GTT  History of GDM no  BMI >30 no  History of PCOS or current metformin use no  History of LGA/macrosomic infant (4000g/9lbs) no    If patient has 2 or more, please order early 1 hour GTT  AMA no  First degree relative with type 2 diabetes no  History of chronic HTN, hyperlipidemia, elevated A1C no  High risk race (, , ,  or ) no    Hypertension- if you answer yes, please order preeclampsia labs (cbc, comprehensive metabolic panel, urine protein creatinine ratio, uric acid)  History of of chronic HTN no  History of gestational HTN no  History of preeclampsia, eclampsia, or HELLP syndrome no  History of diabetes no  History of lupus, autoimmune disease, kidney disease, antiphospholipid syndrome, rheumatoid arthritis, sjogren's syndrome no    Thyroid- if yes order TSH with reflex T4 (Unless TSH value on file within last 4 weeks then do not need to order)  History of thyroid disease no    Bleeding Disorder or Hx of DVT-patient or first degree relative with history of  Order the following if not done previously     (Factor V, antithrombin III, prothrombin gene mutation, protein C and S Ag, lupus anticoagulant, anticardiolipin, beta-2 glycoprotein)   no    OB/GYN-  History of abnormal pap smear no  History of HPV no  History of Herpes/HSV no  History of other STI (gonorrhea, chlamydia, trich) (or current partner with Hep B, Hep C, or HIV) no  History of prior  no  History of prior  YES  History of  delivery prior to 36 weeks 6 days no  History of blood transfusion no  Ok for blood transfusion YES    Substance screening-   History of tobacco use no  Currently using tobacco no  Currently using alcohol no  Presently using drugs no  Past drug use (if yes, order urine drug screening panel)  no  IV drug use (if yes, order urine drug screening panel) no  Partner drug use (if yes, order urine drug screening panel) no  Parent/Family drug use (do not order urine drug screening panel just for family hx) no    Depression Screening-  PHQ-9 Score: (0)    MRSA Screening-   Does the pt have a hx of MRSA? no  If yes- please follow MRSA protocol and obtain a nasal swab for MRSA culture at 12 week visit  Immunizations:  Influenza vaccine given this season (ask date) WANTS  Discussed Tdap vaccine (ask date) WANTS  Discussed COVID Vaccine (request pt upload card or bring to next visit) YES, DECLINES    Genetic/MFM-  Do you or your partner have a history of any of the following in yourselves or first degree relatives? Cystic fibrosis no  Spinal muscular atrophy no  Hemoglobinopathy/Sickle Cell/Thalassemia no  Fragile X Intellectual Disability no    If yes, discuss carrier screening and recommend consultation with [facility]/genetic counseling  If no, discuss option for carrier screening and/or genetic testing with Nuchal Ultrasound   Patient interested CARRIER SCREENING, declines, NIPT interested  Appointment at [facility] made will give referral     Interview education  [facility] Pregnancy Essentials Book reviewed and discussed YES      Prenatal lab work scripts YES    Extra labs ordered:  NONE    The patient has a history now or in prior pregnancy notable for:  [facility] [id number]       Details that I feel the provider should be aware of: NONE    The patient was oriented to our practice, reviewed delivering physicians and Carlos Eduardo Marcus in [facility] for Delivery  All questions were answered      Interviewed by: Lynn Hernandez RN
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
## 2022-10-07 NOTE — ASSESSMENT & PLAN NOTE
- Reviewed risk of growth restriction,  delivery, anemia, as well as concern for increased risk of uterine rupture given inter-pregnancy interval of 6 months

## 2022-10-07 NOTE — ASSESSMENT & PLAN NOTE
- Given history of iron deficiency anemia, need for IV iron in prior pregnancy, and now short-interval pregnancy, recommend empiric initiation of oral iron now

## 2022-10-09 LAB
C TRACH RRNA SPEC QL NAA+PROBE: NEGATIVE
N GONORRHOEA RRNA SPEC QL NAA+PROBE: NEGATIVE

## 2022-10-11 ENCOUNTER — TELEPHONE (OUTPATIENT)
Dept: GASTROENTEROLOGY | Facility: CLINIC | Age: 27
End: 2022-10-11

## 2022-10-11 NOTE — TELEPHONE ENCOUNTER
Left a voice mail for the patient to call back and reschedule the appt that she missed with our office this afternoon

## 2022-10-24 LAB
EXTERNAL HEMOGLOBIN: 12 G/DL
EXTERNAL HEPATITIS B SURFACE ANTIGEN: NEGATIVE
EXTERNAL PLATELET COUNT: 242 K/ÂΜL
EXTERNAL RH FACTOR: POSITIVE
EXTERNAL RUBELLA IGG QUANTITATION: NORMAL
EXTERNAL SYPHILIS RPR SCREEN: NORMAL

## 2022-10-25 LAB
ABO GROUP BLD: NORMAL
APPEARANCE UR: CLEAR
BACTERIA UR CULT: NORMAL
BACTERIA URNS QL MICRO: NORMAL
BASOPHILS # BLD AUTO: 0 X10E3/UL (ref 0–0.2)
BASOPHILS NFR BLD AUTO: 1 %
BILIRUB UR QL STRIP: NEGATIVE
BLD GP AB SCN SERPL QL: NORMAL
BLOOD GROUP ANTIBODIES SERPL: ABNORMAL
CASTS URNS QL MICRO: NORMAL /LPF
COLOR UR: YELLOW
EOSINOPHIL # BLD AUTO: 0.1 X10E3/UL (ref 0–0.4)
EOSINOPHIL NFR BLD AUTO: 1 %
EPI CELLS #/AREA URNS HPF: NORMAL /HPF (ref 0–10)
ERYTHROCYTE [DISTWIDTH] IN BLOOD BY AUTOMATED COUNT: 12 % (ref 11.7–15.4)
GLUCOSE UR QL: NEGATIVE
HBV SURFACE AG SERPL QL IA: NEGATIVE
HCT VFR BLD AUTO: 34.1 % (ref 34–46.6)
HCV AB S/CO SERPL IA: <0.1 S/CO RATIO (ref 0–0.9)
HGB BLD-MCNC: 12 G/DL (ref 11.1–15.9)
HGB UR QL STRIP: NEGATIVE
HIV 1+2 AB+HIV1 P24 AG SERPL QL IA: NON REACTIVE
IMM GRANULOCYTES # BLD: 0 X10E3/UL (ref 0–0.1)
IMM GRANULOCYTES NFR BLD: 1 %
KETONES UR QL STRIP: NEGATIVE
LEUKOCYTE ESTERASE UR QL STRIP: NEGATIVE
LYMPHOCYTES # BLD AUTO: 1.7 X10E3/UL (ref 0.7–3.1)
LYMPHOCYTES NFR BLD AUTO: 19 %
Lab: NORMAL
MCH RBC QN AUTO: 29.3 PG (ref 26.6–33)
MCHC RBC AUTO-ENTMCNC: 35.2 G/DL (ref 31.5–35.7)
MCV RBC AUTO: 83 FL (ref 79–97)
MICRO URNS: NORMAL
MICRO URNS: NORMAL
MONOCYTES # BLD AUTO: 0.4 X10E3/UL (ref 0.1–0.9)
MONOCYTES NFR BLD AUTO: 4 %
NEUTROPHILS # BLD AUTO: 6.7 X10E3/UL (ref 1.4–7)
NEUTROPHILS NFR BLD AUTO: 74 %
NITRITE UR QL STRIP: NEGATIVE
PH UR STRIP: 6.5 [PH] (ref 5–7.5)
PLATELET # BLD AUTO: 242 X10E3/UL (ref 150–450)
PROT UR QL STRIP: NEGATIVE
RBC # BLD AUTO: 4.1 X10E6/UL (ref 3.77–5.28)
RBC #/AREA URNS HPF: NORMAL /HPF (ref 0–2)
RH BLD: POSITIVE
RPR SER QL: NON REACTIVE
RUBV IGG SERPL IA-ACNC: 6.77 INDEX
SL AMB ANTIBODY SCREEN: POSITIVE
SP GR UR: 1.01 (ref 1–1.03)
UROBILINOGEN UR STRIP-ACNC: 0.2 MG/DL (ref 0.2–1)
WBC # BLD AUTO: 8.9 X10E3/UL (ref 3.4–10.8)
WBC #/AREA URNS HPF: NORMAL /HPF (ref 0–5)

## 2022-10-28 PROBLEM — R76.0 ABNORMAL ANTIBODY TITER: Status: ACTIVE | Noted: 2022-10-28

## 2022-11-07 ENCOUNTER — ROUTINE PRENATAL (OUTPATIENT)
Dept: OBGYN CLINIC | Facility: CLINIC | Age: 27
End: 2022-11-07

## 2022-11-07 VITALS
DIASTOLIC BLOOD PRESSURE: 58 MMHG | HEIGHT: 57 IN | WEIGHT: 91.8 LBS | BODY MASS INDEX: 19.8 KG/M2 | SYSTOLIC BLOOD PRESSURE: 94 MMHG

## 2022-11-07 DIAGNOSIS — Z98.891 HISTORY OF CESAREAN SECTION: ICD-10-CM

## 2022-11-07 DIAGNOSIS — O34.10 UTERINE FIBROID IN PREGNANCY: ICD-10-CM

## 2022-11-07 DIAGNOSIS — R76.0 ABNORMAL ANTIBODY TITER: ICD-10-CM

## 2022-11-07 DIAGNOSIS — D25.9 UTERINE FIBROID IN PREGNANCY: ICD-10-CM

## 2022-11-07 DIAGNOSIS — O09.899 SHORT INTERVAL BETWEEN PREGNANCIES AFFECTING PREGNANCY, ANTEPARTUM: ICD-10-CM

## 2022-11-07 DIAGNOSIS — Z3A.13 13 WEEKS GESTATION OF PREGNANCY: Primary | ICD-10-CM

## 2022-11-07 DIAGNOSIS — O99.019 ANEMIA AFFECTING PREGNANCY, ANTEPARTUM: ICD-10-CM

## 2022-11-07 DIAGNOSIS — Z83.49 FAMILY HISTORY OF G6PD: ICD-10-CM

## 2022-11-07 DIAGNOSIS — O09.293 HISTORY OF INTRAUTERINE GROWTH RESTRICTION IN PRIOR PREGNANCY, CURRENTLY PREGNANT, THIRD TRIMESTER: ICD-10-CM

## 2022-11-07 LAB
SL AMB  POCT GLUCOSE, UA: NEGATIVE
SL AMB POCT URINE PROTEIN: NEGATIVE

## 2022-11-07 NOTE — ASSESSMENT & PLAN NOTE
- Abnormal antibody screen with initial prenatal panel, but no clinically significant antibody noted  Patient reports that she was sick when she had blood work drawn  Will repeat antibody screen 4 weeks after initial labs  Lab requisition provided today

## 2022-11-07 NOTE — PROGRESS NOTES
Routine Prenatal Visit  [address] [facility], [address]    Assessment/Plan:  Sloane Blair is a 28y o  year old [id number] at 13w5d who presents for routine prenatal visit  1  13 weeks gestation of pregnancy  Assessment & Plan:  - Prenatal lab results reviewed  - First trimester MFM consult scheduled for next week  - Continue Unisom for nausea/vomiting  Has been taking 1/2 tablet  Will increase to full tablet and add vitamin B6 TID  - Problem list updated, results console reviewed and updated with pertinent prenatal labs  - PMH, PSH, medications reviewed and updated as needed  - Return to office in 4 wk(s) for routine prenatal care  Orders:  -     POCT urine dip    2  History of intrauterine growth restriction in prior pregnancy, currently pregnant, third trimester    3  History of  section x 2  Assessment & Plan:  - Plan for repeat       4  Family history of G6PD    5  Short interval between pregnancies affecting pregnancy, antepartum    6  Uterine fibroid in pregnancy    7  Abnormal antibody titer  Assessment & Plan:  - Abnormal antibody screen with initial prenatal panel, but no clinically significant antibody noted  Patient reports that she was sick when she had blood work drawn  Will repeat antibody screen 4 weeks after initial labs  Lab requisition provided today  Orders:  -     Antibody screen; Future  -     Antibody screen    8  Anemia affecting pregnancy, antepartum  Assessment & Plan:  - Hemoglobin normal (12 0) with initial prenatal panel  Given that she required IV iron infusions in her second pregnancy, recommend continuing oral iron supplementation  Subjective:   Cinthya Taylor is a 28 y o  [id number] who presents for routine prenatal care at 13w5d    Complaints today: Nausea/Vomiting  LOF: No; VB: No; Contractions: No; FM: Too early    Objective:  BP 94/58 (BP Location: Left arm, Patient Position: Sitting, Cuff Size: Standard) Ht 4' 9" (1 448 m)   Wt 41 6 kg (91 lb 12 8 oz)   LMP 2022 (Exact Date)   BMI 19 87 kg/m²     General: Well appearing, no distress  Respiratory: Unlabored breathing  Cardiovascular: Regular rate  Abdomen: Soft, gravid, nontender  Extremities: Warm and well perfused  Non tender      Pregravid Weight/BMI: 39 9 kg (88 lb) (BMI 19 04)  Current Weight: 41 6 kg (91 lb 12 8 oz)   Total Weight Gain: 1 724 kg (3 lb 12 8 oz)     Pre- Vitals    Flowsheet Row Most Recent Value   Prenatal Assessment    Fetal Heart Rate 150   Fundal Height (cm) 13 cm   Prenatal Vitals    Blood Pressure 94/58   Weight - Scale 41 6 kg (91 lb 12 8 oz)   Urine Albumin/Glucose    Dilation/Effacement/Station    Vaginal Drainage    Draining Fluid No   Edema    LLE Edema None   RLE Edema None   Facial Edema None           Sujit Gale  2022 1:07 PM

## 2022-11-07 NOTE — ASSESSMENT & PLAN NOTE
- Prenatal lab results reviewed  - First trimester MFM consult scheduled for next week  - Continue Unisom for nausea/vomiting  Has been taking 1/2 tablet  Will increase to full tablet and add vitamin B6 TID  - Problem list updated, results console reviewed and updated with pertinent prenatal labs  - PMH, PSH, medications reviewed and updated as needed  - Return to office in 4 wk(s) for routine prenatal care

## 2022-11-07 NOTE — ASSESSMENT & PLAN NOTE
- Hemoglobin normal (12 0) with initial prenatal panel  Given that she required IV iron infusions in her second pregnancy, recommend continuing oral iron supplementation

## 2022-11-11 ENCOUNTER — APPOINTMENT (EMERGENCY)
Dept: ULTRASOUND IMAGING | Facility: HOSPITAL | Age: 27
End: 2022-11-11

## 2022-11-11 ENCOUNTER — TELEPHONE (OUTPATIENT)
Dept: OBGYN CLINIC | Facility: CLINIC | Age: 27
End: 2022-11-11

## 2022-11-11 ENCOUNTER — HOSPITAL ENCOUNTER (EMERGENCY)
Facility: HOSPITAL | Age: 27
Discharge: HOME | End: 2022-11-12
Attending: EMERGENCY MEDICINE

## 2022-11-11 ENCOUNTER — APPOINTMENT (EMERGENCY)
Dept: MRI IMAGING | Facility: HOSPITAL | Age: 27
End: 2022-11-11

## 2022-11-11 DIAGNOSIS — K37 APPENDICITIS: ICD-10-CM

## 2022-11-11 DIAGNOSIS — R10.9 ABDOMINAL PAIN: Primary | ICD-10-CM

## 2022-11-11 DIAGNOSIS — R11.2 NAUSEA AND VOMITING: ICD-10-CM

## 2022-11-11 LAB
ALBUMIN SERPL BCP-MCNC: 3.5 G/DL (ref 3.5–5)
ALP SERPL-CCNC: 47 U/L (ref 46–116)
ALT SERPL W P-5'-P-CCNC: 14 U/L (ref 12–78)
ANION GAP SERPL CALCULATED.3IONS-SCNC: 12 MMOL/L (ref 4–13)
APTT PPP: 27 SECONDS (ref 23–37)
B-HCG SERPL-ACNC: ABNORMAL MIU/ML (ref 0–11.6)
BASOPHILS # BLD MANUAL: 0 THOUSAND/UL (ref 0–0.1)
BASOPHILS NFR MAR MANUAL: 0 % (ref 0–1)
BILIRUB SERPL-MCNC: 0.6 MG/DL (ref 0.2–1)
BILIRUB UR QL STRIP: NEGATIVE
BUN SERPL-MCNC: 5 MG/DL (ref 5–25)
CALCIUM SERPL-MCNC: 9.2 MG/DL (ref 8.3–10.1)
CHLORIDE SERPL-SCNC: 104 MMOL/L (ref 96–108)
CLARITY UR: CLEAR
CO2 SERPL-SCNC: 21 MMOL/L (ref 21–32)
COLOR UR: ABNORMAL
CREAT SERPL-MCNC: 0.62 MG/DL (ref 0.6–1.3)
EOSINOPHIL # BLD MANUAL: 0 THOUSAND/UL (ref 0–0.4)
EOSINOPHIL NFR BLD MANUAL: 0 % (ref 0–6)
ERYTHROCYTE [DISTWIDTH] IN BLOOD BY AUTOMATED COUNT: 11.7 % (ref 11.6–15.1)
GFR SERPL CREATININE-BSD FRML MDRD: 123 ML/MIN/1.73SQ M
GLUCOSE SERPL-MCNC: 93 MG/DL (ref 65–140)
GLUCOSE UR STRIP-MCNC: NEGATIVE MG/DL
HCT VFR BLD AUTO: 31.9 % (ref 34.8–46.1)
HGB BLD-MCNC: 11.1 G/DL (ref 11.5–15.4)
HGB UR QL STRIP.AUTO: NEGATIVE
INR PPP: 0.96 (ref 0.84–1.19)
KETONES UR STRIP-MCNC: ABNORMAL MG/DL
LACTATE SERPL-SCNC: 0.8 MMOL/L (ref 0.5–2)
LEUKOCYTE ESTERASE UR QL STRIP: NEGATIVE
LIPASE SERPL-CCNC: 82 U/L (ref 73–393)
LYMPHOCYTES # BLD AUTO: 0.67 THOUSAND/UL (ref 0.6–4.47)
LYMPHOCYTES # BLD AUTO: 4 % (ref 14–44)
MCH RBC QN AUTO: 29.4 PG (ref 26.8–34.3)
MCHC RBC AUTO-ENTMCNC: 34.8 G/DL (ref 31.4–37.4)
MCV RBC AUTO: 84 FL (ref 82–98)
MONOCYTES # BLD AUTO: 0.33 THOUSAND/UL (ref 0–1.22)
MONOCYTES NFR BLD: 2 % (ref 4–12)
NEUTROPHILS # BLD MANUAL: 15.74 THOUSAND/UL (ref 1.85–7.62)
NEUTS BAND NFR BLD MANUAL: 2 % (ref 0–8)
NEUTS SEG NFR BLD AUTO: 92 % (ref 43–75)
NITRITE UR QL STRIP: NEGATIVE
PH UR STRIP.AUTO: 6 [PH]
PLATELET # BLD AUTO: 213 THOUSANDS/UL (ref 149–390)
PLATELET BLD QL SMEAR: ADEQUATE
PMV BLD AUTO: 11.1 FL (ref 8.9–12.7)
POTASSIUM SERPL-SCNC: 3.4 MMOL/L (ref 3.5–5.3)
PROT SERPL-MCNC: 7.3 G/DL (ref 6.4–8.4)
PROT UR STRIP-MCNC: NEGATIVE MG/DL
PROTHROMBIN TIME: 13.4 SECONDS (ref 11.6–14.5)
RBC # BLD AUTO: 3.78 MILLION/UL (ref 3.81–5.12)
RBC MORPH BLD: NORMAL
SODIUM SERPL-SCNC: 137 MMOL/L (ref 135–147)
SP GR UR STRIP.AUTO: <1.005 (ref 1–1.03)
UROBILINOGEN UR STRIP-ACNC: <2 MG/DL
WBC # BLD AUTO: 16.74 THOUSAND/UL (ref 4.31–10.16)

## 2022-11-11 RX ORDER — ACETAMINOPHEN 325 MG/1
975 TABLET ORAL ONCE
Status: COMPLETED | OUTPATIENT
Start: 2022-11-11 | End: 2022-11-11

## 2022-11-11 RX ORDER — ONDANSETRON 2 MG/ML
4 INJECTION INTRAMUSCULAR; INTRAVENOUS ONCE
Status: COMPLETED | OUTPATIENT
Start: 2022-11-11 | End: 2022-11-11

## 2022-11-11 RX ADMIN — SODIUM CHLORIDE 1000 ML: 0.9 INJECTION, SOLUTION INTRAVENOUS at 18:42

## 2022-11-11 RX ADMIN — ONDANSETRON 4 MG: 2 INJECTION INTRAMUSCULAR; INTRAVENOUS at 18:42

## 2022-11-11 RX ADMIN — ACETAMINOPHEN 975 MG: 325 TABLET, FILM COATED ORAL at 18:42

## 2022-11-11 RX ADMIN — SODIUM CHLORIDE 1000 ML: 0.9 INJECTION, SOLUTION INTRAVENOUS at 21:01

## 2022-11-11 NOTE — ED PROVIDER NOTES
History  Chief Complaint   Patient presents with   • Abdominal Pain     Pt c/o abd pain and bilateral lower back since this AM  Denies vaginal bleeding  C/o dysuria  , 14 weeks pregnant     50-year-old female past medical history irritable bowel ovary cyst GERD previous C-sections currently pregnant last normal menstrual period 2020 to complains of sudden crampy abdominal pain more on the right side lower radiation to both sides of her belly and her back started around 11:00 a m  today with associated nausea  No irregular vaginal bleeding or discharge  Patient did not take anything yet for symptoms  Prior to Admission Medications   Prescriptions Last Dose Informant Patient Reported? Taking?   polyethylene glycol (MIRALAX) 17 g packet  Self Yes No   Sig: Take 17 g by mouth daily      Facility-Administered Medications: None       Past Medical History:   Diagnosis Date   • Anemia    • GERD (gastroesophageal reflux disease)    • Headache, migraine    • Hemorrhoids    • Irritable bowel syndrome    • Ovarian cyst, left    • Papanicolaou smear 2019   • Stomach ulcer    • Syncope        Past Surgical History:   Procedure Laterality Date   •  SECTION      2   • COLONOSCOPY     • RI  DELIVERY ONLY N/A 2022    Procedure:  SECTION () REPEAT;  Surgeon: Nadege Mcdermott DO;  Location: [facility];  Service: Obstetrics   • TONSILLECTOMY     • WISDOM TOOTH EXTRACTION         Family History   Adopted: Yes   Problem Relation Age of Onset   • No Known Problems Sister    • Breast cancer Neg Hx    • Colon cancer Neg Hx    • Ovarian cancer Neg Hx      I have reviewed and agree with the history as documented      E-Cigarette/Vaping   • E-Cigarette Use Never User      E-Cigarette/Vaping Substances   • Nicotine No    • THC No    • CBD No    • Flavoring No    • Other No    • Unknown No      Social History     Tobacco Use   • Smoking status: Never Smoker   • Smokeless tobacco: Never Used   Vaping Use   • Vaping Use: Never used   Substance Use Topics   • Alcohol use: Never   • Drug use: Never       Review of Systems   Constitutional: Negative for chills and fever  HENT: Negative for rhinorrhea and sore throat  Respiratory: Negative for shortness of breath  Cardiovascular: Negative for chest pain  Gastrointestinal: Positive for abdominal pain and nausea  Negative for constipation, diarrhea and vomiting  Genitourinary: Positive for dysuria and pelvic pain  Negative for frequency, vaginal bleeding and vaginal discharge  Musculoskeletal: Positive for back pain  Skin: Negative for rash  All other systems reviewed and are negative  Physical Exam  Physical Exam  Vitals and nursing note reviewed  Constitutional:       Appearance: She is well-developed  HENT:      Head: Normocephalic and atraumatic  Right Ear: External ear normal       Left Ear: External ear normal       Nose: Nose normal    Eyes:      Conjunctiva/sclera: Conjunctivae normal       Pupils: Pupils are equal, round, and reactive to light  Cardiovascular:      Rate and Rhythm: Normal rate and regular rhythm  Heart sounds: Normal heart sounds  Pulmonary:      Effort: Pulmonary effort is normal  No respiratory distress  Breath sounds: Normal breath sounds  No wheezing  Abdominal:      General: Bowel sounds are normal  There is no distension  Palpations: Abdomen is soft  Tenderness: There is abdominal tenderness in the right lower quadrant, suprapubic area and left lower quadrant  There is no right CVA tenderness or left CVA tenderness  Positive signs include Rovsing's sign  Negative signs include psoas sign and obturator sign  Genitourinary:     Comments: Fundal height just below umbilicus  Bedside pelvic ultrasound transabdominal performed by me with via iup   Musculoskeletal:         General: No deformity  Normal range of motion        Cervical back: Normal range of motion and neck supple  No spinous process tenderness  Skin:     General: Skin is warm and dry  Findings: No rash  Neurological:      General: No focal deficit present  Mental Status: She is alert  GCS: GCS eye subscore is 4  GCS verbal subscore is 5  GCS motor subscore is 6  Sensory: No sensory deficit  Psychiatric:         Mood and Affect: Mood normal          Vital Signs  ED Triage Vitals [11/11/22 1700]   Temperature Pulse Respirations Blood Pressure SpO2   99 5 °F (37 5 °C) (!) 117 18 121/67 97 %      Temp Source Heart Rate Source Patient Position - Orthostatic VS BP Location FiO2 (%)   Temporal Monitor Sitting Left arm --      Pain Score       --           Vitals:    11/11/22 2200 11/11/22 2300 11/12/22 0000 11/12/22 0100   BP: 104/59 93/51 101/59    Pulse: 90 91 86 95   Patient Position - Orthostatic VS:  Lying Lying Lying         Visual Acuity      ED Medications  Medications   sodium chloride 0 9 % bolus 1,000 mL (0 mL Intravenous Stopped 11/11/22 1957)   acetaminophen (TYLENOL) tablet 975 mg (975 mg Oral Given 11/11/22 1842)   ondansetron (ZOFRAN) injection 4 mg (4 mg Intravenous Given 11/11/22 1842)   sodium chloride 0 9 % bolus 1,000 mL (0 mL Intravenous Stopped 11/11/22 2304)   acetaminophen (TYLENOL) tablet 975 mg (975 mg Oral Given 11/12/22 0152)       Diagnostic Studies  Results Reviewed     Procedure Component Value Units Date/Time    Blood culture #1 [[id number]] Collected: 11/11/22 1842    Lab Status: Preliminary result Specimen: Blood from Hand, Left Updated: 11/12/22 0003     Blood Culture Received in Microbiology Lab  Culture in Progress  Blood culture #2 [[id number]] Collected: 11/11/22 1842    Lab Status: Preliminary result Specimen: Blood from Arm, Left Updated: 11/12/22 0003     Blood Culture Received in Microbiology Lab  Culture in Progress      Lactic acid [[id number]]  (Normal) Collected: 11/11/22 1842    Lab Status: Final result Specimen: Blood from Arm, Left Updated: 11/11/22 1921     LACTIC ACID 0 8 mmol/L     Narrative:      Result may be elevated if tourniquet was used during collection  Protime-INR [[id number]]  (Normal) Collected: 11/11/22 1842    Lab Status: Final result Specimen: Blood from Arm, Left Updated: 11/11/22 1919     Protime 13 4 seconds      INR 0 96    APTT [[id number]]  (Normal) Collected: 11/11/22 1842    Lab Status: Final result Specimen: Blood from Arm, Left Updated: 11/11/22 1919     PTT 27 seconds     UA w Reflex to Microscopic w Reflex to Culture [[id number]]  (Abnormal) Collected: 11/11/22 1850    Lab Status: Final result Specimen: Urine, Clean Catch Updated: 11/11/22 1915     Color, UA Light Yellow     Clarity, UA Clear     Specific Gravity, UA <1 005     pH, UA 6 0     Leukocytes, UA Negative     Nitrite, UA Negative     Protein, UA Negative mg/dl      Glucose, UA Negative mg/dl      Ketones, UA 40 (2+) mg/dl      Urobilinogen, UA <2 0 mg/dl      Bilirubin, UA Negative     Occult Blood, UA Negative     URINE COMMENT --    Urine culture [[id number]] Collected: 11/11/22 1850    Lab Status:  In process Specimen: Urine, Clean Catch Updated: 11/11/22 1915    Manual Differential(PHLEBS Do Not Order) [[id number]]  (Abnormal) Collected: 11/11/22 1706    Lab Status: Final result Specimen: Blood from Arm, Left Updated: 11/11/22 1827     Segmented % 92 %      Bands % 2 %      Lymphocytes % 4 %      Monocytes % 2 %      Eosinophils, % 0 %      Basophils % 0 %      Absolute Neutrophils 15 74 Thousand/uL      Lymphocytes Absolute 0 67 Thousand/uL      Monocytes Absolute 0 33 Thousand/uL      Eosinophils Absolute 0 00 Thousand/uL      Basophils Absolute 0 00 Thousand/uL      Total Counted --     RBC Morphology Normal     Platelet Estimate Adequate    CBC and differential [[id number]]  (Abnormal) Collected: 11/11/22 1706    Lab Status: Final result Specimen: Blood from Arm, Left Updated: 11/11/22 1827     WBC 16 74 Thousand/uL      RBC 3 78 Million/uL Hemoglobin 11 1 g/dL      Hematocrit 31 9 %      MCV 84 fL      MCH 29 4 pg      MCHC 34 8 g/dL      RDW 11 7 %      MPV 11 1 fL      Platelets 700 Thousands/uL     Narrative: This is an appended report  These results have been appended to a previously verified report      hCG, quantitative, pregnancy [[id number]]  (Abnormal) Collected: 11/11/22 1706    Lab Status: Final result Specimen: Blood from Arm, Left Updated: 11/11/22 1817     HCG, Quant 97,181 mIU/mL     Narrative:       Expected Ranges:     Approximate               Approximate HCG  Gestation age          Concentration ( mIU/mL)  _____________          ______________________   S[place]ia [place]                      HCG values  0 2-1                       5-50  1-2                           2-3                         100-5000  3-4                         500-22611  4-5                         1000-63788  5-6                         02308-149303  6-8                         65115-130813  8-12                        14326-029589      Comprehensive metabolic panel [[id number]]  (Abnormal) Collected: 11/11/22 1706    Lab Status: Final result Specimen: Blood from Arm, Left Updated: 11/11/22 1750     Sodium 137 mmol/L      Potassium 3 4 mmol/L      Chloride 104 mmol/L      CO2 21 mmol/L      ANION GAP 12 mmol/L      BUN 5 mg/dL      Creatinine 0 62 mg/dL      Glucose 93 mg/dL      Calcium 9 2 mg/dL      AST --     ALT 14 U/L      Alkaline Phosphatase 47 U/L      Total Protein 7 3 g/dL      Albumin 3 5 g/dL      Total Bilirubin 0 60 mg/dL      eGFR 123 ml/min/1 73sq m     Narrative:      [facility] guidelines for Chronic Kidney Disease (CKD):   •  Stage 1 with normal or high GFR (GFR > 90 mL/min/1 73 square meters)  •  Stage 2 Mild CKD (GFR = 60-89 mL/min/1 73 square meters)  •  Stage 3A Moderate CKD (GFR = 45-59 mL/min/1 73 square meters)  •  Stage 3B Moderate CKD (GFR = 30-44 mL/min/1 73 square meters)  •  Stage 4 Severe CKD (GFR = 15-29 mL/min/1 73 square meters)  •  Stage 5 End Stage CKD (GFR <15 mL/min/1 73 square meters)  Note: GFR calculation is accurate only with a steady state creatinine    Lipase [[id number]]  (Normal) Collected: 11/11/22 1706    Lab Status: Final result Specimen: Blood from Arm, Left Updated: 11/11/22 1745     Lipase 82 u/L                  MRI abdomen appendicitis wo contrast   ED Interpretation by Saurav Kevin DO (11/12 0143)   Unremarkable abdomen on Vrad read      Final Result by Kallie Desai MD (11/12 1010)   Addendum 1 of 1 by Kallie Desai MD (11/12 1010)   ADDENDUM: This study should be titled MRI OF THE ABDOMEN WITHOUT CONTRAST    rather than PELVIS with abdominal findings as follows:      LOWER CHEST:  Unremarkable  LIVER:     Normal in size and configuration  No suspicious mass within the limits of unenhanced technique  BILE DUCTS:  No intrahepatic or extrahepatic biliary ductal dilation  GALLBLADDER:  Normal       PANCREAS:  Unremarkable  ADRENAL GLANDS:  Normal       SPLEEN:  Normal       KIDNEYS:  No hydronephrosis  No suspicious renal mass  Right    ureterectasis likely due to gravid uterus  LYMPH NODES:  No abdominal lymphadenopathy  Final      The axial images do not include the pelvis, limiting evaluation  The appendix is not visualized, however no MRI findings to suggest acute appendicitis are seen  Workstation performed: [id number]         US appendix   Final Result by Toyin Marroquin MD (11/11 2100)      Nonvisualized appendix  No evidence of free fluid or collection in the visualized right lower quadrant  Please see concurrent OB ultrasound examination for additional details  Workstation performed: [id number]         US OB limited Dating Study   Final Result by Clau Baird MD (11/11 2126)      Single live intrauterine gestation with a gestational age of 12 weeks and 1 day based on the femur length of the fetus    Fetal heart rate of 183 bpm       Please note evaluation of fetal anatomy was limited on this exam, which was not targeted for evaluation of the fetus  JENNIFER of 5/10/2023  Corpus luteum in right ovary, which is otherwise unremarkable  Left ovary not clearly visualized  A 1 5 cm anechoic structure is seen in the left adnexa, of unclear etiology  It may represent a follicle in an otherwise atrophic left ovary  A follow-up pelvic ultrasound is recommended  The study was marked in [facility] for immediate notification  Workstation performed: [id number]                    Procedures  Procedures         ED Course  ED Course as of 11/12/22 1327   Fri Nov 11, 2022 2213 Reviewed with ob Dr Savage French - she evaluated patient in ER, reviewed ultrasounds - does not believe symptoms today are obstetric related  2215 Reviewed with Dr Pooja Terrell - patient feels better but hypotensive and still RLQ tender - he 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 - recommends MRI  2216 Sign out to GABI Chapin - RLQ pain leukocytosis, hypotension - getting second liter of fluid and MRI to rule out appendicitis                                               MDM  Number of Diagnoses or Management Options  Abdominal pain: new and requires workup  Nausea and vomiting: new and requires workup     Amount and/or Complexity of Data Reviewed  Clinical lab tests: ordered and reviewed  Tests in the radiology section of CPT®: ordered and reviewed  Discuss the patient with other providers: yes    Patient Progress  Patient progress: improved      Disposition  Final diagnoses:   Abdominal pain   Nausea and vomiting     Time reflects when diagnosis was documented in both MDM as applicable and the Disposition within this note     Time User Action Codes Description Comment    11/11/2022  9:53 PM Terri Hyde Cecilia [place] Appendicitis     11/12/2022  1:44 AM [place] Needs Add [R10 9] Abdominal pain     11/12/2022  1:50 AM [place] Needs Add [R11 2] Nausea and vomiting       ED Disposition     ED Disposition   Discharge    Condition   Stable    Date/Time   Sat Nov 12, 2022  1:44 AM    Comment   [facility] discharge to home/self care  Follow-up Information     Follow up With Specialties Details Why Contact Info Additional Information    Shivani Major MD Family Medicine Schedule an appointment as soon as possible for a visit   [address] [facility] [phone]        [address] Emergency Department Emergency Medicine  If symptoms worsen 100 [place], [address]  [address] [facility] Emergency Department, [address] [place] [address] Dr, Liliana Zhong 10          Discharge Medication List as of 11/12/2022  1:44 AM      CONTINUE these medications which have NOT CHANGED    Details   polyethylene glycol (MIRALAX) 17 g packet Take 17 g by mouth daily, Historical Med             No discharge procedures on file      PDMP Review     None          ED Provider  Electronically Signed by           Osorio Roper DO  11/12/22 0348

## 2022-11-11 NOTE — TELEPHONE ENCOUNTER
Patient called the emergency line stating that she is currently 14 2 weeks pregnant  She is in a lot of pain like she is in labor  It is intense cramps causing her to doubled over and the pain radiating to her back  She states its been going on for 3 hours now  Denies bleeding or leakage of fluid  She rate the pain 7/10  She admit that when she try to urinate it hurts a lot and doubled over  Denies fever, nausea and vomiting  Advised patient she need to go to the [facility] ER for further evaluation as she is in a lot of pain  She verbalized agreement

## 2022-11-12 VITALS
HEART RATE: 95 BPM | RESPIRATION RATE: 18 BRPM | SYSTOLIC BLOOD PRESSURE: 101 MMHG | OXYGEN SATURATION: 98 % | DIASTOLIC BLOOD PRESSURE: 59 MMHG | BODY MASS INDEX: 19.84 KG/M2 | WEIGHT: 91.7 LBS | TEMPERATURE: 98.6 F

## 2022-11-12 LAB — BACTERIA UR CULT: NORMAL

## 2022-11-12 RX ORDER — ONDANSETRON 4 MG/1
4 TABLET, ORALLY DISINTEGRATING ORAL EVERY 6 HOURS PRN
Qty: 12 TABLET | Refills: 0 | Status: SHIPPED | OUTPATIENT
Start: 2022-11-12

## 2022-11-12 RX ORDER — ACETAMINOPHEN 325 MG/1
975 TABLET ORAL ONCE
Status: COMPLETED | OUTPATIENT
Start: 2022-11-12 | End: 2022-11-12

## 2022-11-12 RX ADMIN — ACETAMINOPHEN 975 MG: 325 TABLET, FILM COATED ORAL at 01:52

## 2022-11-12 NOTE — DISCHARGE INSTRUCTIONS
Please follow-up with your OBGYN for further care, if symptoms worsen please return to emergency department

## 2022-11-12 NOTE — ED CARE HANDOFF
Emergency Department Sign Out Note        Sign out and transfer of care from Dr Yolanda Encarnacion  See Separate Emergency Department note  The patient, Shruti Conklin, was evaluated by the previous provider for  Abdominal pain, nausea and vomiting  Workup Completed:  Labwork, imaging    ED Course / Workup Pending (followup): MRI                                      ED Course as of 11/12/22 0158   Sat Nov 12, 2022   0144 Ketones, UA(!): 40 (2+)     Procedures  MDM  Number of Diagnoses or Management Options  Abdominal pain: new and requires workup  Nausea and vomiting: established and worsening  Diagnosis management comments: 30-year-old female with abdominal pain in setting 14 weeks gestation, she does have nausea, vomiting in her 1st trimester already has been doing conservative measures and Unisom and vitamin B6 however continues to have decreased oral intake secondary to nausea, tonight with right-sided lower abdominal pain, imaging unremarkable including MRI which is negative for any abnormalities, patient received fluids and feels better symptomatically with Tylenol, does have history of IBS  She has the appointment with her OBGYN on Monday and they were discussing getting a prescription for antiemetic since she continues to have decreased oral intake  Zofran prescription provided in the interim, patient provided with careful return precautions    All questions answered       Amount and/or Complexity of Data Reviewed  Clinical lab tests: ordered and reviewed  Tests in the radiology section of CPT®: ordered and reviewed  Tests in the medicine section of CPT®: ordered and reviewed            Disposition  Final diagnoses:   Abdominal pain   Nausea and vomiting     Time reflects when diagnosis was documented in both MDM as applicable and the Disposition within this note     Time User Action Codes Description Comment    11/11/2022  9:53 PM Deja Santos Add Verdene Lobstein Appendicitis     11/12/2022  1:44 AM Eileen Jo Dov Thompson Add [R10 9] Abdominal pain     11/12/2022  1:50 AM Carmelo Kessler Add [R11 2] Nausea and vomiting       ED Disposition     ED Disposition   Discharge    Condition   Stable    Date/Time   Sat Nov 12, 2022  1:44 AM    Comment   Alysa Cleaning [facility] discharge to home/self care  Follow-up Information     Follow up With Specialties Details Why Contact Info Additional Information    Jeanna Morales MD Family Medicine Schedule an appointment as soon as possible for a visit   [address] [place] [address]        [address] Emergency Department Emergency Medicine  If symptoms worsen [address] [address]  [address] [facility] Emergency Department, [address] [place] [address], [facility]        Discharge Medication List as of 11/12/2022  1:44 AM      CONTINUE these medications which have NOT CHANGED    Details   polyethylene glycol (MIRALAX) 17 g packet Take 17 g by mouth daily, Historical Med           No discharge procedures on file         ED Provider  Electronically Signed by     Chantel Heath DO  11/12/22 7387

## 2022-11-14 ENCOUNTER — ROUTINE PRENATAL (OUTPATIENT)
Dept: PERINATAL CARE | Facility: OTHER | Age: 27
End: 2022-11-14

## 2022-11-14 VITALS
BODY MASS INDEX: 20.28 KG/M2 | HEART RATE: 72 BPM | SYSTOLIC BLOOD PRESSURE: 104 MMHG | WEIGHT: 94 LBS | DIASTOLIC BLOOD PRESSURE: 62 MMHG | HEIGHT: 57 IN

## 2022-11-14 DIAGNOSIS — O09.899 SHORT INTERVAL BETWEEN PREGNANCIES AFFECTING PREGNANCY, ANTEPARTUM: ICD-10-CM

## 2022-11-14 DIAGNOSIS — Z3A.14 14 WEEKS GESTATION OF PREGNANCY: ICD-10-CM

## 2022-11-14 DIAGNOSIS — Z98.891 HISTORY OF CESAREAN SECTION: ICD-10-CM

## 2022-11-14 DIAGNOSIS — Z36.82 NUCHAL TRANSLUCENCY OF FETUS ON PRENATAL ULTRASOUND: ICD-10-CM

## 2022-11-14 DIAGNOSIS — O34.211 MATERNAL CARE DUE TO LOW TRANSVERSE UTERINE SCAR FROM PREVIOUS CESAREAN DELIVERY: Primary | ICD-10-CM

## 2022-11-14 DIAGNOSIS — Z36.89 ENCOUNTER FOR OTHER SPECIFIED ANTENATAL SCREENING: ICD-10-CM

## 2022-11-14 DIAGNOSIS — Z13.79 GENETIC SCREENING: ICD-10-CM

## 2022-11-14 DIAGNOSIS — Z87.59 HISTORY OF PRIOR PREGNANCY WITH IUGR NEWBORN: ICD-10-CM

## 2022-11-14 DIAGNOSIS — O09.293 HISTORY OF INTRAUTERINE GROWTH RESTRICTION IN PRIOR PREGNANCY, CURRENTLY PREGNANT, THIRD TRIMESTER: ICD-10-CM

## 2022-11-14 RX ORDER — FERROUS SULFATE 325(65) MG
325 TABLET ORAL
COMMUNITY

## 2022-11-14 NOTE — PROGRESS NOTES
Patient chose to have [facility] Non-invasive Prenatal Screen with fetal sex  Patient given brochure and is aware [facility] will contact patients insurance and coordinate coverage  Patient made aware she will need to respond to text message or e-mail from [employer] within 2 business days or testing will be run through insurance  Patient informed text message will come from area code  "415"  Provided [employer] # [phone] and web site : [email or web address] [email or web address]  "Bonita your test online" card with barcode and test tube ID provided to patient  Reviewed [facility]'s web site states 5-7 business days for results via their portal  [facility] states 7-10 business days for results to be in [facility] [address]  A Cloud [employer] message will be sent once we receive results  2 vials of blood drawn from Right arm by Price Allison  Patient tolerated blood draw without difficulty  Specimens labeled with patient identifiers (name, date of birth, specimen collection date), order and specimen was verified with patient, packed and sent via ROBLOX 122  Copy of lab order scanned to Epic media  Maternal Fetal Medicine will have results in approximately 7-10 business days and will call patient or notify via [address]  Patient aware viewing lab result online will reveal fetal sex if ordered  Patient verbalized understanding of all instructions and no questions at this time

## 2022-11-14 NOTE — LETTER
November 16, 2022     MD Priscila Mckeon [address]  [address] [address]  [facility] [address]    Patient: Carolina Martin   YOB: 1995   Date of Visit: 11/14/2022       Dear Dr Flavia Gorman:    Thank you for referring Jenifer Crabtree to me for evaluation  Below are my notes for this consultation  If you have questions, please do not hesitate to call me  I look forward to following your patient along with you  Sincerely,        Joaquina Rose MD        CC: No Recipients  Joaquina Rose MD  11/16/2022  3:59 PM  Sign when Signing Visit  A fetal ultrasound was completed  See Ob procedures in Epic for an interpretation and recommendations  Do not hesitate to contact us in [facility] if you have questions  Klaudia Tolliver MD, [address] [facility]  Maternal Fetal Medicine

## 2022-11-16 PROBLEM — Z3A.14 14 WEEKS GESTATION OF PREGNANCY: Status: ACTIVE | Noted: 2022-11-16

## 2022-11-16 PROBLEM — Z36.82 NUCHAL TRANSLUCENCY OF FETUS ON PRENATAL ULTRASOUND: Status: ACTIVE | Noted: 2022-11-16

## 2022-11-16 LAB
BACTERIA BLD CULT: NORMAL
BACTERIA BLD CULT: NORMAL

## 2022-11-16 NOTE — PROGRESS NOTES
A fetal ultrasound was completed  See Ob procedures in Epic for an interpretation and recommendations  Do not hesitate to contact us in [facility] if you have questions  Nelsy Tello MD, [address] [facility]  Maternal Fetal Medicine

## 2022-11-25 ENCOUNTER — TELEPHONE (OUTPATIENT)
Dept: PERINATAL CARE | Facility: CLINIC | Age: 27
End: 2022-11-25

## 2022-11-25 NOTE — TELEPHONE ENCOUNTER
Left voicemail for Rosibel Person reviewing her non invasive prenatal screening (NIPS) results screened low risk, fetal sex not revealed  Explained if Rosibel Person would like to know the fetal sex of her baby, encouraged to review test results in mychart  If patient wishes not want to know fetal sex, advised to not open her test result in mychart  Advised there is routine blood screening for spina bifida that is recommended to complete in the second trimester (16-18 weeks) that will be completed through your obstetrician's office  Contact the  office at [phone] with any questions

## 2022-11-27 ENCOUNTER — HOSPITAL ENCOUNTER (EMERGENCY)
Facility: HOSPITAL | Age: 27
Discharge: HOME | End: 2022-11-27
Attending: EMERGENCY MEDICINE

## 2022-11-27 VITALS
RESPIRATION RATE: 16 BRPM | HEART RATE: 92 BPM | BODY MASS INDEX: 20.06 KG/M2 | HEIGHT: 57 IN | OXYGEN SATURATION: 97 % | DIASTOLIC BLOOD PRESSURE: 55 MMHG | TEMPERATURE: 99.1 F | WEIGHT: 93 LBS | SYSTOLIC BLOOD PRESSURE: 98 MMHG

## 2022-11-27 DIAGNOSIS — Z34.90 PREGNANT: ICD-10-CM

## 2022-11-27 DIAGNOSIS — R19.7 NAUSEA VOMITING AND DIARRHEA: Primary | ICD-10-CM

## 2022-11-27 DIAGNOSIS — R11.2 NAUSEA VOMITING AND DIARRHEA: Primary | ICD-10-CM

## 2022-11-27 LAB
ALBUMIN SERPL BCP-MCNC: 3.6 G/DL (ref 3.5–5)
ALP SERPL-CCNC: 52 U/L (ref 46–116)
ALT SERPL W P-5'-P-CCNC: 14 U/L (ref 12–78)
ANION GAP SERPL CALCULATED.3IONS-SCNC: 14 MMOL/L (ref 4–13)
ATRIAL RATE: 104 BPM
BASOPHILS # BLD MANUAL: 0 THOUSAND/UL (ref 0–0.1)
BASOPHILS NFR MAR MANUAL: 0 % (ref 0–1)
BILIRUB SERPL-MCNC: 0.8 MG/DL (ref 0.2–1)
BUN SERPL-MCNC: 7 MG/DL (ref 5–25)
CALCIUM SERPL-MCNC: 8.9 MG/DL (ref 8.3–10.1)
CHLORIDE SERPL-SCNC: 104 MMOL/L (ref 96–108)
CO2 SERPL-SCNC: 18 MMOL/L (ref 21–32)
CREAT SERPL-MCNC: 0.59 MG/DL (ref 0.6–1.3)
EOSINOPHIL # BLD MANUAL: 0.1 THOUSAND/UL (ref 0–0.4)
EOSINOPHIL NFR BLD MANUAL: 1 % (ref 0–6)
ERYTHROCYTE [DISTWIDTH] IN BLOOD BY AUTOMATED COUNT: 12.1 % (ref 11.6–15.1)
GFR SERPL CREATININE-BSD FRML MDRD: 125 ML/MIN/1.73SQ M
GLUCOSE SERPL-MCNC: 97 MG/DL (ref 65–140)
HCT VFR BLD AUTO: 36.8 % (ref 34.8–46.1)
HGB BLD-MCNC: 12.2 G/DL (ref 11.5–15.4)
LIPASE SERPL-CCNC: 65 U/L (ref 73–393)
LYMPHOCYTES # BLD AUTO: 0.71 THOUSAND/UL (ref 0.6–4.47)
LYMPHOCYTES # BLD AUTO: 7 % (ref 14–44)
MCH RBC QN AUTO: 28.7 PG (ref 26.8–34.3)
MCHC RBC AUTO-ENTMCNC: 33.2 G/DL (ref 31.4–37.4)
MCV RBC AUTO: 87 FL (ref 82–98)
MONOCYTES # BLD AUTO: 0.1 THOUSAND/UL (ref 0–1.22)
MONOCYTES NFR BLD: 1 % (ref 4–12)
NEUTROPHILS # BLD MANUAL: 9.25 THOUSAND/UL (ref 1.85–7.62)
NEUTS BAND NFR BLD MANUAL: 1 % (ref 0–8)
NEUTS SEG NFR BLD AUTO: 90 % (ref 43–75)
P AXIS: 69 DEGREES
PLATELET # BLD AUTO: 215 THOUSANDS/UL (ref 149–390)
PLATELET BLD QL SMEAR: ADEQUATE
PMV BLD AUTO: 11.4 FL (ref 8.9–12.7)
POTASSIUM SERPL-SCNC: 3.1 MMOL/L (ref 3.5–5.3)
PR INTERVAL: 136 MS
PROT SERPL-MCNC: 7.9 G/DL (ref 6.4–8.4)
QRS AXIS: 86 DEGREES
QRSD INTERVAL: 76 MS
QT INTERVAL: 320 MS
QTC INTERVAL: 420 MS
RBC # BLD AUTO: 4.25 MILLION/UL (ref 3.81–5.12)
RBC MORPH BLD: NORMAL
SODIUM SERPL-SCNC: 136 MMOL/L (ref 135–147)
T WAVE AXIS: 26 DEGREES
VENTRICULAR RATE: 104 BPM
WBC # BLD AUTO: 10.16 THOUSAND/UL (ref 4.31–10.16)

## 2022-11-27 RX ORDER — FAMOTIDINE 10 MG/ML
20 INJECTION, SOLUTION INTRAVENOUS ONCE
Status: COMPLETED | OUTPATIENT
Start: 2022-11-27 | End: 2022-11-27

## 2022-11-27 RX ORDER — POTASSIUM CHLORIDE 20MEQ/15ML
40 LIQUID (ML) ORAL ONCE
Status: COMPLETED | OUTPATIENT
Start: 2022-11-27 | End: 2022-11-27

## 2022-11-27 RX ORDER — ONDANSETRON 2 MG/ML
4 INJECTION INTRAMUSCULAR; INTRAVENOUS ONCE
Status: COMPLETED | OUTPATIENT
Start: 2022-11-27 | End: 2022-11-27

## 2022-11-27 RX ORDER — METOCLOPRAMIDE HYDROCHLORIDE 5 MG/ML
10 INJECTION INTRAMUSCULAR; INTRAVENOUS ONCE
Status: COMPLETED | OUTPATIENT
Start: 2022-11-27 | End: 2022-11-27

## 2022-11-27 RX ORDER — POTASSIUM CHLORIDE 20 MEQ/1
40 TABLET, EXTENDED RELEASE ORAL ONCE
Status: DISCONTINUED | OUTPATIENT
Start: 2022-11-27 | End: 2022-11-27

## 2022-11-27 RX ADMIN — SODIUM CHLORIDE 1000 ML: 0.9 INJECTION, SOLUTION INTRAVENOUS at 02:11

## 2022-11-27 RX ADMIN — FAMOTIDINE 20 MG: 10 INJECTION INTRAVENOUS at 00:47

## 2022-11-27 RX ADMIN — MORPHINE SULFATE 2 MG: 2 INJECTION, SOLUTION INTRAMUSCULAR; INTRAVENOUS at 00:47

## 2022-11-27 RX ADMIN — ONDANSETRON 4 MG: 2 INJECTION INTRAMUSCULAR; INTRAVENOUS at 00:47

## 2022-11-27 RX ADMIN — METOCLOPRAMIDE 10 MG: 5 INJECTION, SOLUTION INTRAMUSCULAR; INTRAVENOUS at 02:09

## 2022-11-27 RX ADMIN — POTASSIUM CHLORIDE 40 MEQ: 20 SOLUTION ORAL at 01:29

## 2022-11-27 RX ADMIN — SODIUM CHLORIDE 1000 ML: 0.9 INJECTION, SOLUTION INTRAVENOUS at 00:39

## 2022-11-27 NOTE — ED PROVIDER NOTES
History  Chief Complaint   Patient presents with   • Vomiting     27 yo female who is 16 weeks pregnant and presents with "stomach bug" since yesterday  Pt reports  and their 9 mo have same  Pt having nausea, vomiting and diarrhea with epigastric abd pain from vomiting and abd cramping  Pt recently 11/11/22 was here for abd pain and had MRI r/o appy which was neg  History provided by:  Patient   used: No    Vomiting  Severity:  Moderate  Duration:  2 days  Timing:  Constant  Quality:  Stomach contents and bilious material  Progression:  Unchanged  Chronicity:  New  Recent urination:  Decreased  Relieved by:  Nothing  Worsened by:  Nothing  Ineffective treatments:  Antiemetics  Associated symptoms: abdominal pain, chills and diarrhea    Associated symptoms: no arthralgias, no cough, no fever, no myalgias and no sore throat    Abdominal pain:     Location:  Generalized    Quality: cramping      Severity:  Moderate    Onset quality:  Gradual    Duration:  2 days    Timing:  Constant    Progression:  Waxing and waning  Risk factors: sick contacts ( and child with similar symptoms)        Prior to Admission Medications   Prescriptions Last Dose Informant Patient Reported? Taking?    Acetaminophen 500 MG   Yes No   Sig: if needed   ferrous sulfate 325 (65 Fe) mg tablet   Yes No   Sig: Take 325 mg by mouth daily with breakfast   ondansetron (Zofran ODT) 4 mg disintegrating tablet   No No   Sig: Take 1 tablet (4 mg total) by mouth every 6 (six) hours as needed for nausea or vomiting   polyethylene glycol (MIRALAX) 17 g packet   Yes No   Sig: Take 17 g by mouth daily      Facility-Administered Medications: None       Past Medical History:   Diagnosis Date   • Anemia    • GERD (gastroesophageal reflux disease)    • Headache, migraine    • Hemorrhoids    • Irritable bowel syndrome    • Ovarian cyst, left    • Papanicolaou smear 02/2019   • Stomach ulcer    • Syncope        Past Surgical History:   Procedure Laterality Date   •  SECTION      2   • COLONOSCOPY     • VT  DELIVERY ONLY N/A 2022    Procedure:  SECTION () REPEAT;  Surgeon: Lorenza Lee DO;  Location: [facility];  Service: Obstetrics   • TONSILLECTOMY     • WISDOM TOOTH EXTRACTION         Family History   Adopted: Yes   Problem Relation Age of Onset   • No Known Problems Sister    • Breast cancer Neg Hx    • Colon cancer Neg Hx    • Ovarian cancer Neg Hx      I have reviewed and agree with the history as documented  E-Cigarette/Vaping   • E-Cigarette Use Never User      E-Cigarette/Vaping Substances   • Nicotine No    • THC No    • CBD No    • Flavoring No    • Other No    • Unknown No      Social History     Tobacco Use   • Smoking status: Never   • Smokeless tobacco: Never   Vaping Use   • Vaping Use: Never used   Substance Use Topics   • Alcohol use: Never   • Drug use: Never       Review of Systems   Constitutional: Positive for appetite change, chills and fatigue  Negative for fever  HENT: Negative for congestion, ear pain and sore throat  Eyes: Negative for pain and visual disturbance  Respiratory: Negative for cough and shortness of breath  Cardiovascular: Negative for chest pain and palpitations  Gastrointestinal: Positive for abdominal pain, diarrhea and vomiting  Genitourinary: Negative for dysuria and hematuria  Musculoskeletal: Negative for arthralgias, back pain and myalgias  Skin: Negative for color change and rash  Neurological: Negative for seizures and syncope  All other systems reviewed and are negative  Physical Exam  Physical Exam  Vitals and nursing note reviewed  Constitutional:       General: She is not in acute distress  Appearance: She is well-developed  She is ill-appearing  HENT:      Head: Normocephalic and atraumatic        Nose: Nose normal       Mouth/Throat:      Comments: MM tachy  Eyes:      Conjunctiva/sclera: Conjunctivae normal    Cardiovascular:      Rate and Rhythm: Regular rhythm  Tachycardia present  Heart sounds: No murmur heard  Pulmonary:      Effort: Pulmonary effort is normal  No respiratory distress  Breath sounds: Normal breath sounds  Abdominal:      Palpations: Abdomen is soft  Tenderness: There is abdominal tenderness (mild epigastric)  There is no right CVA tenderness, left CVA tenderness or guarding  Musculoskeletal:         General: No swelling  Cervical back: Neck supple  Right lower leg: No edema  Left lower leg: No edema  Skin:     General: Skin is warm and dry  Capillary Refill: Capillary refill takes less than 2 seconds  Neurological:      Mental Status: She is alert and oriented to person, place, and time        Deep Tendon Reflexes: Reflexes normal    Psychiatric:         Mood and Affect: Mood normal          Behavior: Behavior normal          Vital Signs  ED Triage Vitals [11/27/22 0025]   Temperature Pulse Respirations Blood Pressure SpO2   98 2 °F (36 8 °C) 104 16 116/67 99 %      Temp Source Heart Rate Source Patient Position - Orthostatic VS BP Location FiO2 (%)   Oral Monitor Sitting Right arm --      Pain Score       8           Vitals:    11/27/22 0025 11/27/22 0225 11/27/22 0230 11/27/22 0300   BP: 116/67 112/57 112/57 98/55   Pulse: 104 97 104 92   Patient Position - Orthostatic VS: Sitting            Visual Acuity      ED Medications  Medications   sodium chloride 0 9 % bolus 1,000 mL (0 mL Intravenous Stopped 11/27/22 0209)   ondansetron (ZOFRAN) injection 4 mg (4 mg Intravenous Given 11/27/22 0047)   morphine injection 2 mg (2 mg Intravenous Given 11/27/22 0047)   Famotidine (PF) (PEPCID) injection 20 mg (20 mg Intravenous Given 11/27/22 0047)   potassium chloride oral solution 40 mEq (40 mEq Oral Given 11/27/22 0129)   metoclopramide (REGLAN) injection 10 mg (10 mg Intravenous Given 11/27/22 0209)   sodium chloride 0 9 % bolus 1,000 mL (0 mL Intravenous Stopped 11/27/22 0337)       Diagnostic Studies  Results Reviewed     Procedure Component Value Units Date/Time    CBC and differential [[id number]]  (Normal) Collected: 11/27/22 0040    Lab Status: Final result Specimen: Blood from Arm, Right Updated: 11/27/22 0130     WBC 10 16 Thousand/uL      RBC 4 25 Million/uL      Hemoglobin 12 2 g/dL      Hematocrit 36 8 %      MCV 87 fL      MCH 28 7 pg      MCHC 33 2 g/dL      RDW 12 1 %      MPV 11 4 fL      Platelets 788 Thousands/uL     Narrative: This is an appended report  These results have been appended to a previously verified report      Manual Differential(PHLEBS Do Not Order) [[id number]]  (Abnormal) Collected: 11/27/22 0040    Lab Status: Final result Specimen: Blood from Arm, Right Updated: 11/27/22 0130     Segmented % 90 %      Bands % 1 %      Lymphocytes % 7 %      Monocytes % 1 %      Eosinophils, % 1 %      Basophils % 0 %      Absolute Neutrophils 9 25 Thousand/uL      Lymphocytes Absolute 0 71 Thousand/uL      Monocytes Absolute 0 10 Thousand/uL      Eosinophils Absolute 0 10 Thousand/uL      Basophils Absolute 0 00 Thousand/uL      Total Counted --     RBC Morphology Normal     Platelet Estimate Adequate    Comprehensive metabolic panel [[id number]]  (Abnormal) Collected: 11/27/22 0040    Lab Status: Final result Specimen: Blood from Arm, Right Updated: 11/27/22 0113     Sodium 136 mmol/L      Potassium 3 1 mmol/L      Chloride 104 mmol/L      CO2 18 mmol/L      ANION GAP 14 mmol/L      BUN 7 mg/dL      Creatinine 0 59 mg/dL      Glucose 97 mg/dL      Calcium 8 9 mg/dL      AST --     ALT 14 U/L      Alkaline Phosphatase 52 U/L      Total Protein 7 9 g/dL      Albumin 3 6 g/dL      Total Bilirubin 0 80 mg/dL      eGFR 125 ml/min/1 73sq m     Narrative:      [place] guidelines for Chronic Kidney Disease (CKD):   •  Stage 1 with normal or high GFR (GFR > 90 mL/min/1 73 square meters)  •  Stage 2 Mild CKD (GFR = 60-89 mL/min/1 73 square meters)  •  Stage 3A Moderate CKD (GFR = 45-59 mL/min/1 73 square meters)  •  Stage 3B Moderate CKD (GFR = 30-44 mL/min/1 73 square meters)  •  Stage 4 Severe CKD (GFR = 15-29 mL/min/1 73 square meters)  •  Stage 5 End Stage CKD (GFR <15 mL/min/1 73 square meters)  Note: GFR calculation is accurate only with a steady state creatinine    Lipase [[id number]]  (Abnormal) Collected: 11/27/22 0040    Lab Status: Final result Specimen: Blood from Arm, Right Updated: 11/27/22 0113     Lipase 65 u/L                  No orders to display              Procedures  Procedures         ED Course  ED Course as of 11/27/22 0401   Sun Nov 27, 2022   0024 Pt seen and examined  29 yo female who is 16 weeks pregnant and presents with "stomach bug" since yesterday  Pt reports  and their 9 mo have same  Pt having nausea, vomiting and diarrhea with epigastric abd pain from vomiting and abd cramping  Pt recently 11/11/22 was here for abd pain and had MRI r/o appy which was neg  Will give IVF, zofran, pepcid, morphine and check labs, urine and FHT  0117 K 3 1 - will order kdur     0130 Pt feels much better, understands K is low due to V and D, would rather take liquid     0204 Nausea returned, mild pain as well - will give another L IVF and Reglan 10mg     0309 Pt feels great and wants to go home and sleep  Has zofran to take prn at home  SBIRT 20yo+    Flowsheet Row Most Recent Value   SBIRT (25 yo +)    In order to provide better care to our patients, we are screening all of our patients for alcohol and drug use  Would it be okay to ask you these screening questions? Yes Filed at: 11/27/2022 0035   Initial Alcohol Screen: US AUDIT-C     1  How often do you have a drink containing alcohol? 0 Filed at: 11/27/2022 0035   2  How many drinks containing alcohol do you have on a typical day you are drinking? 0 Filed at: 11/27/2022 0035   3a  Male UNDER 65:  How often do you have five or more drinks on one occasion? 0 Filed at: 11/27/2022 0035   3b  FEMALE Any Age, or MALE 65+: How often do you have 4 or more drinks on one occassion? 0 Filed at: 11/27/2022 0035   Audit-C Score 0 Filed at: 11/27/2022 8466   KAZ: How many times in the past year have you    Used an illegal drug or used a prescription medication for non-medical reasons? Never Filed at: 11/27/2022 1166                    MDM    Disposition  Final diagnoses:   Nausea vomiting and diarrhea   Pregnant     Time reflects when diagnosis was documented in both MDM as applicable and the Disposition within this note     Time User Action Codes Description Comment    11/27/2022  3:12 AM Imtiaz DODD Add [R11 2,  R19 7] Nausea vomiting and diarrhea     11/27/2022  3:12 AM Martha Garcia Add [Z34 90] Pregnant       ED Disposition     ED Disposition   Discharge    Condition   Stable    Date/Time   Sun Nov 27, 2022  3:13 AM    Comment   [facility] discharge to home/self care  Follow-up Information    None         Discharge Medication List as of 11/27/2022  3:13 AM      CONTINUE these medications which have NOT CHANGED    Details   Acetaminophen 500 MG if needed, Historical Med      ferrous sulfate 325 (65 Fe) mg tablet Take 325 mg by mouth daily with breakfast, Historical Med      ondansetron (Zofran ODT) 4 mg disintegrating tablet Take 1 tablet (4 mg total) by mouth every 6 (six) hours as needed for nausea or vomiting, Starting Sat 11/12/2022, Normal      polyethylene glycol (MIRALAX) 17 g packet Take 17 g by mouth daily, Historical Med             No discharge procedures on file      PDMP Review     None          ED Provider  Electronically Signed by           Wilfrid Francis DO  11/27/22 6244

## 2022-11-27 NOTE — ED NOTES
Pt rang call bell  States she has nausea and abdominal pain   Dr Leslie Wolf aware of same     Eileen Simon, RN  11/27/22 2995

## 2022-11-27 NOTE — ED TRIAGE NOTES
Pt arrives ambulatory to the ED from home with c/o nausea, vomiting, diarrhea, and x 2 syncopal episodes  Pt reports her  caught her both times, denies trauma  Pt reports she is unable to keep any water/food down  Pt reports the rest of her family at home has the same symptoms  Pt reports she is 16 weeks pregnant, follows with Sunil ALMEIDA

## 2022-12-07 ENCOUNTER — ROUTINE PRENATAL (OUTPATIENT)
Dept: OBGYN CLINIC | Facility: CLINIC | Age: 27
End: 2022-12-07

## 2022-12-07 VITALS
SYSTOLIC BLOOD PRESSURE: 92 MMHG | WEIGHT: 98.4 LBS | BODY MASS INDEX: 21.23 KG/M2 | HEIGHT: 57 IN | DIASTOLIC BLOOD PRESSURE: 58 MMHG

## 2022-12-07 DIAGNOSIS — O09.899 SHORT INTERVAL BETWEEN PREGNANCIES AFFECTING PREGNANCY, ANTEPARTUM: Primary | ICD-10-CM

## 2022-12-07 DIAGNOSIS — R76.0 ABNORMAL ANTIBODY TITER: ICD-10-CM

## 2022-12-07 DIAGNOSIS — Z3A.18 18 WEEKS GESTATION OF PREGNANCY: ICD-10-CM

## 2022-12-07 PROBLEM — J32.9 SINUSITIS: Status: ACTIVE | Noted: 2022-12-07

## 2022-12-07 LAB
SL AMB  POCT GLUCOSE, UA: NEGATIVE
SL AMB POCT URINE PROTEIN: NEGATIVE

## 2022-12-07 NOTE — PROGRESS NOTES
Routine Prenatal Visit  [address]   [address] [facility], [address]    Assessment/Plan:  Stefania Bales is a 28y o  year old [id number] at 18w0d who presents for routine prenatal visit  1  18 weeks gestation of pregnancy  -     POCT urine dip        Next OB Visit 4 weeks  Subjective:     CC: Prenatal care    Shannen Edmonds is a 28 y o  [id number] female who presents for routine prenatal care at 18w0d  Pregnancy ROS: No FM, N/V, HA, cramping, VB, LOF, edema, domestic violence, or smoking  Tolerating PNV  The following portions of the patient's history were reviewed and updated as appropriate: allergies, current medications, past family history, past medical history, obstetric history, gynecologic history, past social history, past surgical history and problem list       Objective:  BP 92/58 (BP Location: Left arm, Patient Position: Sitting, Cuff Size: Standard)   Ht 4' 9" (1 448 m)   Wt 44 6 kg (98 lb 6 4 oz)   LMP 2022 (Exact Date)   BMI 21 29 kg/m²   Pregravid Weight/BMI: 39 9 kg (88 lb) (BMI 19 04)  Current Weight: 44 6 kg (98 lb 6 4 oz)   Total Weight Gain: 4 717 kg (10 lb 6 4 oz)   Pre-Susan Vitals    Flowsheet Row Most Recent Value   Prenatal Assessment    Prenatal Vitals    Blood Pressure 92/58   Weight - Scale 44 6 kg (98 lb 6 4 oz)   Urine Albumin/Glucose    Dilation/Effacement/Station    Vaginal Drainage    Edema            General: Well appearing, no distress  Abdomen: Soft, gravid, nontender  Fundal Height: Appropriate for gestational age  Extremities: Warm and well perfused  Non tender

## 2022-12-07 NOTE — ASSESSMENT & PLAN NOTE
Patient asked about the possibility of vaginal delivery  Reviewed concern of short interval pregnancy and two previous  increasing risk of uterine rupture  It is not recommended to attempt

## 2022-12-07 NOTE — PROGRESS NOTES
Routine Prenatal Visit  [address]   [address] [facility], [address]    Assessment/Plan:  Aaron Meyers is a 28y o  year old [id number] at 18w0d who presents for routine prenatal visit  1  Short interval between pregnancies affecting pregnancy, antepartum  Assessment & Plan:  Patient asked about the possibility of vaginal delivery  Reviewed concern of short interval pregnancy and two previous  increasing risk of uterine rupture  It is not recommended to attempt   2  18 weeks gestation of pregnancy  Assessment & Plan:  Reviewed AFP to screen for ONTD  Script given  Aware to have done prior to 22 weeks  Continue routine prenatal care  Return to office for ob check in 4 weeks  Orders:  -     POCT urine dip  -     Alpha fetoprotein, maternal; Future  -     Alpha fetoprotein, maternal    3  Abnormal antibody titer  Assessment & Plan:  Script for repeat antibody screen reprinted for patient  Next OB Visit 4 weeks  Subjective:     CC: Prenatal care    lola Conklin is a 28 y o  [id number] female who presents for routine prenatal care at 18w0d  Pregnancy ROS: Good Fm, Nausea improving, taking Zofran as needed  No  HA, cramping, VB, LOF, edema, domestic violence, or smoking  Tolerating PNV  A positive blood type, Antibody screen positive on initial prenatal labs, nonspecific, too weak to identify         The following portions of the patient's history were reviewed and updated as appropriate: allergies, current medications, past family history, past medical history, obstetric history, gynecologic history, past social history, past surgical history and problem list       Objective:  BP 92/58 (BP Location: Left arm, Patient Position: Sitting, Cuff Size: Standard)   Ht 4' 9" (1 448 m)   Wt 44 6 kg (98 lb 6 4 oz)   LMP 2022 (Exact Date)   BMI 21 29 kg/m²   Pregravid Weight/BMI: 39 9 kg (88 lb) (BMI 19 04)  Current Weight: 44 6 kg (98 lb 6 4 oz)   Total Weight Gain: 4 717 kg (10 lb 6 4 oz)   Pre- Vitals    Flowsheet Row Most Recent Value   Prenatal Assessment    Fetal Heart Rate 150   Fundal Height (cm) 18 cm   Movement Present   Prenatal Vitals    Blood Pressure 92/58   Weight - Scale 44 6 kg (98 lb 6 4 oz)   Urine Albumin/Glucose    Dilation/Effacement/Station    Vaginal Drainage    Draining Fluid No   Edema    LLE Edema None   RLE Edema None   Facial Edema None           General: Well appearing, no distress  Abdomen: Soft, gravid, nontender  Fundal Height: Appropriate for gestational age  Extremities: Warm and well perfused  Non tender

## 2022-12-20 NOTE — PROGRESS NOTES
Please refer to the [facility] ultrasound report in Ob Procedures for additional information regarding today's visit

## 2022-12-21 ENCOUNTER — ROUTINE PRENATAL (OUTPATIENT)
Facility: HOSPITAL | Age: 27
End: 2022-12-21

## 2022-12-21 VITALS
DIASTOLIC BLOOD PRESSURE: 60 MMHG | SYSTOLIC BLOOD PRESSURE: 98 MMHG | HEART RATE: 96 BPM | WEIGHT: 97.4 LBS | HEIGHT: 57 IN | BODY MASS INDEX: 21.01 KG/M2

## 2022-12-21 DIAGNOSIS — Z98.891 HISTORY OF 2 CESAREAN SECTIONS: ICD-10-CM

## 2022-12-21 DIAGNOSIS — Z36.86 ENCOUNTER FOR ANTENATAL SCREENING FOR CERVICAL LENGTH: ICD-10-CM

## 2022-12-21 DIAGNOSIS — Z36.3 ENCOUNTER FOR ANTENATAL SCREENING FOR MALFORMATIONS: Primary | ICD-10-CM

## 2022-12-21 DIAGNOSIS — O09.292 HISTORY OF INTRAUTERINE GROWTH RESTRICTION IN PRIOR PREGNANCY, CURRENTLY PREGNANT, SECOND TRIMESTER: ICD-10-CM

## 2022-12-21 DIAGNOSIS — Z3A.20 20 WEEKS GESTATION OF PREGNANCY: ICD-10-CM

## 2022-12-21 DIAGNOSIS — O09.892 SHORT INTERVAL BETWEEN PREGNANCIES COMPLICATING PREGNANCY, ANTEPARTUM, SECOND TRIMESTER: ICD-10-CM

## 2022-12-21 NOTE — PROGRESS NOTES
Ultrasound Probe Disinfection    A transvaginal ultrasound was performed  Prior to use, disinfection was performed with High Level Disinfection Process (Trophon)  Probe serial number A3: [id number] was used        Jori Oconnell  12/21/22  1:17 PM

## 2022-12-21 NOTE — LETTER
December 21, 2022     Lucas Osorio MD  [facility] [address]  [address]  [facility]    Patient: Cora Krishna   YOB: 1995   Date of Visit: 12/21/2022       Dear Dr Gigi Bell:    Thank you for referring Jaleel Acevedo to me for evaluation  Below are my notes for this consultation  If you have questions, please do not hesitate to call me  I look forward to following your patient along with you           Sincerely,        Andres Limon MD        CC: No Recipients  Andres Limon MD  12/20/2022  4:31 PM  Sign when Signing Visit  Please refer to the [facility] ultrasound report in Ob Procedures for additional information regarding today's visit Madhavi Aguirre (Scribe)

## 2022-12-23 PROBLEM — R76.0 ABNORMAL ANTIBODY TITER: Status: RESOLVED | Noted: 2022-10-28 | Resolved: 2022-12-23

## 2022-12-23 LAB — BLD GP AB SCN SERPL QL: NEGATIVE

## 2023-01-06 ENCOUNTER — ROUTINE PRENATAL (OUTPATIENT)
Dept: OBGYN CLINIC | Facility: CLINIC | Age: 28
End: 2023-01-06

## 2023-01-06 VITALS
HEIGHT: 57 IN | DIASTOLIC BLOOD PRESSURE: 64 MMHG | BODY MASS INDEX: 22.01 KG/M2 | SYSTOLIC BLOOD PRESSURE: 96 MMHG | WEIGHT: 102 LBS

## 2023-01-06 DIAGNOSIS — O34.211 MATERNAL CARE DUE TO LOW TRANSVERSE UTERINE SCAR FROM PREVIOUS CESAREAN DELIVERY: ICD-10-CM

## 2023-01-06 DIAGNOSIS — Z23 FLU VACCINE NEED: ICD-10-CM

## 2023-01-06 DIAGNOSIS — Z3A.22 22 WEEKS GESTATION OF PREGNANCY: Primary | ICD-10-CM

## 2023-01-06 DIAGNOSIS — O99.019 ANEMIA AFFECTING PREGNANCY, ANTEPARTUM: ICD-10-CM

## 2023-01-06 DIAGNOSIS — O34.10 UTERINE FIBROID IN PREGNANCY: ICD-10-CM

## 2023-01-06 DIAGNOSIS — Z87.59 HISTORY OF PRIOR PREGNANCY WITH IUGR NEWBORN: ICD-10-CM

## 2023-01-06 DIAGNOSIS — Z83.49 FAMILY HISTORY OF G6PD: ICD-10-CM

## 2023-01-06 DIAGNOSIS — D25.9 UTERINE FIBROID IN PREGNANCY: ICD-10-CM

## 2023-01-06 DIAGNOSIS — O09.899 SHORT INTERVAL BETWEEN PREGNANCIES AFFECTING PREGNANCY, ANTEPARTUM: ICD-10-CM

## 2023-01-06 LAB
SL AMB  POCT GLUCOSE, UA: NEGATIVE
SL AMB POCT URINE PROTEIN: NEGATIVE

## 2023-01-06 NOTE — PROGRESS NOTES
Routine Prenatal Visit  [address], [facility], [address]    Assessment/Plan:  Glenroy Beckman is a 28y o  year old [id number] at 22w2d who presents for routine prenatal visit  1  22 weeks gestation of pregnancy  -     POCT urine dip    2  Uterine fibroid in pregnancy    3  Maternal care due to low transverse uterine scar from previous  delivery    4  Family history of G6PD    5  Short interval between pregnancies affecting pregnancy, antepartum    6  Anemia affecting pregnancy, antepartum    7  History of prior pregnancy with IUGR     + fm  No UTCX  Flu shot today! US reviewed   AGA  Subjective:     CC: Prenatal care    Catia Caballero is a 28 y o  [id number] female who presents for routine prenatal care at 22w2d  Pregnancy ROS: no  leakage of fluid, pelvic pain, or vaginal bleeding   + fetal movement      The following portions of the patient's history were reviewed and updated as appropriate: allergies, current medications, past family history, past medical history, obstetric history, gynecologic history, past social history, past surgical history and problem list       Objective:  BP 96/64 (BP Location: Left arm, Patient Position: Sitting, Cuff Size: Standard)   Ht 4' 9" (1 448 m)   Wt 46 3 kg (102 lb)   LMP 2022 (Exact Date)   BMI 22 07 kg/m²   Pregravid Weight/BMI: 39 9 kg (88 lb) (BMI 19 04)  Current Weight: 46 3 kg (102 lb)   Total Weight Gain: 6 35 kg (14 lb)   Pre- Vitals    Flowsheet Row Most Recent Value   Prenatal Assessment    Fetal Heart Rate 138-144   Fundal Height (cm) 22 cm   Movement Present   Prenatal Vitals    Blood Pressure 96/64   Weight - Scale 46 3 kg (102 lb)   Urine Albumin/Glucose    Dilation/Effacement/Station    Vaginal Drainage    Draining Fluid No   Edema    LLE Edema None   RLE Edema None   Facial Edema None           General: Well appearing, no distress  Respiratory: Unlabored breathing  Cardiovascular: Regular rate  Abdomen: Soft, gravid, nontender  Fundal Height: Appropriate for gestational age  Extremities: Warm and well perfused  Non tender

## 2023-01-06 NOTE — PATIENT INSTRUCTIONS
NUTRITION IN PREGNANCY  Good Nutrition is a VERY important part of having a healthy pregnancy and healthy baby  You should follow a healthy diet which include the following: * Vegetables (which are dark green and leafy): at least 2 servings each day   * Protein (meat, eggs, beans, nuts, peanut butter): 3-4 servings each day   * Breads/whole grains (bread, pasta, rice, tortillas, potatoes): 3 servings each day   * Dairy (milk, yogurt, cheese): 3-4 servings each day   * Water: 6-8 glasses per day   * Calories: approximately 2000 to 2200 calories per day     WEIGHT GAIN   Recommended weight gain for you during your pregnancy is based on your body mass index (BMI) at the time that you became pregnant  Pre-pregnant BMI Recommended weight gain   Underweight (BMI less than 18 5) 28 to 40 pounds   Normal weight (BMI 18 5-24 9) 25 to 35 pounds  Overweight (BMI 25-29 9) 15 to 25 pounds   Obese (BMI 30 or greater) 11 to 20 pounds     FOOD SAFETY   It is VERY important to eat only safely-prepared foods during pregnancy as you and your baby have a higher risk than usual for being affected by foodborne illnesses    Follow these steps to ensure that you and your baby are safe from foodborne illnesses while you are pregnant:   wash hands thoroughly with warm water and soap before and after handling any foods   wash cutting boards, dishes, utensils, and countertops with hot water and soap before and after preparing any foods   rinse raw fruits and vegetables thoroughly under running water before eating   keep raw meat and seafood separate from other foods and use different cutting boards/utensils to handle raw meat than for other foods   put cooked food on a freshly clean plate   cook all of your foods thoroughly   discard foods that have been left out for more than 2 hours   refrigerate or freeze any foods than can spoil     There are three particular foodborne risks that you should be aware of and avoid as they can cause serious harm to your unborn child  * Listeria (a harmful bacteria)   don’t eat hot dogs or deli meats (unless they’re reheated until steaming hot)   don’t eat soft cheeses (such as Feta, Brie, Camembert) unless they are specifically labeled as being “made with pasteurized milk”   don’t drink raw (unpasteurized) milk   don’t eat refrigerated pates or meat spreads   don’t eat refrigerated smoked seafood unless it’s in a cooked dish like a casserole     * Mercury (a metal which is found in certain fish in high levels)   don’t eat shark, tilefish, aleksandar mackerel, or swordfish   don’t eat more than 12 ounces per week of shrimp, salmon, pollock, or catfish   when eating tuna fish, you can have up to 6 ounces per week of canned albacore tuna OR up to 12 ounces of canned light tuna     * Toxoplasma (a harmful parasite)   cook meat thoroughly before eating   wear gloves when gardening or handling sand from a child’s sandbox   if you ha tve a cat, have someone else change the litter box while you are pregnant      if you HAVE to clean it yourself, be sure to wash your hands thoroughly afterwards with warm water and soap    don’t get a NEW cat while you are pregnant

## 2023-02-01 ENCOUNTER — PATIENT MESSAGE (OUTPATIENT)
Dept: OBGYN CLINIC | Facility: CLINIC | Age: 28
End: 2023-02-01

## 2023-02-01 ENCOUNTER — ROUTINE PRENATAL (OUTPATIENT)
Dept: OBGYN CLINIC | Facility: CLINIC | Age: 28
End: 2023-02-01

## 2023-02-01 VITALS
HEART RATE: 72 BPM | DIASTOLIC BLOOD PRESSURE: 68 MMHG | WEIGHT: 104.6 LBS | SYSTOLIC BLOOD PRESSURE: 92 MMHG | HEIGHT: 58 IN | BODY MASS INDEX: 21.96 KG/M2

## 2023-02-01 DIAGNOSIS — O26.892 PREGNANCY HEADACHE IN SECOND TRIMESTER: ICD-10-CM

## 2023-02-01 DIAGNOSIS — O34.211 MATERNAL CARE DUE TO LOW TRANSVERSE UTERINE SCAR FROM PREVIOUS CESAREAN DELIVERY: Primary | ICD-10-CM

## 2023-02-01 DIAGNOSIS — Z3A.26 26 WEEKS GESTATION OF PREGNANCY: ICD-10-CM

## 2023-02-01 DIAGNOSIS — Z36.89 ENCOUNTER FOR OTHER SPECIFIED ANTENATAL SCREENING: ICD-10-CM

## 2023-02-01 DIAGNOSIS — O09.899 SHORT INTERVAL BETWEEN PREGNANCIES AFFECTING PREGNANCY, ANTEPARTUM: ICD-10-CM

## 2023-02-01 DIAGNOSIS — O99.810 ABNORMAL GLUCOSE AFFECTING PREGNANCY: ICD-10-CM

## 2023-02-01 DIAGNOSIS — R51.9 PREGNANCY HEADACHE IN SECOND TRIMESTER: ICD-10-CM

## 2023-02-01 DIAGNOSIS — O99.019 ANEMIA AFFECTING PREGNANCY, ANTEPARTUM: ICD-10-CM

## 2023-02-01 LAB
SL AMB  POCT GLUCOSE, UA: NORMAL
SL AMB POCT URINE PROTEIN: NORMAL

## 2023-02-01 NOTE — PATIENT COMMUNICATION
Spoke with Angela Morrissey who reports for the past several weeks she has been having daily intermittent headaches  She will drink water/rest-headache improves but comes back shortly after  Minimal improvement with tylenol  She feels becomes worse when she lies down  She drinks about 4-32 ounce bottles of water per day  Feels heart racing throughout the day  Does not have b/p cuff at home  Patient in agreement to reschedule OB appt today in [place] with Dr Prince Musa for evaluation

## 2023-02-01 NOTE — ASSESSMENT & PLAN NOTE
Eduardo [facility] states daily headaches last week or so, with dizziness sometimes and heart racing  She states BP at home on low side, always has been  States trying to drink a lot  Very busy with 3o and 9 month old at home  Sometimes takes tylenol  Reviewed making sure drinking throughout the day and if dizzy sit down, have some protein and sugar  Suggested increase salt in diet to bring up blood pressure  BP here 92/68 and pulse 72  Gave CBC and 1hr GTT orders - can do now as she is 26 weeks  Also recommend Magnesium and Vit B2 daily for headache prophylaxis

## 2023-02-01 NOTE — PATIENT INSTRUCTIONS
- Continue prenatal vitamins and all prescribed medications  - Try to drink 64 oz of water or other non-caffeinated fluids daily    - Fetal kick counts (to be done daily or if note a decrease in fetal movement):  in a quiet place, after a meal or drinking something sweet, lie on your side and count movements  Should get 10 movements in 2 hours  Call office if less than this  - Call office if leaking of fluid, bleeding, contractions >5-6/hour which don't decrease with rest, oral hydration, or emptying bladder, or decreased fetal movement         Supplements to prevent or decrease headaches/migraines in pregnancy:   - Magnesium 400mg oral daily   - Vit B2 400mg oral daily

## 2023-02-01 NOTE — PROGRESS NOTES
Routine Prenatal Visit  [address], [address], [facility], [address] [facility]    Assessment/Plan:  Jeremy Greene is a 28y o  year old [id number] at 26w0d who presents for routine prenatal visit  1  Maternal care due to low transverse uterine scar from previous  delivery  Assessment & Plan:  Needs to schedule repeat LTCS  Msg to surgical scheduler to call pt to arrange  2  Pregnancy headache in second trimester  Comments:  c/o daily headaches recently  trying to drink more  Sometimes dizziness  Takes tylenol when needs to  Assessment & Plan:  Jeremy Greene states daily headaches last week or so, with dizziness sometimes and heart racing  She states BP at home on low side, always has been  States trying to drink a lot  Very busy with 3o and 9 month old at home  Sometimes takes tylenol  Reviewed making sure drinking throughout the day and if dizzy sit down, have some protein and sugar  Suggested increase salt in diet to bring up blood pressure  BP here 92/68 and pulse 72  Gave CBC and 1hr GTT orders - can do now as she is 26 weeks  Also recommend Magnesium and Vit B2 daily for headache prophylaxis  3  Short interval between pregnancies affecting pregnancy, antepartum  Assessment & Plan:  Growth u/s scheduled at 32 weeks  4  Encounter for other specified  screening  -     Glucose, 1H PG; Future  -     CBC; Future  -     RPR, Rfx Qn RPR/Confirm TP; Future  -     Glucose, 1H PG  -     CBC  -     RPR, Rfx Qn RPR/Confirm TP    5  26 weeks gestation of pregnancy  -     POCT urine dip      Next OB Visit 2 weeks  Subjective:     CC: Prenatal care    Sherri Wilkerson is a 28 y o  [id number] female who presents for routine prenatal care at 26w0d  Pregnancy ROS: no leakage of fluid, pelvic pain, or vaginal bleeding  normal fetal movement      The following portions of the patient's history were reviewed and updated as appropriate: allergies, current medications, past family history, past medical history, obstetric history, gynecologic history, past social history, past surgical history and problem list       Objective:  BP 92/68 (BP Location: Left arm, Patient Position: Sitting, Cuff Size: Standard)   Pulse 72   Ht 4' 10" (1 473 m)   Wt 47 4 kg (104 lb 9 6 oz)   LMP 2022 (Exact Date)   BMI 21 86 kg/m²   Pregravid Weight/BMI: 39 9 kg (88 lb) (BMI 18 40)  Current Weight: 47 4 kg (104 lb 9 6 oz)   Total Weight Gain: 7 53 kg (16 lb 9 6 oz)   Pre-Susan Vitals    Flowsheet Row Most Recent Value   Prenatal Assessment    Fetal Heart Rate 145   Fundal Height (cm) 26 cm   Movement Present   Prenatal Vitals    Blood Pressure 92/68   Weight - Scale 47 4 kg (104 lb 9 6 oz)   Urine Albumin/Glucose    Dilation/Effacement/Station    Vaginal Drainage    Edema    LLE Edema None   RLE Edema None           General: Well appearing, no distress  Abdomen: Soft, gravid, nontender  Extremities: Non tender

## 2023-02-03 ENCOUNTER — TELEPHONE (OUTPATIENT)
Dept: OBGYN CLINIC | Facility: CLINIC | Age: 28
End: 2023-02-03

## 2023-02-03 NOTE — TELEPHONE ENCOUNTER
----- Message from Flora Liz MD sent at 2/1/2023  3:30 PM EST -----  Regarding: repeat LTCS at 39 weeks  Needs repeat LTCS scheduled at 39 weeks

## 2023-02-05 PROBLEM — J32.9 SINUSITIS: Status: RESOLVED | Noted: 2022-12-07 | Resolved: 2023-02-05

## 2023-02-10 DIAGNOSIS — O99.019 ANEMIA AFFECTING PREGNANCY, ANTEPARTUM: Primary | ICD-10-CM

## 2023-02-10 PROBLEM — O99.810 ABNORMAL GLUCOSE AFFECTING PREGNANCY: Status: ACTIVE | Noted: 2023-02-10

## 2023-02-10 LAB
ERYTHROCYTE [DISTWIDTH] IN BLOOD BY AUTOMATED COUNT: 12 % (ref 11.7–15.4)
GLUCOSE 1H P 50 G GLC PO SERPL-MCNC: 140 MG/DL (ref 70–139)
HCT VFR BLD AUTO: 28.1 % (ref 34–46.6)
HGB BLD-MCNC: 9.1 G/DL (ref 11.1–15.9)
MCH RBC QN AUTO: 26.3 PG (ref 26.6–33)
MCHC RBC AUTO-ENTMCNC: 32.4 G/DL (ref 31.5–35.7)
MCV RBC AUTO: 81 FL (ref 79–97)
PLATELET # BLD AUTO: 186 X10E3/UL (ref 150–450)
RBC # BLD AUTO: 3.46 X10E6/UL (ref 3.77–5.28)
RPR SER QL: NON REACTIVE
WBC # BLD AUTO: 10.1 X10E3/UL (ref 3.4–10.8)

## 2023-02-10 RX ORDER — SODIUM CHLORIDE 9 MG/ML
20 INJECTION, SOLUTION INTRAVENOUS ONCE
OUTPATIENT
Start: 2023-03-06

## 2023-02-10 NOTE — PROGRESS NOTES
I ordered a new Therapy Plan (twice weekly)  I called the infusion center at [facility]: [phone], option #3 to confirm the order was received  I specified the location of the infusion in the Treatment Department field  I will instruct the patient to call and schedule her first infusion for the week of 3/5/2023  Ruslan arreguing to patient with number to call to schedule

## 2023-02-16 ENCOUNTER — TELEPHONE (OUTPATIENT)
Dept: OBGYN CLINIC | Facility: CLINIC | Age: 28
End: 2023-02-16

## 2023-02-16 NOTE — TELEPHONE ENCOUNTER
Pt is currently 28w1d GA  Pt left a message she tested positive for Covid and wanted to review what medication she may take with pregnancy  Left a message for patient to call back to further address

## 2023-02-16 NOTE — TELEPHONE ENCOUNTER
Angela Morrissey is c/o fever, sinus/nasal congestion, cough, headache, swollen neck lymph nodes and sore throat  She is taking extra strength Tylenol, drinking Gatorade, Pedialyte ice pops  Informed Angela Morrissey may take Tylenol or Sudafed sinus/cold medications, Flonase, Nasonex, or normal saline nasal sprays, maintain fluid intake, rest, Robitussin CF,DM or PE    Go to ED,if having any respiratory difficulties

## 2023-02-25 LAB
GLUCOSE 1H P GLC SERPL-MCNC: 159 MG/DL
GLUCOSE 2H P 75 G GLC PO SERPL-MCNC: 128 MG/DL

## 2023-02-26 LAB
FERRITIN SERPL-MCNC: 6 NG/ML (ref 15–150)
GLUCOSE 1H P 100 G GLC PO SERPL-MCNC: 159 MG/DL (ref 70–179)
GLUCOSE 2H P 100 G GLC PO SERPL-MCNC: 128 MG/DL (ref 70–154)
GLUCOSE 3H P 100 G GLC PO SERPL-MCNC: 89 MG/DL (ref 70–139)
GLUCOSE P FAST SERPL-MCNC: 82 MG/DL (ref 70–94)
IRON SERPL-MCNC: 27 UG/DL (ref 27–159)
SL AMB NOTE:: NORMAL

## 2023-03-06 ENCOUNTER — PATIENT MESSAGE (OUTPATIENT)
Dept: OBGYN CLINIC | Facility: CLINIC | Age: 28
End: 2023-03-06

## 2023-03-06 ENCOUNTER — HOSPITAL ENCOUNTER (OUTPATIENT)
Dept: INFUSION CENTER | Facility: HOSPITAL | Age: 28
Discharge: HOME | End: 2023-03-06
Attending: OBSTETRICS & GYNECOLOGY

## 2023-03-06 ENCOUNTER — ROUTINE PRENATAL (OUTPATIENT)
Dept: OBGYN CLINIC | Facility: CLINIC | Age: 28
End: 2023-03-06

## 2023-03-06 VITALS — DIASTOLIC BLOOD PRESSURE: 52 MMHG | SYSTOLIC BLOOD PRESSURE: 94 MMHG | BODY MASS INDEX: 22.99 KG/M2 | WEIGHT: 110 LBS

## 2023-03-06 VITALS
TEMPERATURE: 99 F | OXYGEN SATURATION: 97 % | RESPIRATION RATE: 16 BRPM | SYSTOLIC BLOOD PRESSURE: 106 MMHG | HEART RATE: 62 BPM | DIASTOLIC BLOOD PRESSURE: 63 MMHG

## 2023-03-06 DIAGNOSIS — O99.810 ABNORMAL GLUCOSE AFFECTING PREGNANCY: ICD-10-CM

## 2023-03-06 DIAGNOSIS — O99.019 ANEMIA AFFECTING PREGNANCY, ANTEPARTUM: ICD-10-CM

## 2023-03-06 DIAGNOSIS — Z87.59 PREVIOUS BABY WITH FETAL GROWTH RESTRICTION: ICD-10-CM

## 2023-03-06 DIAGNOSIS — O09.899 SHORT INTERVAL BETWEEN PREGNANCIES AFFECTING PREGNANCY, ANTEPARTUM: ICD-10-CM

## 2023-03-06 DIAGNOSIS — Z23 NEED FOR TDAP VACCINATION: ICD-10-CM

## 2023-03-06 DIAGNOSIS — Z3A.30 30 WEEKS GESTATION OF PREGNANCY: ICD-10-CM

## 2023-03-06 DIAGNOSIS — Z83.49 FAMILY HISTORY OF G6PD: ICD-10-CM

## 2023-03-06 DIAGNOSIS — O34.211 MATERNAL CARE DUE TO LOW TRANSVERSE UTERINE SCAR FROM PREVIOUS CESAREAN DELIVERY: Primary | ICD-10-CM

## 2023-03-06 DIAGNOSIS — O99.019 ANEMIA AFFECTING PREGNANCY, ANTEPARTUM: Primary | ICD-10-CM

## 2023-03-06 LAB
SL AMB  POCT GLUCOSE, UA: NEGATIVE
SL AMB POCT URINE PROTEIN: NEGATIVE

## 2023-03-06 RX ORDER — SODIUM CHLORIDE 9 MG/ML
20 INJECTION, SOLUTION INTRAVENOUS ONCE
Status: COMPLETED | OUTPATIENT
Start: 2023-03-06 | End: 2023-03-06

## 2023-03-06 RX ORDER — SODIUM CHLORIDE 9 MG/ML
20 INJECTION, SOLUTION INTRAVENOUS ONCE
Status: CANCELLED | OUTPATIENT
Start: 2023-03-09

## 2023-03-06 RX ADMIN — SODIUM CHLORIDE 20 ML/HR: 9 INJECTION, SOLUTION INTRAVENOUS at 12:29

## 2023-03-06 RX ADMIN — IRON SUCROSE 100 MG: 20 INJECTION, SOLUTION INTRAVENOUS at 12:29

## 2023-03-06 NOTE — ASSESSMENT & PLAN NOTE
- PTL/PPROM/Bleeding precautions given  Kick counts reviewed  - Third trimester labs reviewed  - Recommendation for administration of TDaP was reviewed  TDaP administered today  - Problem list updated, results console reviewed and updated with pertinent prenatal labs    - PMH, PSH, medications reviewed and updated as needed  - Return to office in 2 wk(s) for routine prenatal care

## 2023-03-06 NOTE — PROGRESS NOTES
Routine Prenatal Visit   [address], [address] [facility]    Assessment/Plan:  Vishnu Brewster is a 28y o  year old [id number] at 30w5d who presents for routine prenatal visit  1  Maternal care due to low transverse uterine scar from previous  delivery  Assessment & Plan:  - Scheduled for repeat c/s at 39 weeks  2  Abnormal glucose affecting pregnancy    3  Anemia affecting pregnancy, antepartum  Assessment & Plan:  - Scheduled for IV Venofer  First of five infusions completed today  4  Short interval between pregnancies affecting pregnancy, antepartum    5  30 weeks gestation of pregnancy  Assessment & Plan:  - PTL/PPROM/Bleeding precautions given  Kick counts reviewed  - Third trimester labs reviewed  - Recommendation for administration of TDaP was reviewed  TDaP administered today  - Problem list updated, results console reviewed and updated with pertinent prenatal labs  - PMH, PSH, medications reviewed and updated as needed  - Return to office in 2 wk(s) for routine prenatal care      Orders:  -     POCT urine dip    6  Previous baby with fetal growth restriction  Assessment & Plan:  - Serial growth US scheduled  7  Need for Tdap vaccination  -     tetanus-diphtheria-acellular pertussis (ADACEL) IM injection 0 5 mL    8  Family history of G6PD          Subjective:   Dmitriy Velazco is a 28 y o  [id number] who presents for routine prenatal care at 30w5d  Complaints today: No  LOF: No; VB: No; Contractions: No; FM: Present and normal    Objective:  BP 94/52   Wt 49 9 kg (110 lb)   LMP 2022 (Exact Date)   BMI 22 99 kg/m²     General: Well appearing, no distress  Respiratory: Unlabored breathing  Cardiovascular: Regular rate  Abdomen: Soft, gravid, nontender  Extremities: Warm and well perfused  Non tender      Pregravid Weight/BMI: 39 9 kg (88 lb) (BMI 18 40)  Current Weight: 49 9 kg (110 lb)   Total Weight Gain: 9 979 kg (22 lb)     Pre-Susan Vitals    Flowsheet Row Most Recent Value   Prenatal Assessment    Fetal Heart Rate 145   Fundal Height (cm) 29 cm   Movement Present   Prenatal Vitals    Blood Pressure 94/52   Weight - Scale 49 9 kg (110 lb)   Urine Albumin/Glucose    Dilation/Effacement/Station    Vaginal Drainage    Draining Fluid No   Edema    LLE Edema None   RLE Edema None   Facial Edema None           Monty Bernstein MD  3/6/2023 2:55 PM

## 2023-03-06 NOTE — PROGRESS NOTES
Pt here for venofer tolerated well no adverse reactions declined AVS and left ambulatory with steady gait 
Patient/Caregiver provided printed discharge information.

## 2023-03-07 ENCOUNTER — TELEPHONE (OUTPATIENT)
Dept: OBGYN CLINIC | Facility: CLINIC | Age: 28
End: 2023-03-07

## 2023-03-07 ENCOUNTER — HOSPITAL ENCOUNTER (OUTPATIENT)
Facility: HOSPITAL | Age: 28
Discharge: HOME | End: 2023-03-07
Attending: STUDENT IN AN ORGANIZED HEALTH CARE EDUCATION/TRAINING PROGRAM | Admitting: STUDENT IN AN ORGANIZED HEALTH CARE EDUCATION/TRAINING PROGRAM

## 2023-03-07 VITALS
RESPIRATION RATE: 18 BRPM | TEMPERATURE: 100.1 F | OXYGEN SATURATION: 100 % | SYSTOLIC BLOOD PRESSURE: 113 MMHG | DIASTOLIC BLOOD PRESSURE: 67 MMHG | HEART RATE: 92 BPM

## 2023-03-07 DIAGNOSIS — O47.03 THREATENED PRETERM LABOR, THIRD TRIMESTER: Primary | ICD-10-CM

## 2023-03-07 PROBLEM — O23.13 ACUTE CYSTITIS DURING PREGNANCY IN THIRD TRIMESTER: Status: ACTIVE | Noted: 2023-03-07

## 2023-03-07 LAB
ABO GROUP BLD: NORMAL
ALBUMIN SERPL BCP-MCNC: 3.1 G/DL (ref 3.5–5)
ALP SERPL-CCNC: 91 U/L (ref 34–104)
ALT SERPL W P-5'-P-CCNC: 7 U/L (ref 7–52)
ANION GAP SERPL CALCULATED.3IONS-SCNC: 8 MMOL/L (ref 4–13)
AST SERPL W P-5'-P-CCNC: 12 U/L (ref 13–39)
BACTERIA UR QL AUTO: ABNORMAL /HPF
BASOPHILS # BLD AUTO: 0.02 THOUSANDS/ÂΜL (ref 0–0.1)
BASOPHILS NFR BLD AUTO: 0 % (ref 0–1)
BILIRUB SERPL-MCNC: 0.4 MG/DL (ref 0.2–1)
BILIRUB UR QL STRIP: NEGATIVE
BLD GP AB SCN SERPL QL: NEGATIVE
BUN SERPL-MCNC: 4 MG/DL (ref 5–25)
CALCIUM ALBUM COR SERPL-MCNC: 8.7 MG/DL (ref 8.3–10.1)
CALCIUM SERPL-MCNC: 8 MG/DL (ref 8.4–10.2)
CHLORIDE SERPL-SCNC: 107 MMOL/L (ref 96–108)
CLARITY UR: CLEAR
CO2 SERPL-SCNC: 20 MMOL/L (ref 21–32)
COLOR UR: YELLOW
CREAT SERPL-MCNC: 0.42 MG/DL (ref 0.6–1.3)
EOSINOPHIL # BLD AUTO: 0.03 THOUSAND/ÂΜL (ref 0–0.61)
EOSINOPHIL NFR BLD AUTO: 0 % (ref 0–6)
ERYTHROCYTE [DISTWIDTH] IN BLOOD BY AUTOMATED COUNT: 13.2 % (ref 11.6–15.1)
FIBRINOGEN PPP-MCNC: 539 MG/DL (ref 227–495)
FIBRONECTIN FETAL VAG QL: NEGATIVE
GFR SERPL CREATININE-BSD FRML MDRD: 139 ML/MIN/1.73SQ M
GLUCOSE SERPL-MCNC: 104 MG/DL (ref 65–140)
GLUCOSE UR STRIP-MCNC: ABNORMAL MG/DL
HCT VFR BLD AUTO: 26.7 % (ref 34.8–46.1)
HGB BLD-MCNC: 8.3 G/DL (ref 11.5–15.4)
HGB UR QL STRIP.AUTO: NEGATIVE
IMM GRANULOCYTES # BLD AUTO: 0.23 THOUSAND/UL (ref 0–0.2)
IMM GRANULOCYTES NFR BLD AUTO: 2 % (ref 0–2)
KETONES UR STRIP-MCNC: ABNORMAL MG/DL
LEUKOCYTE ESTERASE UR QL STRIP: NEGATIVE
LYMPHOCYTES # BLD AUTO: 1.15 THOUSANDS/ÂΜL (ref 0.6–4.47)
LYMPHOCYTES NFR BLD AUTO: 12 % (ref 14–44)
MCH RBC QN AUTO: 25.2 PG (ref 26.8–34.3)
MCHC RBC AUTO-ENTMCNC: 31.1 G/DL (ref 31.4–37.4)
MCV RBC AUTO: 81 FL (ref 82–98)
MONOCYTES # BLD AUTO: 0.54 THOUSAND/ÂΜL (ref 0.17–1.22)
MONOCYTES NFR BLD AUTO: 6 % (ref 4–12)
MUCOUS THREADS UR QL AUTO: ABNORMAL
NEUTROPHILS # BLD AUTO: 7.68 THOUSANDS/ÂΜL (ref 1.85–7.62)
NEUTS SEG NFR BLD AUTO: 80 % (ref 43–75)
NITRITE UR QL STRIP: NEGATIVE
NON-SQ EPI CELLS URNS QL MICRO: ABNORMAL /HPF
NRBC BLD AUTO-RTO: 0 /100 WBCS
PH UR STRIP.AUTO: 6.5 [PH]
PLATELET # BLD AUTO: 180 THOUSANDS/UL (ref 149–390)
PMV BLD AUTO: 11.7 FL (ref 8.9–12.7)
POTASSIUM SERPL-SCNC: 3.2 MMOL/L (ref 3.5–5.3)
PROT SERPL-MCNC: 6.2 G/DL (ref 6.4–8.4)
PROT UR STRIP-MCNC: ABNORMAL MG/DL
RBC # BLD AUTO: 3.3 MILLION/UL (ref 3.81–5.12)
RBC #/AREA URNS AUTO: ABNORMAL /HPF
RENAL EPI CELLS #/AREA URNS HPF: PRESENT /[HPF]
RH BLD: POSITIVE
SODIUM SERPL-SCNC: 135 MMOL/L (ref 135–147)
SP GR UR STRIP.AUTO: 1.02 (ref 1–1.03)
SPECIMEN EXPIRATION DATE: NORMAL
UROBILINOGEN UR STRIP-ACNC: 4 MG/DL
WBC # BLD AUTO: 9.65 THOUSAND/UL (ref 4.31–10.16)
WBC #/AREA URNS AUTO: ABNORMAL /HPF

## 2023-03-07 RX ORDER — CEPHALEXIN 250 MG/1
500 CAPSULE ORAL EVERY 6 HOURS
Status: DISCONTINUED | OUTPATIENT
Start: 2023-03-07 | End: 2023-03-07 | Stop reason: HOSPADM

## 2023-03-07 RX ORDER — ACETAMINOPHEN 325 MG/1
650 TABLET ORAL ONCE
Status: COMPLETED | OUTPATIENT
Start: 2023-03-07 | End: 2023-03-07

## 2023-03-07 RX ORDER — DIPHENHYDRAMINE HCL 25 MG
25 TABLET ORAL EVERY 6 HOURS PRN
Status: DISCONTINUED | OUTPATIENT
Start: 2023-03-07 | End: 2023-03-07 | Stop reason: HOSPADM

## 2023-03-07 RX ORDER — METOCLOPRAMIDE HYDROCHLORIDE 5 MG/ML
10 INJECTION INTRAMUSCULAR; INTRAVENOUS ONCE
Status: COMPLETED | OUTPATIENT
Start: 2023-03-07 | End: 2023-03-07

## 2023-03-07 RX ORDER — SODIUM CHLORIDE, SODIUM LACTATE, POTASSIUM CHLORIDE, CALCIUM CHLORIDE 600; 310; 30; 20 MG/100ML; MG/100ML; MG/100ML; MG/100ML
125 INJECTION, SOLUTION INTRAVENOUS CONTINUOUS
Status: DISCONTINUED | OUTPATIENT
Start: 2023-03-07 | End: 2023-03-07 | Stop reason: HOSPADM

## 2023-03-07 RX ORDER — CEPHALEXIN 500 MG/1
500 CAPSULE ORAL EVERY 6 HOURS
Qty: 7 CAPSULE | Refills: 0 | Status: SHIPPED | OUTPATIENT
Start: 2023-03-07 | End: 2023-03-09

## 2023-03-07 RX ADMIN — ACETAMINOPHEN 650 MG: 325 TABLET, FILM COATED ORAL at 13:38

## 2023-03-07 RX ADMIN — METOCLOPRAMIDE 10 MG: 5 INJECTION, SOLUTION INTRAMUSCULAR; INTRAVENOUS at 15:35

## 2023-03-07 RX ADMIN — SODIUM CHLORIDE, SODIUM LACTATE, POTASSIUM CHLORIDE, AND CALCIUM CHLORIDE 125 ML/HR: .6; .31; .03; .02 INJECTION, SOLUTION INTRAVENOUS at 13:21

## 2023-03-07 RX ADMIN — SODIUM CHLORIDE, SODIUM LACTATE, POTASSIUM CHLORIDE, AND CALCIUM CHLORIDE 1000 ML: .6; .31; .03; .02 INJECTION, SOLUTION INTRAVENOUS at 11:58

## 2023-03-07 RX ADMIN — CEPHALEXIN 500 MG: 250 CAPSULE ORAL at 15:48

## 2023-03-07 NOTE — PROGRESS NOTES
Ob Antepartum Progress Note  Jenae Cortez 28 y o  female MRN: [id number]  Unit/Bed#: -01 Encounter: [id number]    A/P   28 y o  [id number] at 26 8 weeks gestational age, with abdominal pain,  contractions  (1)  contractions  Unlikely PTL, with CL > 3cm, cervix closed on repeat exam, but contractions seen on toco and felt  --> FFN sent  (2) Abdominal pain  May be an element of cystitis contributing  Could be part of a viral syndrome - has low-grade temp, headache, nasal congestion   --> Check UCx   --> Will treat empirically with cephalexin  (2) Fetal status reassuring  Reactive NST, normal FREDDY, BPP 10/10     --> May discontinue fetal monitoring  Mary Barton MD  23  ---------------------------------------------------------------------------------  Subjective/    Cramps somewhat lessened, still present  No VB, no LoF   (+)FM  Has bilateral frontal headache, 6/10, no associated neurologic symptoms  Reports ill contacts at her [facility]  Objective/  Blood pressure 113/67, pulse 92, temperature 100 1 °F (37 8 °C), temperature source Tympanic, resp  rate 18, last menstrual period 2022, SpO2 100 %, unknown if currently breastfeeding  Intake/Output Summary (Last 24 hours) at 3/7/2023 1444  Last data filed at 3/7/2023 1321  Gross per 24 hour   Intake 1000 ml   Output --   Net 1000 ml         Physical Exam/      General:  Alert, comfortable, NAD    Abdomen: Soft, non-tender, non-distended      Fundus: Size c/w dates, nontender  No tetany  No contractions palpated  Extremities: Warm, non-tender      Cervix: Closed/long/high, firm/posterior      Neck supple, full range of motion, nontender      Neuro:  Alert, appropriate affect        Normal speech        CN 2-12 intact/symmetric        DTR 2+/symmetric        Muscle strength 5/5 symmetric       moderate variability (+) accels, no decels      [place] Irregular      Labs/      A+/-        WBC 9 65      Hgb 8 3 (9 1 on 2/9/23)      Platelets 543      Fibrinogen 539           BUN/Cr  4/0 42      AST/ALT  12/7        UA (+)gluc, ket, pro    Negative LE/Nitrite, but moderate bacteria

## 2023-03-07 NOTE — PATIENT COMMUNICATION
Discussed with patient in person during triage visit at [facility]  Albumin flagged as "abnormal," but normal for pregnancy       Alverto Cui MD  3/7/2023 12:30 PM

## 2023-03-07 NOTE — TELEPHONE ENCOUNTER
christine called emergency line stating she has been having contractions every 2 minutes lasting 1-1 1/2 min  for the past 1 1/2 hour apart  Started with contractions last night that she thought were pierre ocasio  This morning they have been getting stronger and is now  Uncomfortable  +FM, denies LOF, bleeding or spotting  She is currently being transported to  LD by her friend  Will arrive within 10 min  Dr Devan Hays notified
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
## 2023-03-07 NOTE — PROGRESS NOTES
Update:    FFN Negative    A/P   28 y o  [id number] at 30 6 weeks gestational age, withviral syndrome versus UTI  (1)  contractions  Unlikely PTL, with CL > 3cm, cervix closed on repeat exam, FFN Negative  --> Precautions      (2) Abdominal pain  Acute cystitis versus viral syndrome  UA with bacteria  Also has resp symptoms and ill contacts  --> Will treat empirically with cephalexin   --> Check UCx   --> Precs      (2) Fetal status reassuring    Reactive NST, normal FREDDY, BPP 10/10     --> Fetal kick counts      Kaushal Dsouza MD  23

## 2023-03-08 DIAGNOSIS — O99.019 ANEMIA AFFECTING PREGNANCY, ANTEPARTUM: Primary | ICD-10-CM

## 2023-03-08 LAB — BACTERIA UR CULT: ABNORMAL

## 2023-03-09 ENCOUNTER — HOSPITAL ENCOUNTER (OUTPATIENT)
Dept: INFUSION CENTER | Facility: HOSPITAL | Age: 28
Discharge: HOME | End: 2023-03-09
Attending: OBSTETRICS & GYNECOLOGY

## 2023-03-09 VITALS
RESPIRATION RATE: 16 BRPM | TEMPERATURE: 99.2 F | SYSTOLIC BLOOD PRESSURE: 93 MMHG | OXYGEN SATURATION: 100 % | DIASTOLIC BLOOD PRESSURE: 52 MMHG | HEART RATE: 89 BPM

## 2023-03-09 DIAGNOSIS — O99.019 ANEMIA AFFECTING PREGNANCY, ANTEPARTUM: Primary | ICD-10-CM

## 2023-03-09 RX ORDER — SODIUM CHLORIDE 9 MG/ML
20 INJECTION, SOLUTION INTRAVENOUS ONCE
Status: CANCELLED | OUTPATIENT
Start: 2023-03-13

## 2023-03-09 RX ORDER — SODIUM CHLORIDE 9 MG/ML
20 INJECTION, SOLUTION INTRAVENOUS ONCE
Status: COMPLETED | OUTPATIENT
Start: 2023-03-09 | End: 2023-03-09

## 2023-03-09 RX ADMIN — IRON SUCROSE 100 MG: 20 INJECTION, SOLUTION INTRAVENOUS at 14:55

## 2023-03-09 RX ADMIN — SODIUM CHLORIDE 20 ML/HR: 9 INJECTION, SOLUTION INTRAVENOUS at 14:56

## 2023-03-09 NOTE — PROGRESS NOTES
Patient completed venofer infusion with no adverse reactions  Declined AVS, left unit ambulatory with steady gait

## 2023-03-10 ENCOUNTER — TELEPHONE (OUTPATIENT)
Dept: LABOR AND DELIVERY | Facility: HOSPITAL | Age: 28
End: 2023-03-10

## 2023-03-10 ENCOUNTER — TELEPHONE (OUTPATIENT)
Dept: OBGYN CLINIC | Facility: CLINIC | Age: 28
End: 2023-03-10

## 2023-03-10 DIAGNOSIS — O23.13 ACUTE CYSTITIS DURING PREGNANCY IN THIRD TRIMESTER: Primary | ICD-10-CM

## 2023-03-10 PROBLEM — O23.40 GROUP B STREPTOCOCCUS URINARY TRACT INFECTION AFFECTING PREGNANCY: Status: ACTIVE | Noted: 2023-03-10

## 2023-03-10 PROBLEM — B95.1 GROUP B STREPTOCOCCUS URINARY TRACT INFECTION AFFECTING PREGNANCY: Status: ACTIVE | Noted: 2023-03-10

## 2023-03-10 RX ORDER — CEPHALEXIN 500 MG/1
500 CAPSULE ORAL EVERY 6 HOURS SCHEDULED
Qty: 24 CAPSULE | Refills: 0 | Status: SHIPPED | OUTPATIENT
Start: 2023-03-10 | End: 2023-03-16

## 2023-03-10 NOTE — TELEPHONE ENCOUNTER
Update:    Deanna Carbon called back  We discussed the positive UCx for GBS, and the need for intrapartum prophylaxis as well as completing treatment for UTI  She tells me she is almost done with her Keflex  I note that I prescribed her Keflex 500mg po qid x 7 *doses* rather than 7 *days "  I discussed this with her  New Rx called in to her pharmacy for 24 further doses  She is still having intermittent cramping  No VB/LoF  (+)FM  We discussed precautions      Selvin Martin MD

## 2023-03-10 NOTE — TELEPHONE ENCOUNTER
Update:     UCx resulted 10-19k GBS  Treated with cephalexin  Would like to advise on need for intrapartum GBS prophylaxis  Called   No answer; left message  Called Cassandra at [phone] and left a message with him as well      Ankur Rollins MD

## 2023-03-11 ENCOUNTER — HOSPITAL ENCOUNTER (OUTPATIENT)
Facility: HOSPITAL | Age: 28
Discharge: HOME | End: 2023-03-11
Attending: STUDENT IN AN ORGANIZED HEALTH CARE EDUCATION/TRAINING PROGRAM | Admitting: OBSTETRICS & GYNECOLOGY

## 2023-03-11 VITALS
HEART RATE: 89 BPM | HEIGHT: 58 IN | WEIGHT: 110 LBS | OXYGEN SATURATION: 98 % | TEMPERATURE: 99.9 F | DIASTOLIC BLOOD PRESSURE: 54 MMHG | BODY MASS INDEX: 23.09 KG/M2 | RESPIRATION RATE: 18 BRPM | SYSTOLIC BLOOD PRESSURE: 92 MMHG

## 2023-03-11 LAB
A1 MICROGLOB PLACENTAL VAG QL: NEGATIVE
ALBUMIN SERPL BCP-MCNC: 3.6 G/DL (ref 3.5–5)
ALP SERPL-CCNC: 101 U/L (ref 34–104)
ALT SERPL W P-5'-P-CCNC: 8 U/L (ref 7–52)
ANION GAP SERPL CALCULATED.3IONS-SCNC: 11 MMOL/L (ref 4–13)
AST SERPL W P-5'-P-CCNC: 13 U/L (ref 13–39)
BACTERIA UR QL AUTO: ABNORMAL /HPF
BILIRUB SERPL-MCNC: 0.46 MG/DL (ref 0.2–1)
BILIRUB UR QL STRIP: NEGATIVE
BUN SERPL-MCNC: 4 MG/DL (ref 5–25)
CALCIUM SERPL-MCNC: 8.7 MG/DL (ref 8.4–10.2)
CHLORIDE SERPL-SCNC: 107 MMOL/L (ref 96–108)
CLARITY UR: CLEAR
CO2 SERPL-SCNC: 18 MMOL/L (ref 21–32)
COLOR UR: YELLOW
CREAT SERPL-MCNC: 0.48 MG/DL (ref 0.6–1.3)
ERYTHROCYTE [DISTWIDTH] IN BLOOD BY AUTOMATED COUNT: 14.9 % (ref 11.6–15.1)
FIBRONECTIN FETAL VAG QL: NEGATIVE
GFR SERPL CREATININE-BSD FRML MDRD: 133 ML/MIN/1.73SQ M
GLUCOSE SERPL-MCNC: 81 MG/DL (ref 65–140)
GLUCOSE UR STRIP-MCNC: NEGATIVE MG/DL
HCT VFR BLD AUTO: 29.5 % (ref 34.8–46.1)
HGB BLD-MCNC: 9.3 G/DL (ref 11.5–15.4)
HGB UR QL STRIP.AUTO: NEGATIVE
KETONES UR STRIP-MCNC: ABNORMAL MG/DL
LEUKOCYTE ESTERASE UR QL STRIP: NEGATIVE
MCH RBC QN AUTO: 25.8 PG (ref 26.8–34.3)
MCHC RBC AUTO-ENTMCNC: 31.5 G/DL (ref 31.4–37.4)
MCV RBC AUTO: 82 FL (ref 82–98)
MUCOUS THREADS UR QL AUTO: ABNORMAL
NITRITE UR QL STRIP: NEGATIVE
NON-SQ EPI CELLS URNS QL MICRO: ABNORMAL /HPF
PH UR STRIP.AUTO: 6.5 [PH]
PLATELET # BLD AUTO: 217 THOUSANDS/UL (ref 149–390)
PMV BLD AUTO: 11.3 FL (ref 8.9–12.7)
POTASSIUM SERPL-SCNC: 3.2 MMOL/L (ref 3.5–5.3)
PROT SERPL-MCNC: 6.7 G/DL (ref 6.4–8.4)
PROT UR STRIP-MCNC: ABNORMAL MG/DL
RBC # BLD AUTO: 3.61 MILLION/UL (ref 3.81–5.12)
RBC #/AREA URNS AUTO: ABNORMAL /HPF
SODIUM SERPL-SCNC: 136 MMOL/L (ref 135–147)
SP GR UR STRIP.AUTO: 1.02 (ref 1–1.03)
UROBILINOGEN UR STRIP-ACNC: <2 MG/DL
WBC # BLD AUTO: 13.97 THOUSAND/UL (ref 4.31–10.16)
WBC #/AREA URNS AUTO: ABNORMAL /HPF

## 2023-03-11 RX ADMIN — SODIUM CHLORIDE, SODIUM LACTATE, POTASSIUM CHLORIDE, AND CALCIUM CHLORIDE 1000 ML: .6; .31; .03; .02 INJECTION, SOLUTION INTRAVENOUS at 22:16

## 2023-03-11 NOTE — TELEPHONE ENCOUNTER
Patient called stating she was evaluated on L&D on 3/7/23 for PTL and would like to review si/sx of labor  Patient asked if leakage of fluid needs to occur prior to start of labor? She has had irregular pierre hick and cramping which resolved spontaneously  Denies having leakage of fluid or vaginal bleeding  Informed patient that with labor the cervix would dilate with contractions regardless of whether she has ruptured membranes or not  Advised patient to rest and hydrate  Call Ob/Gyn if she has regular contractions or occurring at frequency of 6 an hour or has vaginal bleeding or has leakage of fluid  Informed patient that lOF, CTX or vaginal bleeding can each occur on their own and do not always occur at the same time  Advised patient to call if any of the above (LOF, vaginal bleeding or contractions) occur    Patient expressed understanding

## 2023-03-12 NOTE — PROGRESS NOTES
Update:   Full liter of LR now infused  Patient not feeling any further contractions  Irregular contractions on monitor  FHT cat I  Patient asking to go home     - FFN still pending but given cervix closed and long and ctx have now stopped, I think okay to discharge  - reviewed  labor precautions and encouraged pt to return of she feels LOF again or contractions significantly worsening      Jayson Rhoades MD

## 2023-03-12 NOTE — PROGRESS NOTES
Triage Note - WINSOME Brown 28 y o  female MRN: [id number]  Unit/Bed#: LD TRIAGE  Encounter: [id number]    Chief Complaint: "I'm leaking fluid"  JENNIFER: Estimated Date of Delivery: 5/10/23    HPI: Patient is a [id number] at [address] here   She presents with c/o leaking of fluid over last hour or so and pelvic pressure  She states when she feels the pressure she has leaking  She can't say exactly when the leaking started  She has been having pressure off and on over the week  She was seen 3/7/2023 for evaluation for PTL and was ruled out, although she was diagnosed with UTI and started on Keflex, which she is still taking  She denies vaginal bleeding  Baby is moving well  She insists she is drinking "a lot of water" every day   "5-6 bottles"  She denies N/V, URI symptoms, or urinary sx   Problems (from 10/07/22 to present)     Problem Noted Resolved    Group B streptococcus bacteriuria affecting pregnancy 3/10/2023 by Alphonso Reeder MD No    Previous baby with fetal growth restriction 3/6/2023 by Alphonso Reeder MD No    Abnormal glucose affecting pregnancy 2/10/2023 by Rudy Sellers MD No    Overview Addendum 2023  6:41 PM by Rudy Sellers MD     28 week 1hr  -> 3hr GTT all values normal         30 weeks gestation of pregnancy 2022 by Candace Zavaleta MD No    Overview Addendum 3/6/2023  2:52 PM by Alphonso Reeder MD     Flu vaccine 2023  TDaP 3/6/2023         Family history of G6PD 10/7/2022 by Alphonso Reeder MD No    Overview Signed 10/7/2022 12:41 PM by Alphonso Reeder MD     Daughter and sister with G6PD deficiency  Short interval between pregnancies affecting pregnancy, antepartum 10/7/2022 by Alphonso Reeder MD No    Overview Addendum 2023  2:29 PM by Rudy Sellers MD     Reviewed risk of growth restriction,  delivery, anemia, as well as concern for increased risk of uterine rupture given inter-pregnancy interval of 6 months  Growth u/s scheduled 32 weeks         Anemia affecting pregnancy, antepartum  by Judith Benson MD No    Overview Addendum 2023  6:42 PM by Lolita Mauro MD     Required IV iron infusions in second pregnancy  Initiation of oral iron supplementation recommended at initial prenatal visit  28 weeks hgb 9 1g/dl (from 12 0 first trimester) - pt states she is taking po iron most days  Likely not absorbing it well  2023 Ferritin and Iron both low    [  ] IV Venofer ordered at [facility], pt to call to schedule to start at around 30 weeks  History of  section x 2 1/10/2022 by Carolina Maya MD No    Overview Signed 10/7/2022  1:04 PM by Judith Benson MD     Plan for repeat          Maternal care due to low transverse uterine scar from previous  delivery 2021 by Nelson Nagy MD No    Overview Addendum 2023  2:28 PM by Lolita Mauro MD     History  x 2  Will plan repeat LTCS         Abnormal antibody titer 10/28/2022 by Judith Benson MD 2022 by Judith Benson MD    Overview Addendum 2022 11:24 AM by Judith Benson MD     Antibody screen on initial prenatal panel showed "nonspecific reactivity in the antiglobulin phase of testing  Additional testing did not identify a clinically significant antibody  It is possible that the antibody is too weak to identify  We recommend repeat testing in 4 weeks "    Repeat antibody screen at 20 weeks negative  No further testing indicated  Vitals:   Pulse 89   Temp 99 9 °F (37 7 °C) (Temporal)   Resp 18   Ht 4' 10" (1 473 m)   Wt 49 9 kg (110 lb)   LMP 2022 (Exact Date)   SpO2 98%   BMI 22 99 kg/m²   Body mass index is 22 99 kg/m²      Physical Exam  GEN: uncomfortable    ABD: S, NT, ND  Fundus: NT  SVE:   negative nitrizine, pooling or ferning  Cervix: C/L/high    FHT:  FHT baseline: 130bpm  Variability: moderate  Accels: present  Decels: Absent  Category 1    TOCO: initially q2-3 minutes but spaced out to irregular       Labs:   Recent Results (from the past 24 hour(s))   Placental Alpha-1 Microglobulin    Collection Time: 03/11/23 10:12 PM   Result Value Ref Range    PLACENTAL ALPHA-1 MICROGLOBULIN Negative    CBC and Platelet    Collection Time: 03/11/23 10:12 PM   Result Value Ref Range    WBC 13 97 (H) 4 31 - 10 16 Thousand/uL    RBC 3 61 (L) 3 81 - 5 12 Million/uL    Hemoglobin 9 3 (L) 11 5 - 15 4 g/dL    Hematocrit 29 5 (L) 34 8 - 46 1 %    MCV 82 82 - 98 fL    MCH 25 8 (L) 26 8 - 34 3 pg    MCHC 31 5 31 4 - 37 4 g/dL    RDW 14 9 11 6 - 15 1 %    Platelets 501 945 - 742 Thousands/uL    MPV 11 3 8 9 - 12 7 fL   Comprehensive metabolic panel    Collection Time: 03/11/23 10:12 PM   Result Value Ref Range    Sodium 136 135 - 147 mmol/L    Potassium 3 2 (L) 3 5 - 5 3 mmol/L    Chloride 107 96 - 108 mmol/L    CO2 18 (L) 21 - 32 mmol/L    ANION GAP 11 4 - 13 mmol/L    BUN 4 (L) 5 - 25 mg/dL    Creatinine 0 48 (L) 0 60 - 1 30 mg/dL    Glucose 81 65 - 140 mg/dL    Calcium 8 7 8 4 - 10 2 mg/dL    AST 13 13 - 39 U/L    ALT 8 7 - 52 U/L    Alkaline Phosphatase 101 34 - 104 U/L    Total Protein 6 7 6 4 - 8 4 g/dL    Albumin 3 6 3 5 - 5 0 g/dL    Total Bilirubin 0 46 0 20 - 1 00 mg/dL    eGFR 133 ml/min/1 73sq m   Urinalysis with microscopic    Collection Time: 03/11/23 10:13 PM   Result Value Ref Range    Color, UA Yellow     Clarity, UA Clear     Specific Gravity, UA 1 020 1 005 - 1 030    pH, UA 6 5 4 5, 5 0, 5 5, 6 0, 6 5, 7 0, 7 5, 8 0    Leukocytes, UA Negative Negative    Nitrite, UA Negative Negative    Protein, UA Trace (A) Negative mg/dl    Glucose, UA Negative Negative mg/dl    Ketones, UA Trace (A) Negative mg/dl    Urobilinogen, UA <2 0 <2 0 mg/dl mg/dl    Bilirubin, UA Negative Negative    Occult Blood, UA Negative Negative    RBC, UA None Seen None Seen, 2-4 /hpf    WBC, UA 0-1 (A) None Seen, 2-4, 5-60 /hpf    Epithelial Cells Occasional None Seen, Occasional /hpf    Bacteria, UA Occasional None Seen, Occasional /hpf    MUCUS THREADS Occasional (A) None Seen       Imaging: TVUS: cervical length >3cm      A/P: [id number] at 31w3d presents with complaint of leaking of fluid and contractions  1) Leaking of fluid - speculum exam show negative nitrizine, pooling and ferning  Amnisure test negative  - pt being treated for UTI  States she noticed leaking with contractions  Likely urine leaking    - no further leaking since arrival     2) Contractions - pt with contractions every couple minutes on initial evaluation    - IVF started  - cervix >3cm on u/s and closed on exam    - FFN pending, but was done on 3/7/2023 when pt evaluated for contractions and was negative at that time      3) UTI - started antibiotics for UTI when seen 3/7/2023    - urine culture returned GBS and patient was instructed to complete antibiotics, she is on day 5 of 7        Du Duncan MD

## 2023-03-13 ENCOUNTER — HOSPITAL ENCOUNTER (OUTPATIENT)
Dept: INFUSION CENTER | Facility: HOSPITAL | Age: 28
Discharge: HOME | End: 2023-03-13
Attending: OBSTETRICS & GYNECOLOGY

## 2023-03-13 VITALS
RESPIRATION RATE: 16 BRPM | HEART RATE: 80 BPM | DIASTOLIC BLOOD PRESSURE: 58 MMHG | TEMPERATURE: 97.9 F | SYSTOLIC BLOOD PRESSURE: 90 MMHG | OXYGEN SATURATION: 100 %

## 2023-03-13 DIAGNOSIS — O99.019 ANEMIA AFFECTING PREGNANCY, ANTEPARTUM: Primary | ICD-10-CM

## 2023-03-13 RX ORDER — SODIUM CHLORIDE 9 MG/ML
20 INJECTION, SOLUTION INTRAVENOUS ONCE
Status: CANCELLED | OUTPATIENT
Start: 2023-03-16

## 2023-03-13 RX ORDER — SODIUM CHLORIDE 9 MG/ML
20 INJECTION, SOLUTION INTRAVENOUS ONCE
Status: COMPLETED | OUTPATIENT
Start: 2023-03-13 | End: 2023-03-13

## 2023-03-13 RX ADMIN — SODIUM CHLORIDE 20 ML/HR: 9 INJECTION, SOLUTION INTRAVENOUS at 15:08

## 2023-03-13 RX ADMIN — IRON SUCROSE 100 MG: 20 INJECTION, SOLUTION INTRAVENOUS at 15:08

## 2023-03-13 NOTE — PROGRESS NOTES
Pt tolerated venofer infusion with no adverse reactions  Pt left ambulatory with steady gait  Refused AVS

## 2023-03-15 ENCOUNTER — ULTRASOUND (OUTPATIENT)
Dept: PERINATAL CARE | Facility: OTHER | Age: 28
End: 2023-03-15

## 2023-03-15 ENCOUNTER — TELEPHONE (OUTPATIENT)
Facility: HOSPITAL | Age: 28
End: 2023-03-15

## 2023-03-15 VITALS
WEIGHT: 112.2 LBS | DIASTOLIC BLOOD PRESSURE: 54 MMHG | SYSTOLIC BLOOD PRESSURE: 94 MMHG | HEIGHT: 58 IN | BODY MASS INDEX: 23.55 KG/M2 | HEART RATE: 66 BPM

## 2023-03-15 DIAGNOSIS — O34.211 MATERNAL CARE DUE TO LOW TRANSVERSE UTERINE SCAR FROM PREVIOUS CESAREAN DELIVERY: Primary | ICD-10-CM

## 2023-03-15 DIAGNOSIS — O09.899 SHORT INTERVAL BETWEEN PREGNANCIES AFFECTING PREGNANCY, ANTEPARTUM: ICD-10-CM

## 2023-03-15 DIAGNOSIS — Z36.89 ENCOUNTER FOR ULTRASOUND TO ASSESS FETAL GROWTH: ICD-10-CM

## 2023-03-15 DIAGNOSIS — Z87.59 HISTORY OF PRIOR PREGNANCY WITH IUGR NEWBORN: ICD-10-CM

## 2023-03-15 DIAGNOSIS — Z3A.32 32 WEEKS GESTATION OF PREGNANCY: ICD-10-CM

## 2023-03-15 PROBLEM — Z36.82 NUCHAL TRANSLUCENCY OF FETUS ON PRENATAL ULTRASOUND: Status: RESOLVED | Noted: 2022-11-16 | Resolved: 2023-03-15

## 2023-03-15 NOTE — TELEPHONE ENCOUNTER
Spoke with PT 3/15 at 3:36 pm regarding her 3:30 pm appt today at [facility]  PT stated she is going to be late, she is unsure of how late  I confirmed late policy with PT, and she verbalized understanding and said she was going to try to make it to appt

## 2023-03-15 NOTE — PATIENT INSTRUCTIONS
Thank you for choosing us for your  care today  If you have any questions about your ultrasound or care, please do not hesitate to contact us or your primary obstetrician  Some general instructions for your pregnancy are:    Exercise: Aim for 22 minutes per day (150 minutes per week) of regular exercise  Walking is great! Nutrition: Choose healthy sources of calcium, iron, and protein  Learn about Preeclampsia: preeclampsia is a common, serious high blood pressure complication in pregnancy  A blood pressure of 385OBIG (systolic or top number) or 18HEZI (diastolic or bottom number) is not normal and needs evaluation by your doctor  Aspirin is sometimes prescribed in early pregnancy to prevent preeclampsia in women with risk factors - ask your obstetrician if you should be on this medication  For more resources, visit:  Map[employer]age fi  If you smoke, try to reduce how many cigarettes you smoke or try to quit completely  Do not vape  Other warning signs to watch out for in pregnancy or postpartum: chest pain, obstructed breathing or shortness of breath, seizures, thoughts of hurting yourself or your baby, bleeding, a painful or swollen leg, fever, or headache (see [facility] POST-BIRTH Warning Signs campaign)  If these happen call 911  Itching is also not normal in pregnancy and if you experience this, especially over your hands and feet, potentially worse at night, notify your doctors

## 2023-03-15 NOTE — PROGRESS NOTES
[id number] [facility]: Ms Carolina Nevarez was seen today for fetal growth assessment ultrasound  See ultrasound report under "OB Procedures" tab  The time spent on this established patient on the encounter date included 5 minutes previsit service time reviewing records and precharting, 5 minutes face-to-face service time counseling regarding results and coordinating care, and  4 minutes charting, totalling 14 minutes  Please don't hesitate to contact our office with any concerns or questions    Chris Lugo MD

## 2023-03-16 ENCOUNTER — HOSPITAL ENCOUNTER (OUTPATIENT)
Dept: INFUSION CENTER | Facility: HOSPITAL | Age: 28
Discharge: HOME | End: 2023-03-16
Attending: OBSTETRICS & GYNECOLOGY

## 2023-03-16 VITALS
SYSTOLIC BLOOD PRESSURE: 114 MMHG | DIASTOLIC BLOOD PRESSURE: 74 MMHG | OXYGEN SATURATION: 98 % | RESPIRATION RATE: 16 BRPM | HEART RATE: 89 BPM | TEMPERATURE: 98.8 F

## 2023-03-16 DIAGNOSIS — O99.019 ANEMIA AFFECTING PREGNANCY, ANTEPARTUM: Primary | ICD-10-CM

## 2023-03-16 RX ORDER — SODIUM CHLORIDE 9 MG/ML
20 INJECTION, SOLUTION INTRAVENOUS ONCE
Status: COMPLETED | OUTPATIENT
Start: 2023-03-16 | End: 2023-03-16

## 2023-03-16 RX ORDER — SODIUM CHLORIDE 9 MG/ML
20 INJECTION, SOLUTION INTRAVENOUS ONCE
Status: CANCELLED | OUTPATIENT
Start: 2023-03-20

## 2023-03-16 RX ADMIN — SODIUM CHLORIDE 20 ML/HR: 9 INJECTION, SOLUTION INTRAVENOUS at 14:53

## 2023-03-16 RX ADMIN — IRON SUCROSE 100 MG: 20 INJECTION, SOLUTION INTRAVENOUS at 15:00

## 2023-03-20 ENCOUNTER — HOSPITAL ENCOUNTER (OUTPATIENT)
Dept: INFUSION CENTER | Facility: HOSPITAL | Age: 28
Discharge: HOME | End: 2023-03-20
Attending: OBSTETRICS & GYNECOLOGY

## 2023-03-20 VITALS
DIASTOLIC BLOOD PRESSURE: 55 MMHG | SYSTOLIC BLOOD PRESSURE: 112 MMHG | OXYGEN SATURATION: 100 % | RESPIRATION RATE: 16 BRPM | HEART RATE: 87 BPM | TEMPERATURE: 98.8 F

## 2023-03-20 DIAGNOSIS — O99.019 ANEMIA AFFECTING PREGNANCY, ANTEPARTUM: Primary | ICD-10-CM

## 2023-03-20 RX ORDER — SODIUM CHLORIDE 9 MG/ML
20 INJECTION, SOLUTION INTRAVENOUS ONCE
Status: COMPLETED | OUTPATIENT
Start: 2023-03-20 | End: 2023-03-20

## 2023-03-20 RX ORDER — SODIUM CHLORIDE 9 MG/ML
20 INJECTION, SOLUTION INTRAVENOUS ONCE
Status: CANCELLED | OUTPATIENT
Start: 2023-03-20

## 2023-03-20 RX ADMIN — SODIUM CHLORIDE 20 ML/HR: 9 INJECTION, SOLUTION INTRAVENOUS at 14:59

## 2023-03-20 RX ADMIN — IRON SUCROSE 100 MG: 20 INJECTION, SOLUTION INTRAVENOUS at 15:11

## 2023-03-22 ENCOUNTER — HOSPITAL ENCOUNTER (OUTPATIENT)
Dept: NON INVASIVE DIAGNOSTICS | Facility: HOSPITAL | Age: 28
Discharge: HOME | End: 2023-03-22
Attending: STUDENT IN AN ORGANIZED HEALTH CARE EDUCATION/TRAINING PROGRAM

## 2023-03-22 ENCOUNTER — HOSPITAL ENCOUNTER (OUTPATIENT)
Facility: HOSPITAL | Age: 28
Discharge: HOME | End: 2023-03-22
Attending: STUDENT IN AN ORGANIZED HEALTH CARE EDUCATION/TRAINING PROGRAM | Admitting: STUDENT IN AN ORGANIZED HEALTH CARE EDUCATION/TRAINING PROGRAM

## 2023-03-22 ENCOUNTER — TELEPHONE (OUTPATIENT)
Dept: OBGYN CLINIC | Facility: CLINIC | Age: 28
End: 2023-03-22

## 2023-03-22 ENCOUNTER — PATIENT MESSAGE (OUTPATIENT)
Dept: OBGYN CLINIC | Facility: CLINIC | Age: 28
End: 2023-03-22

## 2023-03-22 VITALS — SYSTOLIC BLOOD PRESSURE: 115 MMHG | TEMPERATURE: 98 F | HEART RATE: 77 BPM | DIASTOLIC BLOOD PRESSURE: 56 MMHG

## 2023-03-22 DIAGNOSIS — M79.662 PAIN OF LEFT CALF: Primary | ICD-10-CM

## 2023-03-22 DIAGNOSIS — Z3A.33 33 WEEKS GESTATION OF PREGNANCY: Primary | ICD-10-CM

## 2023-03-22 DIAGNOSIS — Z3A.33 33 WEEKS GESTATION OF PREGNANCY: ICD-10-CM

## 2023-03-22 DIAGNOSIS — M79.662 PAIN OF LEFT CALF: ICD-10-CM

## 2023-03-22 PROBLEM — R10.2 PELVIC PAIN: Status: RESOLVED | Noted: 2022-06-28 | Resolved: 2023-03-22

## 2023-03-22 LAB
ALBUMIN SERPL BCP-MCNC: 3.2 G/DL (ref 3.5–5)
ALP SERPL-CCNC: 97 U/L (ref 34–104)
ALT SERPL W P-5'-P-CCNC: 8 U/L (ref 7–52)
ANION GAP SERPL CALCULATED.3IONS-SCNC: 8 MMOL/L (ref 4–13)
AST SERPL W P-5'-P-CCNC: 13 U/L (ref 13–39)
BILIRUB SERPL-MCNC: 0.4 MG/DL (ref 0.2–1)
BILIRUB UR QL STRIP: NEGATIVE
BUN SERPL-MCNC: 4 MG/DL (ref 5–25)
CALCIUM ALBUM COR SERPL-MCNC: 8.9 MG/DL (ref 8.3–10.1)
CALCIUM SERPL-MCNC: 8.3 MG/DL (ref 8.4–10.2)
CHLORIDE SERPL-SCNC: 109 MMOL/L (ref 96–108)
CLARITY UR: CLEAR
CO2 SERPL-SCNC: 19 MMOL/L (ref 21–32)
COLOR UR: COLORLESS
CREAT SERPL-MCNC: 0.46 MG/DL (ref 0.6–1.3)
ERYTHROCYTE [DISTWIDTH] IN BLOOD BY AUTOMATED COUNT: 19 % (ref 11.6–15.1)
GFR SERPL CREATININE-BSD FRML MDRD: 135 ML/MIN/1.73SQ M
GLUCOSE P FAST SERPL-MCNC: 81 MG/DL (ref 65–99)
GLUCOSE SERPL-MCNC: 81 MG/DL (ref 65–140)
GLUCOSE UR STRIP-MCNC: NEGATIVE MG/DL
HCT VFR BLD AUTO: 29.7 % (ref 34.8–46.1)
HGB BLD-MCNC: 9.1 G/DL (ref 11.5–15.4)
HGB UR QL STRIP.AUTO: NEGATIVE
KETONES UR STRIP-MCNC: ABNORMAL MG/DL
LEUKOCYTE ESTERASE UR QL STRIP: NEGATIVE
MCH RBC QN AUTO: 25.8 PG (ref 26.8–34.3)
MCHC RBC AUTO-ENTMCNC: 30.6 G/DL (ref 31.4–37.4)
MCV RBC AUTO: 84 FL (ref 82–98)
NITRITE UR QL STRIP: NEGATIVE
PH UR STRIP.AUTO: 7 [PH]
PLATELET # BLD AUTO: 188 THOUSANDS/UL (ref 149–390)
PMV BLD AUTO: 11.4 FL (ref 8.9–12.7)
POTASSIUM SERPL-SCNC: 3.4 MMOL/L (ref 3.5–5.3)
PROT SERPL-MCNC: 5.8 G/DL (ref 6.4–8.4)
PROT UR STRIP-MCNC: NEGATIVE MG/DL
RBC # BLD AUTO: 3.53 MILLION/UL (ref 3.81–5.12)
SODIUM SERPL-SCNC: 136 MMOL/L (ref 135–147)
SP GR UR STRIP.AUTO: <1.005 (ref 1–1.03)
UROBILINOGEN UR STRIP-ACNC: <2 MG/DL
WBC # BLD AUTO: 9.91 THOUSAND/UL (ref 4.31–10.16)

## 2023-03-22 RX ORDER — CYCLOBENZAPRINE HCL 5 MG
5 TABLET ORAL 3 TIMES DAILY PRN
Qty: 12 TABLET | Refills: 0 | Status: SHIPPED | OUTPATIENT
Start: 2023-03-22

## 2023-03-22 RX ORDER — CYCLOBENZAPRINE HCL 10 MG
5 TABLET ORAL ONCE
Status: COMPLETED | OUTPATIENT
Start: 2023-03-22 | End: 2023-03-22

## 2023-03-22 RX ADMIN — CYCLOBENZAPRINE HYDROCHLORIDE 5 MG: 10 TABLET, FILM COATED ORAL at 05:10

## 2023-03-22 NOTE — TELEPHONE ENCOUNTER
Patient called on-call line with report of contractions every 3-5 minutes  She is currently 33w0d with a history of 2 prior  deliveries  This is a short interval pregnancy  She reports that cramping started around 8:00 pm and has continued all night, keeping her from sleeping  She reports associated pelvic pressure  Otherwise, no LOF, VB, or change in FM  Given consistent cramping/contractions and risk factor for PTD, patient instructed to proceed to L&D now for further evaluation  L&D nursing staff and covering [facility] hospitalist notified       Vish Barnes MD  3/22/2023 3:09 AM

## 2023-03-22 NOTE — PATIENT COMMUNICATION
Left another voice mail on pt's voice mail as to ultrasound appt    I left message for pt to call back, received office voice mails

## 2023-03-22 NOTE — TELEPHONE ENCOUNTER
Spoke with Nan Mckinley and got Yolande Yoder an appt today at 10:30 am, [facility]  Attempted to call Yolande Yoder re: appt information but went to voice mail  Also left a voice mail on [employer] [place] mail too  Will send a portal message too
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
## 2023-03-22 NOTE — PATIENT COMMUNICATION
This nurse was able to speak with Abbey Ryan & informed about 10:30 am Doppler testing appt    She states was sleeping  She said would leave her house now to get to [facility],for testing

## 2023-03-22 NOTE — TELEPHONE ENCOUNTER
Patient was seen overnight for evaluation of  contractions  She is currently 28w0d gestational age  During hospital evaluation, she had a secondary complaint of left calf pain  LE dopplers were not able to be done overnight due to no availability of in-house ultrasound services overnight  She needs LE dopplers today to evaluate for DVT  Order placed       Elsworth Curling, MD  3/22/2023 8:41 AM

## 2023-03-30 ENCOUNTER — ROUTINE PRENATAL (OUTPATIENT)
Dept: OBGYN CLINIC | Facility: CLINIC | Age: 28
End: 2023-03-30

## 2023-03-30 VITALS
WEIGHT: 114 LBS | HEIGHT: 58 IN | BODY MASS INDEX: 23.93 KG/M2 | SYSTOLIC BLOOD PRESSURE: 112 MMHG | DIASTOLIC BLOOD PRESSURE: 62 MMHG

## 2023-03-30 DIAGNOSIS — Z3A.34 34 WEEKS GESTATION OF PREGNANCY: ICD-10-CM

## 2023-03-30 DIAGNOSIS — O23.43 GROUP B STREPTOCOCCUS URINARY TRACT INFECTION AFFECTING PREGNANCY IN THIRD TRIMESTER: ICD-10-CM

## 2023-03-30 DIAGNOSIS — O34.211 MATERNAL CARE DUE TO LOW TRANSVERSE UTERINE SCAR FROM PREVIOUS CESAREAN DELIVERY: ICD-10-CM

## 2023-03-30 DIAGNOSIS — O99.019 ANEMIA AFFECTING PREGNANCY, ANTEPARTUM: ICD-10-CM

## 2023-03-30 DIAGNOSIS — Z83.49 FAMILY HISTORY OF G6PD: ICD-10-CM

## 2023-03-30 DIAGNOSIS — Z98.891 HISTORY OF CESAREAN SECTION: ICD-10-CM

## 2023-03-30 DIAGNOSIS — Z3A.33 33 WEEKS GESTATION OF PREGNANCY: ICD-10-CM

## 2023-03-30 DIAGNOSIS — O99.810 ABNORMAL GLUCOSE AFFECTING PREGNANCY: Primary | ICD-10-CM

## 2023-03-30 DIAGNOSIS — O09.899 SHORT INTERVAL BETWEEN PREGNANCIES AFFECTING PREGNANCY, ANTEPARTUM: ICD-10-CM

## 2023-03-30 DIAGNOSIS — B95.1 GROUP B STREPTOCOCCUS URINARY TRACT INFECTION AFFECTING PREGNANCY IN THIRD TRIMESTER: ICD-10-CM

## 2023-03-30 LAB
SL AMB  POCT GLUCOSE, UA: NORMAL
SL AMB POCT URINE PROTEIN: NORMAL

## 2023-03-30 NOTE — PROGRESS NOTES
"Routine Prenatal Visit  [address], [facility], [address] [facility]    Assessment/Plan:  Tiffanie Leo is a 28y o  year old [id number] at 34w1d who presents for routine prenatal visit  1  Abnormal glucose affecting pregnancy    2  33 weeks gestation of pregnancy    3  Family history of G6PD    4  Short interval between pregnancies affecting pregnancy, antepartum    5  Anemia affecting pregnancy, antepartum    6  History of  section    7  Maternal care due to low transverse uterine scar from previous  delivery    8  Group B Streptococcus urinary tract infection affecting pregnancy in third trimester    9  34 weeks gestation of pregnancy  -     POCT urine dip        Next OB Visit 2 weeks  Subjective:     CC: Prenatal care    Hari Davis is a 28 y o  [id number] female who presents for routine prenatal care at 34w1d  Pregnancy ROS: no leakage of fluid, pelvic pain, or vaginal bleeding  normal fetal movement      The following portions of the patient's history were reviewed and updated as appropriate: allergies, current medications, past family history, past medical history, obstetric history, gynecologic history, past social history, past surgical history and problem list       Objective:  /62 (BP Location: Left arm, Patient Position: Sitting, Cuff Size: Standard)   Ht 4' 10\" (1 473 m)   Wt 51 7 kg (114 lb)   LMP 2022 (Exact Date)   BMI 23 83 kg/m²   Pregravid Weight/BMI: 39 9 kg (88 lb) (BMI 18 40)  Current Weight: 51 7 kg (114 lb)   Total Weight Gain: 11 8 kg (26 lb)   Pre-Susan Vitals    Flowsheet Row Most Recent Value   Prenatal Assessment    Fetal Heart Rate 150   Fundal Height (cm) 34 cm   Movement Present   Prenatal Vitals    Blood Pressure 112/62   Weight - Scale 51 7 kg (114 lb)   Urine Albumin/Glucose    Dilation/Effacement/Station    Vaginal Drainage    Draining Fluid No   Edema            General: Well appearing, no distress  Abdomen: " Soft, gravid, nontender  Extremities: Non tender

## 2023-04-04 ENCOUNTER — TELEPHONE (OUTPATIENT)
Dept: OBGYN CLINIC | Facility: CLINIC | Age: 28
End: 2023-04-04

## 2023-04-04 LAB
DME PARACHUTE DELIVERY DATE REQUESTED: NORMAL
DME PARACHUTE ITEM DESCRIPTION: NORMAL
DME PARACHUTE ORDER STATUS: NORMAL
DME PARACHUTE SUPPLIER NAME: NORMAL
DME PARACHUTE SUPPLIER PHONE: NORMAL

## 2023-04-04 NOTE — TELEPHONE ENCOUNTER
Received fax request from [employer] for SI support belt and pp orthotic  Contacted patient who states she is not interested in the additional DME  Only wants the breast pump  Per chart review, no breast pump has been ordered  Alexandra Becker is requesting the Motif duo pump  Advised will order through her chart    Breast pump ordered through [employer]

## 2023-04-07 ENCOUNTER — ROUTINE PRENATAL (OUTPATIENT)
Dept: OBGYN CLINIC | Facility: CLINIC | Age: 28
End: 2023-04-07

## 2023-04-07 VITALS
BODY MASS INDEX: 24.86 KG/M2 | HEIGHT: 58 IN | WEIGHT: 118.4 LBS | DIASTOLIC BLOOD PRESSURE: 52 MMHG | SYSTOLIC BLOOD PRESSURE: 98 MMHG

## 2023-04-07 DIAGNOSIS — O99.810 ABNORMAL GLUCOSE AFFECTING PREGNANCY: ICD-10-CM

## 2023-04-07 DIAGNOSIS — Z83.49 FAMILY HISTORY OF G6PD: ICD-10-CM

## 2023-04-07 DIAGNOSIS — O23.40 GROUP B STREPTOCOCCUS URINARY TRACT INFECTION AFFECTING PREGNANCY, ANTEPARTUM: ICD-10-CM

## 2023-04-07 DIAGNOSIS — O99.019 ANEMIA AFFECTING PREGNANCY, ANTEPARTUM: ICD-10-CM

## 2023-04-07 DIAGNOSIS — B95.1 GROUP B STREPTOCOCCUS URINARY TRACT INFECTION AFFECTING PREGNANCY, ANTEPARTUM: ICD-10-CM

## 2023-04-07 DIAGNOSIS — O34.211 MATERNAL CARE DUE TO LOW TRANSVERSE UTERINE SCAR FROM PREVIOUS CESAREAN DELIVERY: ICD-10-CM

## 2023-04-07 DIAGNOSIS — O09.899 SHORT INTERVAL BETWEEN PREGNANCIES AFFECTING PREGNANCY, ANTEPARTUM: ICD-10-CM

## 2023-04-07 DIAGNOSIS — Z3A.35 35 WEEKS GESTATION OF PREGNANCY: Primary | ICD-10-CM

## 2023-04-07 DIAGNOSIS — Z98.891 HISTORY OF CESAREAN SECTION: ICD-10-CM

## 2023-04-07 LAB
SL AMB  POCT GLUCOSE, UA: NORMAL
SL AMB POCT URINE PROTEIN: NORMAL

## 2023-04-07 NOTE — PROGRESS NOTES
"Routine Prenatal Visit  [address], [address], [facility], [address]    Assessment/Plan:  Gissel Gallo is a 28y o  year old [id number] at 35w2d who presents for routine prenatal visit  1  35 weeks gestation of pregnancy  -     POCT urine dip    2  Abnormal glucose affecting pregnancy    3  Family history of G6PD    4  Short interval between pregnancies affecting pregnancy, antepartum    5  Anemia affecting pregnancy, antepartum    6  History of  section    7  Maternal care due to low transverse uterine scar from previous  delivery    8  Group B Streptococcus urinary tract infection affecting pregnancy, antepartum    + fm  Irregular  UTCX   DW pt hydration  No leaking of fluid  Dw pt family planning declines   Will be careful PP  Introduced possibility of an  IUD,       Subjective:     CC: Prenatal care    Lowell Haddad is a 28 y o  [id number] female who presents for routine prenatal care at 35w2d  Pregnancy ROS: no  leakage of fluid, pelvic pain, or vaginal bleeding   + fetal movement      The following portions of the patient's history were reviewed and updated as appropriate: allergies, current medications, past family history, past medical history, obstetric history, gynecologic history, past social history, past surgical history and problem list       Objective:  BP 98/52 (BP Location: Left arm, Patient Position: Sitting, Cuff Size: Standard)   Ht 4' 10\" (1 473 m)   Wt 53 7 kg (118 lb 6 4 oz)   LMP 2022 (Exact Date)   BMI 24 75 kg/m²   Pregravid Weight/BMI: 39 9 kg (88 lb) (BMI 18 40)  Current Weight: 53 7 kg (118 lb 6 4 oz)   Total Weight Gain: 13 8 kg (30 lb 6 4 oz)   Pre- Vitals    Flowsheet Row Most Recent Value   Prenatal Assessment    Prenatal Vitals    Blood Pressure 98/52   Weight - Scale 53 7 kg (118 lb 6 4 oz)   Urine Albumin/Glucose    Dilation/Effacement/Station    Vaginal Drainage    Edema            General: Well appearing, no " distress  Respiratory: Unlabored breathing  Cardiovascular: Regular rate  Abdomen: Soft, gravid, nontender  Fundal Height: Appropriate for gestational age  Extremities: Warm and well perfused  Non tender

## 2023-04-14 PROBLEM — Z3A.36 36 WEEKS GESTATION OF PREGNANCY: Status: ACTIVE | Noted: 2022-11-16

## 2023-04-22 ENCOUNTER — ANESTHESIA EVENT (OUTPATIENT)
Dept: LABOR AND DELIVERY | Facility: HOSPITAL | Age: 28
End: 2023-04-22

## 2023-04-22 ENCOUNTER — ANESTHESIA (OUTPATIENT)
Dept: LABOR AND DELIVERY | Facility: HOSPITAL | Age: 28
End: 2023-04-22

## 2023-04-22 ENCOUNTER — HOSPITAL ENCOUNTER (INPATIENT)
Facility: HOSPITAL | Age: 28
LOS: 3 days | Discharge: HOME | End: 2023-04-25
Attending: OBSTETRICS & GYNECOLOGY | Admitting: OBSTETRICS & GYNECOLOGY

## 2023-04-22 DIAGNOSIS — D50.9 IRON DEFICIENCY ANEMIA: ICD-10-CM

## 2023-04-22 DIAGNOSIS — Z98.891 STATUS POST REPEAT LOW TRANSVERSE CESAREAN SECTION: Primary | ICD-10-CM

## 2023-04-22 LAB
ALBUMIN SERPL BCP-MCNC: 3 G/DL (ref 3.5–5)
ALP SERPL-CCNC: 194 U/L (ref 34–104)
ALT SERPL W P-5'-P-CCNC: 10 U/L (ref 7–52)
ANION GAP SERPL CALCULATED.3IONS-SCNC: 8 MMOL/L (ref 4–13)
AST SERPL W P-5'-P-CCNC: 19 U/L (ref 13–39)
BILIRUB SERPL-MCNC: 0.48 MG/DL (ref 0.2–1)
BILIRUB UR QL STRIP: NEGATIVE
BUN SERPL-MCNC: 4 MG/DL (ref 5–25)
CALCIUM ALBUM COR SERPL-MCNC: 9.5 MG/DL (ref 8.3–10.1)
CALCIUM SERPL-MCNC: 8.7 MG/DL (ref 8.4–10.2)
CHLORIDE SERPL-SCNC: 108 MMOL/L (ref 96–108)
CLARITY UR: CLEAR
CO2 SERPL-SCNC: 20 MMOL/L (ref 21–32)
COLOR UR: ABNORMAL
CREAT SERPL-MCNC: 0.55 MG/DL (ref 0.6–1.3)
ERYTHROCYTE [DISTWIDTH] IN BLOOD BY AUTOMATED COUNT: 17 % (ref 11.6–15.1)
GFR SERPL CREATININE-BSD FRML MDRD: 128 ML/MIN/1.73SQ M
GLUCOSE SERPL-MCNC: 78 MG/DL (ref 65–140)
GLUCOSE UR STRIP-MCNC: NEGATIVE MG/DL
HCT VFR BLD AUTO: 29.5 % (ref 34.8–46.1)
HGB BLD-MCNC: 9.1 G/DL (ref 11.5–15.4)
HGB UR QL STRIP.AUTO: NEGATIVE
HOLD SPECIMEN: NORMAL
KETONES UR STRIP-MCNC: ABNORMAL MG/DL
LEUKOCYTE ESTERASE UR QL STRIP: NEGATIVE
MCH RBC QN AUTO: 25.1 PG (ref 26.8–34.3)
MCHC RBC AUTO-ENTMCNC: 30.8 G/DL (ref 31.4–37.4)
MCV RBC AUTO: 82 FL (ref 82–98)
NITRITE UR QL STRIP: NEGATIVE
PH UR STRIP.AUTO: 6.5 [PH]
PLATELET # BLD AUTO: 174 THOUSANDS/UL (ref 149–390)
PMV BLD AUTO: 11.9 FL (ref 8.9–12.7)
POTASSIUM SERPL-SCNC: 3.7 MMOL/L (ref 3.5–5.3)
PROT SERPL-MCNC: 6 G/DL (ref 6.4–8.4)
PROT UR STRIP-MCNC: NEGATIVE MG/DL
RBC # BLD AUTO: 3.62 MILLION/UL (ref 3.81–5.12)
SODIUM SERPL-SCNC: 136 MMOL/L (ref 135–147)
SP GR UR STRIP.AUTO: <1.005 (ref 1–1.03)
UROBILINOGEN UR STRIP-ACNC: <2 MG/DL
WBC # BLD AUTO: 11.95 THOUSAND/UL (ref 4.31–10.16)

## 2023-04-22 RX ORDER — BUPIVACAINE HYDROCHLORIDE 7.5 MG/ML
INJECTION, SOLUTION INTRASPINAL AS NEEDED
Status: DISCONTINUED | OUTPATIENT
Start: 2023-04-22 | End: 2023-04-23

## 2023-04-22 RX ORDER — MORPHINE SULFATE 0.5 MG/ML
INJECTION, SOLUTION EPIDURAL; INTRATHECAL; INTRAVENOUS AS NEEDED
Status: DISCONTINUED | OUTPATIENT
Start: 2023-04-22 | End: 2023-04-23

## 2023-04-22 RX ORDER — FENTANYL CITRATE 50 UG/ML
INJECTION, SOLUTION INTRAMUSCULAR; INTRAVENOUS AS NEEDED
Status: DISCONTINUED | OUTPATIENT
Start: 2023-04-22 | End: 2023-04-23

## 2023-04-22 RX ORDER — CEFAZOLIN SODIUM 1 G/50ML
1000 SOLUTION INTRAVENOUS ONCE
Status: DISCONTINUED | OUTPATIENT
Start: 2023-04-22 | End: 2023-04-23

## 2023-04-22 RX ORDER — CEFAZOLIN SODIUM 1 G/50ML
SOLUTION INTRAVENOUS AS NEEDED
Status: DISCONTINUED | OUTPATIENT
Start: 2023-04-22 | End: 2023-04-23

## 2023-04-22 RX ORDER — SODIUM CHLORIDE, SODIUM LACTATE, POTASSIUM CHLORIDE, CALCIUM CHLORIDE 600; 310; 30; 20 MG/100ML; MG/100ML; MG/100ML; MG/100ML
125 INJECTION, SOLUTION INTRAVENOUS CONTINUOUS
Status: DISCONTINUED | OUTPATIENT
Start: 2023-04-22 | End: 2023-04-23

## 2023-04-22 RX ORDER — ONDANSETRON 2 MG/ML
4 INJECTION INTRAMUSCULAR; INTRAVENOUS EVERY 8 HOURS PRN
Status: DISCONTINUED | OUTPATIENT
Start: 2023-04-22 | End: 2023-04-23 | Stop reason: SDUPTHER

## 2023-04-22 RX ADMIN — PHENYLEPHRINE HYDROCHLORIDE 60 MCG/MIN: 10 INJECTION, SOLUTION INTRAVENOUS at 23:50

## 2023-04-22 RX ADMIN — FENTANYL CITRATE 15 MCG: 50 INJECTION, SOLUTION INTRAMUSCULAR; INTRAVENOUS at 23:50

## 2023-04-22 RX ADMIN — Medication 500 MG: at 23:54

## 2023-04-22 RX ADMIN — BUPIVACAINE HYDROCHLORIDE IN DEXTROSE 1.2 ML: 7.5 INJECTION, SOLUTION SUBARACHNOID at 23:50

## 2023-04-22 RX ADMIN — SODIUM CHLORIDE, SODIUM LACTATE, POTASSIUM CHLORIDE, AND CALCIUM CHLORIDE 1000 ML: .6; .31; .03; .02 INJECTION, SOLUTION INTRAVENOUS at 20:54

## 2023-04-22 RX ADMIN — MORPHINE SULFATE 0.15 MG: 0.5 INJECTION EPIDURAL; INTRATHECAL; INTRAVENOUS at 23:50

## 2023-04-22 RX ADMIN — SODIUM CHLORIDE, SODIUM LACTATE, POTASSIUM CHLORIDE, AND CALCIUM CHLORIDE 125 ML/HR: .6; .31; .03; .02 INJECTION, SOLUTION INTRAVENOUS at 23:32

## 2023-04-22 RX ADMIN — CEFAZOLIN SODIUM 1000 MG: 1 SOLUTION INTRAVENOUS at 23:51

## 2023-04-22 RX ADMIN — SODIUM CHLORIDE, SODIUM LACTATE, POTASSIUM CHLORIDE, AND CALCIUM CHLORIDE: .6; .31; .03; .02 INJECTION, SOLUTION INTRAVENOUS at 23:47

## 2023-04-23 LAB
ABO GROUP BLD: NORMAL
BASE EXCESS BLDCOA CALC-SCNC: -2.5 MMOL/L (ref 3–11)
BASE EXCESS BLDCOV CALC-SCNC: -1.3 MMOL/L (ref 1–9)
BLD GP AB SCN SERPL QL: NEGATIVE
HCO3 BLDCOA-SCNC: 22.9 MMOL/L (ref 17.3–27.3)
HCO3 BLDCOV-SCNC: 22.8 MMOL/L (ref 12.2–28.6)
O2 CT VFR BLDCOA CALC: 9.2 ML/DL
OXYHGB MFR BLDCOA: 37.7 %
OXYHGB MFR BLDCOV: 55.5 %
PCO2 BLDCOA: 41.7 MM[HG] (ref 30–60)
PCO2 BLDCOV: 36.8 MM HG (ref 27–43)
PH BLDCOA: 7.36 [PH] (ref 7.23–7.43)
PH BLDCOV: 7.41 [PH] (ref 7.19–7.49)
PO2 BLDCOA: 17.8 MM HG (ref 5–25)
PO2 BLDCOV: 22.5 MM HG (ref 15–45)
RH BLD: POSITIVE
SAO2 % BLDCOV: 13 ML/DL
SPECIMEN EXPIRATION DATE: NORMAL

## 2023-04-23 RX ORDER — ONDANSETRON 2 MG/ML
4 INJECTION INTRAMUSCULAR; INTRAVENOUS ONCE AS NEEDED
Status: DISCONTINUED | OUTPATIENT
Start: 2023-04-23 | End: 2023-04-23

## 2023-04-23 RX ORDER — AMOXICILLIN 250 MG/1
500 CAPSULE ORAL EVERY 8 HOURS SCHEDULED
Status: DISCONTINUED | OUTPATIENT
Start: 2023-04-23 | End: 2023-04-23 | Stop reason: ALTCHOICE

## 2023-04-23 RX ORDER — SODIUM CHLORIDE, SODIUM LACTATE, POTASSIUM CHLORIDE, CALCIUM CHLORIDE 600; 310; 30; 20 MG/100ML; MG/100ML; MG/100ML; MG/100ML
INJECTION, SOLUTION INTRAVENOUS CONTINUOUS PRN
Status: DISCONTINUED | OUTPATIENT
Start: 2023-04-22 | End: 2023-04-23

## 2023-04-23 RX ORDER — SODIUM CHLORIDE, SODIUM LACTATE, POTASSIUM CHLORIDE, CALCIUM CHLORIDE 600; 310; 30; 20 MG/100ML; MG/100ML; MG/100ML; MG/100ML
100 INJECTION, SOLUTION INTRAVENOUS CONTINUOUS
Status: DISCONTINUED | OUTPATIENT
Start: 2023-04-23 | End: 2023-04-25 | Stop reason: HOSPADM

## 2023-04-23 RX ORDER — METOCLOPRAMIDE HYDROCHLORIDE 5 MG/ML
5 INJECTION INTRAMUSCULAR; INTRAVENOUS EVERY 6 HOURS PRN
Status: ACTIVE | OUTPATIENT
Start: 2023-04-23 | End: 2023-04-24

## 2023-04-23 RX ORDER — ACETAMINOPHEN 325 MG/1
650 TABLET ORAL EVERY 6 HOURS PRN
Status: DISCONTINUED | OUTPATIENT
Start: 2023-04-23 | End: 2023-04-23 | Stop reason: SDUPTHER

## 2023-04-23 RX ORDER — PSEUDOEPHEDRINE HCL 30 MG
60 TABLET ORAL EVERY 6 HOURS PRN
Status: DISCONTINUED | OUTPATIENT
Start: 2023-04-23 | End: 2023-04-25 | Stop reason: HOSPADM

## 2023-04-23 RX ORDER — ONDANSETRON 2 MG/ML
INJECTION INTRAMUSCULAR; INTRAVENOUS AS NEEDED
Status: DISCONTINUED | OUTPATIENT
Start: 2023-04-23 | End: 2023-04-23

## 2023-04-23 RX ORDER — DIPHENHYDRAMINE HYDROCHLORIDE 50 MG/ML
25 INJECTION INTRAMUSCULAR; INTRAVENOUS EVERY 6 HOURS PRN
Status: DISCONTINUED | OUTPATIENT
Start: 2023-04-23 | End: 2023-04-25 | Stop reason: HOSPADM

## 2023-04-23 RX ORDER — IBUPROFEN 600 MG/1
600 TABLET ORAL EVERY 6 HOURS
Status: DISCONTINUED | OUTPATIENT
Start: 2023-04-24 | End: 2023-04-25 | Stop reason: HOSPADM

## 2023-04-23 RX ORDER — AMOXICILLIN 500 MG/1
500 CAPSULE ORAL EVERY 8 HOURS
Status: DISCONTINUED | OUTPATIENT
Start: 2023-04-23 | End: 2023-04-25 | Stop reason: HOSPADM

## 2023-04-23 RX ORDER — OXYTOCIN/RINGER'S LACTATE 30/500 ML
PLASTIC BAG, INJECTION (ML) INTRAVENOUS CONTINUOUS PRN
Status: DISCONTINUED | OUTPATIENT
Start: 2023-04-23 | End: 2023-04-23

## 2023-04-23 RX ORDER — SIMETHICONE 80 MG
80 TABLET,CHEWABLE ORAL EVERY 6 HOURS PRN
Status: DISCONTINUED | OUTPATIENT
Start: 2023-04-23 | End: 2023-04-25 | Stop reason: HOSPADM

## 2023-04-23 RX ORDER — KETOROLAC TROMETHAMINE 30 MG/ML
30 INJECTION, SOLUTION INTRAMUSCULAR; INTRAVENOUS EVERY 6 HOURS
Status: COMPLETED | OUTPATIENT
Start: 2023-04-23 | End: 2023-04-24

## 2023-04-23 RX ORDER — ONDANSETRON 2 MG/ML
4 INJECTION INTRAMUSCULAR; INTRAVENOUS EVERY 8 HOURS PRN
Status: DISCONTINUED | OUTPATIENT
Start: 2023-04-23 | End: 2023-04-25 | Stop reason: HOSPADM

## 2023-04-23 RX ORDER — ONDANSETRON 2 MG/ML
4 INJECTION INTRAMUSCULAR; INTRAVENOUS EVERY 6 HOURS PRN
Status: DISCONTINUED | OUTPATIENT
Start: 2023-04-23 | End: 2023-04-23 | Stop reason: SDUPTHER

## 2023-04-23 RX ORDER — CALCIUM CARBONATE 200(500)MG
1000 TABLET,CHEWABLE ORAL DAILY PRN
Status: DISCONTINUED | OUTPATIENT
Start: 2023-04-23 | End: 2023-04-25 | Stop reason: HOSPADM

## 2023-04-23 RX ORDER — DIPHENHYDRAMINE HYDROCHLORIDE 50 MG/ML
25 INJECTION INTRAMUSCULAR; INTRAVENOUS EVERY 6 HOURS PRN
Status: DISCONTINUED | OUTPATIENT
Start: 2023-04-23 | End: 2023-04-23 | Stop reason: SDUPTHER

## 2023-04-23 RX ORDER — KETOROLAC TROMETHAMINE 30 MG/ML
15 INJECTION, SOLUTION INTRAMUSCULAR; INTRAVENOUS EVERY 6 HOURS PRN
Status: DISCONTINUED | OUTPATIENT
Start: 2023-04-23 | End: 2023-04-23 | Stop reason: SDUPTHER

## 2023-04-23 RX ORDER — NALOXONE HYDROCHLORIDE 0.4 MG/ML
0.1 INJECTION, SOLUTION INTRAMUSCULAR; INTRAVENOUS; SUBCUTANEOUS
Status: ACTIVE | OUTPATIENT
Start: 2023-04-23 | End: 2023-04-24

## 2023-04-23 RX ORDER — OXYTOCIN/RINGER'S LACTATE 30/500 ML
62.5 PLASTIC BAG, INJECTION (ML) INTRAVENOUS ONCE
Status: COMPLETED | OUTPATIENT
Start: 2023-04-23 | End: 2023-04-23

## 2023-04-23 RX ORDER — DOCUSATE SODIUM 100 MG/1
100 CAPSULE, LIQUID FILLED ORAL 2 TIMES DAILY
Status: DISCONTINUED | OUTPATIENT
Start: 2023-04-23 | End: 2023-04-25 | Stop reason: HOSPADM

## 2023-04-23 RX ORDER — NALBUPHINE HCL 10 MG/ML
5 AMPUL (ML) INJECTION EVERY 4 HOURS PRN
Status: DISCONTINUED | OUTPATIENT
Start: 2023-04-23 | End: 2023-04-25 | Stop reason: HOSPADM

## 2023-04-23 RX ORDER — PSEUDOEPHEDRINE HCL 120 MG/1
120 TABLET, FILM COATED, EXTENDED RELEASE ORAL 2 TIMES DAILY
Status: DISCONTINUED | OUTPATIENT
Start: 2023-04-23 | End: 2023-04-23

## 2023-04-23 RX ORDER — METOCLOPRAMIDE HYDROCHLORIDE 5 MG/ML
5 INJECTION INTRAMUSCULAR; INTRAVENOUS ONCE AS NEEDED
Status: DISCONTINUED | OUTPATIENT
Start: 2023-04-23 | End: 2023-04-23 | Stop reason: SDUPTHER

## 2023-04-23 RX ORDER — ACETAMINOPHEN 325 MG/1
650 TABLET ORAL EVERY 4 HOURS PRN
Status: DISCONTINUED | OUTPATIENT
Start: 2023-04-23 | End: 2023-04-25 | Stop reason: HOSPADM

## 2023-04-23 RX ORDER — DIAPER,BRIEF,INFANT-TODD,DISP
1 EACH MISCELLANEOUS DAILY PRN
Status: DISCONTINUED | OUTPATIENT
Start: 2023-04-23 | End: 2023-04-25 | Stop reason: HOSPADM

## 2023-04-23 RX ORDER — HYDROMORPHONE HCL/PF 1 MG/ML
0.5 SYRINGE (ML) INJECTION EVERY 2 HOUR PRN
Status: DISCONTINUED | OUTPATIENT
Start: 2023-04-23 | End: 2023-04-25

## 2023-04-23 RX ORDER — FENTANYL CITRATE/PF 50 MCG/ML
50 SYRINGE (ML) INJECTION
Status: DISCONTINUED | OUTPATIENT
Start: 2023-04-23 | End: 2023-04-25 | Stop reason: HOSPADM

## 2023-04-23 RX ORDER — HYDROMORPHONE HCL/PF 1 MG/ML
0.5 SYRINGE (ML) INJECTION
Status: DISCONTINUED | OUTPATIENT
Start: 2023-04-23 | End: 2023-04-25

## 2023-04-23 RX ADMIN — KETOROLAC TROMETHAMINE 15 MG: 30 INJECTION, SOLUTION INTRAMUSCULAR; INTRAVENOUS at 00:34

## 2023-04-23 RX ADMIN — ACETAMINOPHEN 325MG 650 MG: 325 TABLET ORAL at 19:56

## 2023-04-23 RX ADMIN — Medication 250 MILLI-UNITS/MIN: at 00:14

## 2023-04-23 RX ADMIN — KETOROLAC TROMETHAMINE 30 MG: 30 INJECTION, SOLUTION INTRAMUSCULAR; INTRAVENOUS at 18:02

## 2023-04-23 RX ADMIN — DOCUSATE SODIUM 100 MG: 100 CAPSULE, LIQUID FILLED ORAL at 12:15

## 2023-04-23 RX ADMIN — AMOXICILLIN 500 MG: 500 CAPSULE ORAL at 15:13

## 2023-04-23 RX ADMIN — ACETAMINOPHEN 325MG 650 MG: 325 TABLET ORAL at 15:13

## 2023-04-23 RX ADMIN — ONDANSETRON 4 MG: 2 INJECTION INTRAMUSCULAR; INTRAVENOUS at 00:46

## 2023-04-23 RX ADMIN — SIMETHICONE 80 MG: 80 TABLET, CHEWABLE ORAL at 18:02

## 2023-04-23 RX ADMIN — Medication 62.5 MILLI-UNITS/MIN: at 01:56

## 2023-04-23 RX ADMIN — DOCUSATE SODIUM 100 MG: 100 CAPSULE, LIQUID FILLED ORAL at 18:02

## 2023-04-23 RX ADMIN — SODIUM CHLORIDE, SODIUM LACTATE, POTASSIUM CHLORIDE, AND CALCIUM CHLORIDE 100 ML/HR: .6; .31; .03; .02 INJECTION, SOLUTION INTRAVENOUS at 05:59

## 2023-04-23 RX ADMIN — AMOXICILLIN 500 MG: 500 CAPSULE ORAL at 22:24

## 2023-04-23 RX ADMIN — KETOROLAC TROMETHAMINE 30 MG: 30 INJECTION, SOLUTION INTRAMUSCULAR; INTRAVENOUS at 12:15

## 2023-04-23 RX ADMIN — KETOROLAC TROMETHAMINE 30 MG: 30 INJECTION, SOLUTION INTRAMUSCULAR; INTRAVENOUS at 06:00

## 2023-04-23 RX ADMIN — SODIUM CHLORIDE, SODIUM LACTATE, POTASSIUM CHLORIDE, AND CALCIUM CHLORIDE 999 ML/HR: .6; .31; .03; .02 INJECTION, SOLUTION INTRAVENOUS at 10:47

## 2023-04-23 RX ADMIN — DIPHENHYDRAMINE HYDROCHLORIDE 25 MG: 50 INJECTION, SOLUTION INTRAMUSCULAR; INTRAVENOUS at 01:32

## 2023-04-23 NOTE — OP NOTE
OPERATIVE REPORT  PATIENT NAME: Myrna Antoine    :  1995  MRN: [id number]  Pt Location:  L&D OR ROOM 01    SURGERY DATE: 2023    Surgeon(s) and Role:     * Krystle Lee DO - Primary     * Trine Favre, MD - Assisting    No qualified surgical resident was available to assist in the case  The assistance of an additional surgeon was critical for completion of the case  The assistant surgeon provided assistance with exposure, hemostasis, delivery of the infant, tissue manipulation, and suturing  The assistant surgeon was present from the start of the procedure until fascial closure  I, the primary surgeon, was present and scrubbed throughout the entirety of the case and performed all key portions of the surgical procedure  PreOperative Diagnosis:   1  [facility] pregnancy in vertex presentation at 37w4d gestation for history of prior  section  2  PROM at the time of spinal with painful contractions occurring every 2 minutes    Post Operative Diagnosis:   1  same    Procedure:   1  Repeat Low Transverse  Section via Pfannenstiel skin incision    Comorbidities:  1  None    Anesthesia: Spinal    UOP: clear via allison catheter    EBL: 887 mL    Complications: none    Specimens:   1  Cord Blood  2  Blood gases    Indication for Surgery:   Myrna Antoine is a 28 y o  [id number] at 37w4d for repeat  section for painful frequent contractions and PROM  All risks, benefits, and alternatives were discussed with the patient  All questions were answered  The patient agreed to proceed with surgery  Findings:   1  Live male infant delivered from vertex position through clear fluid at 0012, weight 3070 grams, Apgar scores of  9 and 9 at 1 and 5 minutes, respectively  No nuchal cord  2  Intact placenta delivered via fundal massage, 3-vessel cord noted  3  Normal appearing bilateral fallopian tubes and ovaries  Description of Procedure:   The patient was taken to the operating room, where she was placed under spinal anesthesia  The patient was then placed in supine position with a leftward tilt  She was prepped and draped in the normal sterile fashion  When anesthesia was found to be adequate, a Pfannenstiel skin incision was made with a scalpel and carried down to the underlying layer of fascia  The fascia was then incised with a scalpel and extended laterally and superiorly with curved Stahl scissors  The superior portion of the fascial incision was then grasped with two Kocher clamps, elevated, and dense adhesions of fasia to underlying rectus muscles and peritoneum were noted and carefully transacted with Stahl secissors  Attention was then turned to the inferior aspect of the fascial incision, which in a similar manner, was grasped with two Kocher clamps, elevated, and  from the underlying rectus muscles using sharp and blunt dissection  The rectus muscles were noted to be  in the midline with adhesions to peritoneum  The peritoneum was entered bluntly  The peritoneal incision was then extended superiorly, inferiorly, and laterally using Metzenbaum scissors  with excellent visualization of the bladder  When adequate exposure had been achieved, the bladder blade was then placed  The vesicouterine peritoneum was identified  A low transverse uterine incision was then made with a scalpel  The uterus was entered bluntly and the hysterotomy was extended bluntly in a cephalad-caudad manner  Amniotomy was performed  The infant was then delivered in cephalic presentation  After delayed cord clamping for 30 seconds, the cord was clamped and cut, and the infant was handed off to awaiting Pediatricians  Cord blood was collected and the placenta was delivered with fundal massage noted to be intact with 3-vessel cord  The uterus was then exteriorized and cleared of all clots and debris   The hysterotomy was then closed with 0-Vicryl suture in a running locked fashion  A second layer of the same suture was used in a imbricating fashion in order to obtain excellent hemostasis  The uterus was then returned to the abdomen, and the gutters were cleared of all clots and debris  The hysterotomy was inspected and noted to be hemostatic  The rectus muscles were re-approximated with interrupted stitches of 0-Vicryl  The fascia was then inspected and a 3 cm defect was noted at just above the superior aspect of the fascial incision  The 3 cm defect was closed with 0 Vicryl in running fashion  The fascia was then closed with 0-vicryl in running fasion  The skin was then closed with Insorb staples in subcuticular fashion  The patient tolerated the procedure well  Sponge, lap, and needle counts were correct x 3  The patient was taken to the Recovery Room in stable condition              Soraya Turner DO  4/23/2023  12:52 AM

## 2023-04-23 NOTE — ANESTHESIA PREPROCEDURE EVALUATION
Procedure:  LABOR ANALGESIA    Relevant Problems   ANESTHESIA (within normal limits)      HEMATOLOGY   (+) Anemia affecting pregnancy, antepartum   (+) Iron deficiency anemia      Other   (+) History of  section x 2     Labs:   Results from last 7 days   Lab Units 23  2101   WBC Thousand/uL 11 95*   HEMOGLOBIN g/dL 9 1*   HEMATOCRIT % 29 5*   PLATELETS Thousands/uL 174     Results from last 7 days   Lab Units 23  2101   SODIUM mmol/L 136   POTASSIUM mmol/L 3 7   CHLORIDE mmol/L 108   CO2 mmol/L 20*   ANION GAP mmol/L 8   BUN mg/dL 4*   CREATININE mg/dL 0 55*   EGFR ml/min/1 73sq m 128   CALCIUM mg/dL 8 7   GLUCOSE RANDOM mg/dL 78   ALT U/L 10   AST U/L 19   ALK PHOS U/L 194*   ALBUMIN g/dL 3 0*   TOTAL BILIRUBIN mg/dL 0 48     Physical Exam    Airway    Mallampati score: I  TM Distance: >3 FB  Neck ROM: full     Dental   No notable dental hx     Cardiovascular  Cardiovascular exam normal    Pulmonary  Pulmonary exam normal     Other Findings        Anesthesia Plan  ASA Score- 1 Emergent    Anesthesia Type- spinal with ASA Monitors  Additional Monitors:   Airway Plan:     Comment: I discussed with patient the risks(reviewed with patient on the anesthesia consent form), benefits and alternatives of regional anesthesia also including possibilities of infection, temporary paralysis and nerve injury  I answered patient questions and obtained consent  Planned regional anesthesia spinal with duramorph for postoperative pain control    Plan Factors-    Chart reviewed  Existing labs reviewed  Patient summary reviewed  Induction-     Postoperative Plan- Plan for postoperative opioid use  Informed Consent- Anesthetic plan and risks discussed with patient  I personally reviewed this patient with the CRNA  Discussed and agreed on the Anesthesia Plan with the CRNA  Ludmila Manriquez

## 2023-04-23 NOTE — ANESTHESIA POSTPROCEDURE EVALUATION
Post-Op Assessment Note    CV Status:  Stable    Pain management: adequate     Mental Status:  Alert and awake   Hydration Status:  Euvolemic   PONV Controlled:  Controlled   Airway Patency:  Patent      Post Op Vitals Reviewed: Yes      Staff: Anesthesiologist, CRNA         No notable events documented      /55 (04/23/23 0055)    Temp 97 6 °F (36 4 °C) (04/23/23 0055)    Pulse 77 (04/23/23 0055)   Resp 18 (04/23/23 0055)    SpO2 98 % (04/23/23 0055)

## 2023-04-23 NOTE — PLAN OF CARE
Problem: PAIN - ADULT  Goal: Verbalizes/displays adequate comfort level or baseline comfort level  Description: Interventions:  - Encourage patient to monitor pain and request assistance  - Assess pain using appropriate pain scale  - Administer analgesics based on type and severity of pain and evaluate response  - Implement non-pharmacological measures as appropriate and evaluate response  - Consider cultural and social influences on pain and pain management  - Notify physician/advanced practitioner if interventions unsuccessful or patient reports new pain  Outcome: Progressing     Problem: INFECTION - ADULT  Goal: Absence or prevention of progression during hospitalization  Description: INTERVENTIONS:  - Assess and monitor for signs and symptoms of infection  - Monitor lab/diagnostic results  - Monitor all insertion sites, i e  indwelling lines, tubes, and drains  - Monitor endotracheal if appropriate and nasal secretions for changes in amount and color  - [place] appropriate cooling/warming therapies per order  - Administer medications as ordered  - Instruct and encourage patient and family to use good hand hygiene technique  - Identify and instruct in appropriate isolation precautions for identified infection/condition  Outcome: Progressing  Goal: Absence of fever/infection during neutropenic period  Description: INTERVENTIONS:  - Monitor WBC    Outcome: Progressing     Problem: SAFETY ADULT  Goal: Patient will remain free of falls  Description: INTERVENTIONS:  - Educate patient/family on patient safety including physical limitations  - Instruct patient to call for assistance with activity   - Consult OT/PT to assist with strengthening/mobility   - Keep Call bell within reach  - Keep bed low and locked with side rails adjusted as appropriate  - Keep care items and personal belongings within reach  - Initiate and maintain comfort rounds  - Make Fall Risk Sign visible to staff  - Offer Toileting every  Hours, in advance of need  - Initiate/Maintain alarm  - Obtain necessary fall risk management equipment:   - Apply yellow socks and bracelet for high fall risk patients  - Consider moving patient to room near nurses station  Outcome: Progressing  Goal: Maintain or return to baseline ADL function  Description: INTERVENTIONS:  -  Assess patient's ability to carry out ADLs; assess patient's baseline for ADL function and identify physical deficits which impact ability to perform ADLs (bathing, care of mouth/teeth, toileting, grooming, dressing, etc )  - Assess/evaluate cause of self-care deficits   - Assess range of motion  - Assess patient's mobility; develop plan if impaired  - Assess patient's need for assistive devices and provide as appropriate  - Encourage maximum independence but intervene and supervise when necessary  - Involve family in performance of ADLs  - Assess for home care needs following discharge   - Consider OT consult to assist with ADL evaluation and planning for discharge  - Provide patient education as appropriate  Outcome: Progressing  Goal: Maintains/Returns to pre admission functional level  Description: INTERVENTIONS:  - Perform BMAT or MOVE assessment daily    - Set and communicate daily mobility goal to care team and patient/family/caregiver  - Collaborate with rehabilitation services on mobility goals if consulted  - Perform Range of Motion  times a day  - Reposition patient every  hours    - Dangle patient  times a day  - Stand patient  times a day  - Ambulate patient  times a day  - Out of bed to chair  times a day   - Out of bed for meals  times a day  - Out of bed for toileting  - Record patient progress and toleration of activity level   Outcome: Progressing     Problem: Knowledge Deficit  Goal: Patient/family/caregiver demonstrates understanding of disease process, treatment plan, medications, and discharge instructions  Description: Complete learning assessment and assess knowledge base   Interventions:  - Provide teaching at level of understanding  - Provide teaching via preferred learning methods  Outcome: Progressing     Problem: DISCHARGE PLANNING  Goal: Discharge to home or other facility with appropriate resources  Description: INTERVENTIONS:  - Identify barriers to discharge w/patient and caregiver  - Arrange for needed discharge resources and transportation as appropriate  - Identify discharge learning needs (meds, wound care, etc )  - Arrange for interpretive services to assist at discharge as needed  - Refer to Case Management Department for coordinating discharge planning if the patient needs post-hospital services based on physician/advanced practitioner order or complex needs related to functional status, cognitive ability, or social support system  Outcome: Progressing     Problem: POSTPARTUM  Goal: Experiences normal postpartum course  Description: INTERVENTIONS:  - Monitor maternal vital signs  - Assess uterine involution and lochia  Outcome: Progressing  Goal: Appropriate maternal -  bonding  Description: INTERVENTIONS:  - Identify family support  - Assess for appropriate maternal/infant bonding   -Encourage maternal/infant bonding opportunities  - Referral to  or  as needed  Outcome: Progressing  Goal: Establishment of infant feeding pattern  Description: INTERVENTIONS:  - Assess breast/bottle feeding  - Refer to lactation as needed  Outcome: Progressing  Goal: Incision(s), wounds(s) or drain site(s) healing without S/S of infection  Description: INTERVENTIONS  - Assess and document dressing, incision, wound bed, drain sites and surrounding tissue  - Provide patient and family education  - Perform skin care/dressing changes every   Outcome: Progressing

## 2023-04-23 NOTE — ANESTHESIA PROCEDURE NOTES
Spinal Block    Patient location during procedure: OR  Start time: 4/22/2023 11:50 PM  Staffing  Performed: CRNA   Anesthesiologist: Nitza Hampton MD  Resident/CRNA: Yanely Guerra CRNA  Preanesthetic Checklist  Completed: patient identified, IV checked, risks and benefits discussed, surgical consent, monitors and equipment checked, pre-op evaluation and timeout performed  Spinal Block  Patient position: sitting  Prep: ChloraPrep  Patient monitoring: heart rate, continuous pulse ox and frequent blood pressure checks  Approach: midline  Location: L3-4  Injection technique: single-shot  Needle  Needle type: pencil-tip   Needle gauge: 25 G  Assessment  Injection Assessment:  negative aspiration for heme, no paresthesia on injection and positive aspiration for clear CSF    Post-procedure:  site cleaned

## 2023-04-23 NOTE — PROGRESS NOTES
Post- Progress note    Patient is post-op day 0 from a  delivery       Pain: controlled  Tolerating Oral Intake: yes  Voiding: yes  Flatus: yes  BM- no  Ambulating: yes  Breastfeeding:well  Shortness of Breath: no  Chest pain- no  Lochia: minimal    Objective:   Vitals:    23 0600   BP: 114/68   Pulse: 73   Resp: 17   Temp:    SpO2: 99%       Intake/Output Summary (Last 24 hours) at 2023 1013  Last data filed at 2023 0559  Gross per 24 hour   Intake 1928 75 ml   Output 296 ml   Net 1632 75 ml       Physical Exam:  General: a/a/o x 3 no acute distress  Cardiovascular: RRR  Lungs: clear  Abdomen: soft, nontender, minimal distention, normal BS  Fundus: below umbilicus  Incision: clean, dry, intact with dressing in place  Lower extremeties: minimal edema no signs DVT        Assessment and Plan:  28y o  year-old [id number], postoperative day 0 status-post  delivery    Continue routine Post Op care  Encourage ambulation  Regular diet    Natacha Dias DO
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
## 2023-04-23 NOTE — PLAN OF CARE
Problem: PAIN - ADULT  Goal: Verbalizes/displays adequate comfort level or baseline comfort level  Description: Interventions:  - Encourage patient to monitor pain and request assistance  - Assess pain using appropriate pain scale  - Administer analgesics based on type and severity of pain and evaluate response  - Implement non-pharmacological measures as appropriate and evaluate response  - Consider cultural and social influences on pain and pain management  - Notify physician/advanced practitioner if interventions unsuccessful or patient reports new pain  Outcome: Progressing     Problem: INFECTION - ADULT  Goal: Absence or prevention of progression during hospitalization  Description: INTERVENTIONS:  - Assess and monitor for signs and symptoms of infection  - Monitor lab/diagnostic results  - Monitor all insertion sites, i e  indwelling lines, tubes, and drains  - Monitor endotracheal if appropriate and nasal secretions for changes in amount and color  - [place] appropriate cooling/warming therapies per order  - Administer medications as ordered  - Instruct and encourage patient and family to use good hand hygiene technique  - Identify and instruct in appropriate isolation precautions for identified infection/condition  Outcome: Progressing  Goal: Absence of fever/infection during neutropenic period  Description: INTERVENTIONS:  - Monitor WBC    Outcome: Progressing     Problem: SAFETY ADULT  Goal: Patient will remain free of falls  Description: INTERVENTIONS:  - Educate patient/family on patient safety including physical limitations  - Instruct patient to call for assistance with activity   - Consult OT/PT to assist with strengthening/mobility   - Keep Call bell within reach  - Keep bed low and locked with side rails adjusted as appropriate  - Keep care items and personal belongings within reach  - Initiate and maintain comfort rounds  - Make Fall Risk Sign visible to staff  - Apply yellow socks and bracelet for high fall risk patients  - Consider moving patient to room near nurses station  Outcome: Progressing  Goal: Maintain or return to baseline ADL function  Description: INTERVENTIONS:  -  Assess patient's ability to carry out ADLs; assess patient's baseline for ADL function and identify physical deficits which impact ability to perform ADLs (bathing, care of mouth/teeth, toileting, grooming, dressing, etc )  - Assess/evaluate cause of self-care deficits   - Assess range of motion  - Assess patient's mobility; develop plan if impaired  - Assess patient's need for assistive devices and provide as appropriate  - Encourage maximum independence but intervene and supervise when necessary  - Involve family in performance of ADLs  - Assess for home care needs following discharge   - Consider OT consult to assist with ADL evaluation and planning for discharge  - Provide patient education as appropriate  Outcome: Progressing  Goal: Maintains/Returns to pre admission functional level  Description: INTERVENTIONS:  - Perform BMAT or MOVE assessment daily    - Set and communicate daily mobility goal to care team and patient/family/caregiver  - Collaborate with rehabilitation services on mobility goals if consulted  - Out of bed for toileting  - Record patient progress and toleration of activity level   Outcome: Progressing     Problem: Knowledge Deficit  Goal: Patient/family/caregiver demonstrates understanding of disease process, treatment plan, medications, and discharge instructions  Description: Complete learning assessment and assess knowledge base    Interventions:  - Provide teaching at level of understanding  - Provide teaching via preferred learning methods  Outcome: Progressing     Problem: DISCHARGE PLANNING  Goal: Discharge to home or other facility with appropriate resources  Description: INTERVENTIONS:  - Identify barriers to discharge w/patient and caregiver  - Arrange for needed discharge resources and transportation as appropriate  - Identify discharge learning needs (meds, wound care, etc )  - Arrange for interpretive services to assist at discharge as needed  - Refer to Case Management Department for coordinating discharge planning if the patient needs post-hospital services based on physician/advanced practitioner order or complex needs related to functional status, cognitive ability, or social support system  Outcome: Progressing

## 2023-04-23 NOTE — PROGRESS NOTES
Chief Complaint:  Abdominal Pain (constant stomach pain, uncontrolable accidents. has constipation issues. ), Chest Pain (recently started with stomach pains and somtimes seperate. chest hurting this morning on the way here today. ), and Derm Problem (red patches on the back of her hands on and off. happens when she washes her hands. )    History of present illness:  [place] is a 8 year old female who presents with her mother with concerns of constipation since about 4 or 5 years old. She has been treated with Miralax in the past.  She does improve, but them Mom stops and it worsens again.  She does have some leakage of stool when she farts sometimes.  She tries to pass a stool at least 1-2 times a day.  She actually passes a stool about once every 3 days and the stools are painful. Rarely she has noticed blood.    She also gets redness on the back of her hands.  This has been occurring off an don for the past couple of months.  It is worse with washing her hands and it itches and burns. We discussed using petroleum jelly to help moisturize overnight.    Past Medical History:   Diagnosis Date   • Allergic conjunctivitis and rhinitis    • Constipation    • Febrile seizure (CMS/HCC)     until age 4   • Prematurity      Medications:    polyethylene glycol (MIRALAX) 17 GM/SCOOP powder    No current facility-administered medications on file prior to visit.     Allergies:  Allergies as of 02/26/2021   • (No Known Allergies)     Social History:  Lives with Mom, maternal grandparents, sister, brother  :  No   Pets:  2 dogs and 1 bunny  Tobacco Exposure:  Maternal grandfather smokes outside    Family History:  family history includes Anemia in her mother; Cancer, Ovarian in her maternal grandmother; Diabetes in her maternal grandmother; Gastrointestinal in her father; Kidney disease in her maternal grandmother; Other in her maternal grandmother.      Physical examination:  Visit Vitals  BP (!) 82/58   Pulse 86  Triage Note - OB  Vidhi Cotter 28 y o  female MRN: [id number]  Unit/Bed#: LD TRIAGE 2 Encounter: [id number]    Chief Complaint:     SUBJECTIVE    HPI: 28 y o  [id number] at 37w3d with c/o contractions since yesterday - have gotten more intense today  She has sinus infection and is taking amoxicillin for the past 3 days  She also reports N/V/D since last night  Denies fever  neg vaginal bleeding  Neg loss of fluid   pos fetal movement    OBJECTIVE  Previous delivery(s) were   Complications of pregnancy:   Patient Active Problem List   Diagnosis   • Maternal care due to low transverse uterine scar from previous  delivery   • Syncope   • History of prior pregnancy with IUGR    • Other constipation   • History of  section x 2   • Iron deficiency anemia   • Family history of G6PD   • Short interval between pregnancies affecting pregnancy, antepartum   • Anemia affecting pregnancy, antepartum   • 36 weeks gestation of pregnancy   • Pregnancy headache in second trimester   • Abnormal glucose affecting pregnancy   • Acute cystitis during pregnancy in third trimester   • Group B streptococcus bacteriuria affecting pregnancy     GBS: +  Blood type: A+    Estimated Date of Delivery: 5/10/23    Vitals: VSS  Vitals:    23   BP: 116/61   Pulse: 80   Resp: 18   Temp: 99 4 °F (37 4 °C)     FHR: 130, reactive  [place]: q 4 min    Gen: NAD  Abd: s/NT/gravid  Extrem: no edema  SVE: closed/50/-3    Labs:   No visits with results within 1 Day(s) from this visit  Latest known visit with results is:   Routine Prenatal on 2023   Component Date Value   • POCT URINE PROTEIN 2023 1    • GLUCOSE, UA 2023 negative      A/P  28 y o  female [id number] at 37w3d with c/o contractions  H/o 2 prior   No cervical change  Will hydrate with IVF as she has been having diarrhea and vomiting  Reassess after fluid bolus        Bautista Leroy MD   OB-GYN  2023 8:49   Resp (!) 16   Ht 4' 5.5\" (1.359 m)   Wt 27.9 kg   BMI 15.11 kg/m²     General: Alert, cooperative and well-appearing.  HEENT:  Normocephalic and atraumatic; mucous membranes are moist without oropharyngeal erythema.  There is no tonsillar hypertrophy or exudates present. Ears are normal set. Tympanic membranes are clear bilaterally without erythema or fluid present. Nasal septum is midline. She does not have any nasal congestion or discharge at this time.  Neck:  Nontender. There is no cervical lymphadenopathy.  Lungs: Easy respiratory effort. Clear to auscultation bilaterally without wheezes rales or rhonchi.  Cardiovascular:  Heart rate is regular without murmur.  Abdomen:  Soft, nontender and non-distended.  She has moderate firm stool palpable in the left lower quadrant and suprapubic area  Extremities:  Warm and well-perfused. She has a patch of dry skin on the dorsal surface of there left wrist and hand without erythema present.    ASSESSMENT/PLAN:     1. Chronic constipation    2. Need for vaccination    3. Dry skin dermatitis          Orders Placed This Encounter   • HPV 9-Valent Vacc (Gardasil 9) - [id number]   • Influenza Quadrivalent Split Pres Free 0.5 mL Pre-filled Vacc (Flulaval, Fluzone, Fluarix; ages 6+ months) - [id number]   • polyethylene glycol (MIRALAX) 17 GM/SCOOP powder     Miralax (Polyethylene glycol) 1 capful mixed with 8 ounces of fluid twice a day for 3 days and then decrease to once a day.    Plan to continue Miralax for at least a month until a better habit has been developed for passing stools on a regular basis.    Increase non-caffeine beverages to at least 6 8-ounce cups per day.    Scheduled attempts to pass bowel movements within 30 minutes of breakfast, lunch and supper to get into the habit of passing regular bowel movements and stop the cycle of stool holding.    Increase fiber in the diet.  See handout for fiber sources.    Apply Vaseline petroleum jelly or solid shortening as a  PM moisturizer to her hands each night before bed and then put on some gloves to keep from getting it on everything else.    Return in about 2 weeks (around 3/12/2021) for well child exam.

## 2023-04-24 PROBLEM — Z3A.37 37 WEEKS GESTATION OF PREGNANCY: Status: ACTIVE | Noted: 2022-11-16

## 2023-04-24 LAB
ERYTHROCYTE [DISTWIDTH] IN BLOOD BY AUTOMATED COUNT: 17.1 % (ref 11.6–15.1)
HCT VFR BLD AUTO: 26.6 % (ref 34.8–46.1)
HGB BLD-MCNC: 8.3 G/DL (ref 11.5–15.4)
MCH RBC QN AUTO: 25.5 PG (ref 26.8–34.3)
MCHC RBC AUTO-ENTMCNC: 31.2 G/DL (ref 31.4–37.4)
MCV RBC AUTO: 82 FL (ref 82–98)
PLATELET # BLD AUTO: 150 THOUSANDS/UL (ref 149–390)
PMV BLD AUTO: 11.9 FL (ref 8.9–12.7)
RBC # BLD AUTO: 3.25 MILLION/UL (ref 3.81–5.12)
WBC # BLD AUTO: 9.29 THOUSAND/UL (ref 4.31–10.16)

## 2023-04-24 RX ADMIN — ACETAMINOPHEN 325MG 650 MG: 325 TABLET ORAL at 00:13

## 2023-04-24 RX ADMIN — ACETAMINOPHEN 325MG 650 MG: 325 TABLET ORAL at 04:17

## 2023-04-24 RX ADMIN — AMOXICILLIN 500 MG: 500 CAPSULE ORAL at 06:18

## 2023-04-24 RX ADMIN — IBUPROFEN 600 MG: 600 TABLET, FILM COATED ORAL at 19:59

## 2023-04-24 RX ADMIN — SIMETHICONE 80 MG: 80 TABLET, CHEWABLE ORAL at 20:02

## 2023-04-24 RX ADMIN — ACETAMINOPHEN 325MG 650 MG: 325 TABLET ORAL at 23:47

## 2023-04-24 RX ADMIN — KETOROLAC TROMETHAMINE 30 MG: 30 INJECTION, SOLUTION INTRAMUSCULAR; INTRAVENOUS at 00:12

## 2023-04-24 RX ADMIN — DOCUSATE SODIUM 100 MG: 100 CAPSULE, LIQUID FILLED ORAL at 18:21

## 2023-04-24 RX ADMIN — AMOXICILLIN 500 MG: 500 CAPSULE ORAL at 21:59

## 2023-04-24 RX ADMIN — SIMETHICONE 80 MG: 80 TABLET, CHEWABLE ORAL at 08:32

## 2023-04-24 RX ADMIN — SIMETHICONE 80 MG: 80 TABLET, CHEWABLE ORAL at 00:18

## 2023-04-24 RX ADMIN — KETOROLAC TROMETHAMINE 30 MG: 30 INJECTION, SOLUTION INTRAMUSCULAR; INTRAVENOUS at 13:57

## 2023-04-24 RX ADMIN — ACETAMINOPHEN 325MG 650 MG: 325 TABLET ORAL at 15:36

## 2023-04-24 RX ADMIN — AMOXICILLIN 500 MG: 500 CAPSULE ORAL at 13:58

## 2023-04-24 RX ADMIN — ACETAMINOPHEN 325MG 650 MG: 325 TABLET ORAL at 19:59

## 2023-04-24 RX ADMIN — DOCUSATE SODIUM 100 MG: 100 CAPSULE, LIQUID FILLED ORAL at 08:32

## 2023-04-24 RX ADMIN — KETOROLAC TROMETHAMINE 30 MG: 30 INJECTION, SOLUTION INTRAMUSCULAR; INTRAVENOUS at 06:18

## 2023-04-24 NOTE — PROGRESS NOTES
Progress Note - OB/GYN  Post-Partum Physician Note   Lorrie Rock 28 y o  female MRN: [id number]  Unit/Bed#: -01 Encounter: [id number]    Patient is postop and postpartum day 1 from a  (repeat x 2) with spinal anesthesia      Subjective:   Pain: yes  Tolerating Oral Intake:2 yes  Voiding: yes  Flatus: yes  Bowel Movement: no  Ambulating: yes  Breastfeeding: Breastfeeding  Chest Pain: no  Shortness of Breath: no  Leg Pain/Discomfort: no  Lochia: minimal  Other:     Objective:   Vitals:    23 1923 23 0013 23 0400 23 0700   BP: 108/63 100/61 121/74 109/69   BP Location: Right arm Right arm Right arm Right arm   Pulse: 56 61 60 62   Resp: 18 18 18 16   Temp: 97 6 °F (36 4 °C) 97 7 °F (36 5 °C)  98 4 °F (36 9 °C)   TempSrc: Oral Oral  Temporal   SpO2: 98% 98% 99% 100%       Intake/Output Summary (Last 24 hours) at 2023 1023  Last data filed at 2023 1915  Gross per 24 hour   Intake --   Output 1195 ml   Net -1195 ml       Physical Exam:  General: in no apparent distress, well developed and well nourished, non-toxic, in no respiratory distress and acyanotic, alert, oriented times 3, afebrile and normal vitals  Abdomen: abdomen is soft without significant tenderness, masses, organomegaly or guarding  Fundus: Firm and appropriately tender to palpation, 1 below the umbilicus  Incision: C/D/I  Lower extremeties: nontender      Labs/Tests:   Recent Results (from the past 24 hour(s))   CBC    Collection Time: 23  6:15 AM   Result Value Ref Range    WBC 9 29 4 31 - 10 16 Thousand/uL    RBC 3 25 (L) 3 81 - 5 12 Million/uL    Hemoglobin 8 3 (L) 11 5 - 15 4 g/dL    Hematocrit 26 6 (L) 34 8 - 46 1 %    MCV 82 82 - 98 fL    MCH 25 5 (L) 26 8 - 34 3 pg    MCHC 31 2 (L) 31 4 - 37 4 g/dL    RDW 17 1 (H) 11 6 - 15 1 %    Platelets 988 357 - 420 Thousands/uL    MPV 11 9 8 9 - 12 7 fL         Brief OB Lab review:  ABO Grouping   Date Value Ref Range Status   2023 A Final      Rh Factor   Date Value Ref Range Status   04/22/2023 Positive  Final     Rh Type   Date Value Ref Range Status   10/24/2022 Positive  Final     Comment:     Please note: Prior records for this patient's ABO / Rh type are not  available for additional verification  No results found for: ANTIBODYSCR  No results found for: RUBM    MEDS:   Current Facility-Administered Medications   Medication Dose Route Frequency   • acetaminophen (TYLENOL) tablet 650 mg  650 mg Oral Q4H PRN   • amoxicillin (AMOXIL) capsule 500 mg  500 mg Oral Q8H   • benzocaine-menthol-lanolin-aloe (DERMOPLAST) 20-0 5 % topical spray 1 application  1 application  Topical Q6H PRN   • calcium carbonate (TUMS) chewable tablet 1,000 mg  1,000 mg Oral Daily PRN   • diphenhydrAMINE (BENADRYL) injection 25 mg  25 mg Intravenous Q6H PRN   • docusate sodium (COLACE) capsule 100 mg  100 mg Oral BID   • fentaNYL (SUBLIMAZE) injection 50 mcg  50 mcg Intravenous Q3 min PRN   • hydrocortisone 1 % cream 1 application  1 application  Topical Daily PRN   • HYDROmorphone (DILAUDID) injection 0 5 mg  0 5 mg Intravenous Q5 Min PRN   • HYDROmorphone (DILAUDID) injection 0 5 mg  0 5 mg Intravenous Q2H PRN   • ketorolac (TORADOL) injection 30 mg  30 mg Intravenous Q6H    Followed by   • ibuprofen (MOTRIN) tablet 600 mg  600 mg Oral Q6H   • lactated ringers infusion  100 mL/hr Intravenous Continuous   • nalbuphine (NUBAIN) injection 5 mg  5 mg Intravenous Q4H PRN   • ondansetron (ZOFRAN) injection 4 mg  4 mg Intravenous Q8H PRN   • pseudoephedrine (SUDAFED) tablet 60 mg  60 mg Oral Q6H PRN   • simethicone (MYLICON) chewable tablet 80 mg  80 mg Oral Q6H PRN   • witch hazel-glycerin (TUCKS) topical pad 1 pad  1 pad   Topical Q4H PRN     Invasive Devices     Peripheral Intravenous Line  Duration           Peripheral IV 04/22/23 Right Forearm 1 day                Assessment and Plan:  28y o  year-old [id number], postpartum day 1 status-post repeat LTCS    Continue routine postpartum care  Encourage ambulation  Advance diet as tolerated      No problem-specific Assessment & Plan notes found for this encounter        Gregory Van MD

## 2023-04-24 NOTE — LACTATION NOTE
This note was copied from a baby's chart  CONSULT - LACTATION  Baby Boy [facility]) Lamine 1 days male MRN: [id number]    [place]  NURSERY Room / Bed: (N)/ 203(N) Encounter: [id number]    Maternal Information     MOTHER:  Jose Winslow  Maternal Age: 28 y o    OB History: # 1 - Date: None, Sex: None, Weight: None, GA: None, Delivery: None, Apgar1: None, Apgar5: None, Living: None, Birth Comments: None    # 2 - Date: 20, Sex: Female, Weight: 2296 g (5 lb 1 oz), GA: 38w3d, Delivery: , Low Transverse, Apgar1: None, Apgar5: None, Living: Living, Birth Comments: Apgars 9/9/[place] @ 36 wks    # 3 - Date: 22, Sex: Female, Weight: 2670 g (5 lb 14 2 oz), GA: 38w0d, Delivery: , Low Transverse, Apgar1: 9, Apgar5: 9, Living: Living, Birth Comments: Neonatology present at birth in [place]    # 4 - Date: 23, Sex: Male, Weight: 3070 g (6 lb 12 3 oz), GA: 37w4d, Delivery: None, Apgar1: 9, Apgar5: 9, Living: Living, Birth Comments: Dr Emeterio Johnson present in [place] for delivery   Previouse breast reduction surgery?  No    Lactation history:   Has patient previously breast fed: Yes   How long had patient previously breast fed: 6 months with first and second child   Previous breast feeding complications:       Past Surgical History:   Procedure Laterality Date   •  SECTION      2   • COLONOSCOPY     • [place]  DELIVERY ONLY N/A 2022    Procedure: Brandy Guajardo () REPEAT;  Surgeon: Nancy Jarvis DO;  Location: [facility];  Service: Obstetrics   • TONSILLECTOMY     • [address] EXTRACTION          Birth information:  YOB: 2024   Time of birth: 12:12 AM   Sex: male   Delivery type:     Birth Weight: 3070 g (6 lb 12 3 oz)   Percent of Weight Change: -3%     Gestational Age: 37w1d   [unfilled]    Assessment     Breast and nipple assessment: normal assessment with short, everted nipple    [place] "Assessment: normal assessment    Feeding assessment: feeding well  LATCH:  Latch: Grasps breast, tongue down, lips flanged, rhythmic sucking   Audible Swallowing: A few with stimulation   Type of Nipple: Everted (After stimulation)   Comfort (Breast/Nipple): Soft/non-tender   Hold (Positioning): Partial assist, teach one side, mother does other, staff holds   [facility] Score: 8          Feeding recommendations:  breast feed on demand     Met with parents to discuss feeding plan  Mother is breastfeeding and ahs been using the nipple shield with last feedings  Baby is currently at a 3 4% weight loss, having appropriate output and 24 hour bilirubin was in the high intermediate range  The Ready, Set, Baby Booklet was discussed  Discussed importance of skin to skin to help baby awaken for breastfeeding, to help with milk production as well as stabilize temperature, blood sugars, decrease pain, promote relaxation, and calm the baby as well as for bonding that father may do as well  Showed images of tummy size progression as milk production increases to meet the nutritional/growing needs of the baby and risks associated with introducing early supplementation that is not medically indicated  Discussed alternative feeding methods as a manner to provide baby with additional colostrum/breast milk if baby is sleepy and/or unable to breastfeed directly to help protect the milk supply and preserve latching abilities at the breast  Mother was encouraged to hand express after feedings to provide \"dessert\" and when sleepy to hand express to provide \"snacks\" which could help baby to awaken for a feeding  Discussed “Second Night Syndrome” explaining how baby’s cluster feeds to meet growing needs  Growth spurts periods were discussed within the first year and how cluster feeding helps boost milk supply      Explained feeding cues and fullness cues as well as importance of obtaining a deep latch for effective milk removal and proper " positioning (tummy to tummy, at level, nose to nipple, bring chin to breast first and bringing baby to breast) with ear, shoulder, and hip alignment  Demonstrated on breast model how to hold, compress and perform hand expression  Mother expressed a few drops that were given via suck training and muscle exercises  No restriction noted  Mother was assisted in latching without nipple shield and baby fed 5 minutes, coming off and staying skin to skin  Mother reported comfort during feeding  Addressed breast pump needs and mother discussed that she has a breast pump for home use  Discussed as well importance to pump with use of nipple shield to protect supply  Parents were made aware of how to communicate with lactation and encouraged to reach out continued support and/or questions that arise        Kitty Sheehan RN 4/24/2023 1:36 PM

## 2023-04-25 ENCOUNTER — TELEPHONE (OUTPATIENT)
Dept: LABOR AND DELIVERY | Facility: HOSPITAL | Age: 28
End: 2023-04-25

## 2023-04-25 VITALS
BODY MASS INDEX: 25.67 KG/M2 | WEIGHT: 119 LBS | HEART RATE: 59 BPM | RESPIRATION RATE: 16 BRPM | DIASTOLIC BLOOD PRESSURE: 68 MMHG | SYSTOLIC BLOOD PRESSURE: 114 MMHG | TEMPERATURE: 97.8 F | HEIGHT: 57 IN | OXYGEN SATURATION: 97 %

## 2023-04-25 RX ORDER — OXYCODONE HYDROCHLORIDE 5 MG/1
5 TABLET ORAL EVERY 4 HOURS PRN
Status: DISCONTINUED | OUTPATIENT
Start: 2023-04-25 | End: 2023-04-25 | Stop reason: HOSPADM

## 2023-04-25 RX ORDER — FERROUS SULFATE 137(45) MG
1 TABLET, EXTENDED RELEASE ORAL DAILY
Refills: 0
Start: 2023-04-25

## 2023-04-25 RX ORDER — OXYCODONE HYDROCHLORIDE 5 MG/1
5 TABLET ORAL EVERY 4 HOURS PRN
Qty: 5 TABLET | Refills: 0 | Status: SHIPPED | OUTPATIENT
Start: 2023-04-25 | End: 2023-04-30

## 2023-04-25 RX ORDER — ACETAMINOPHEN 325 MG/1
650 TABLET ORAL EVERY 4 HOURS PRN
Refills: 0
Start: 2023-04-25

## 2023-04-25 RX ORDER — DOCUSATE SODIUM 100 MG/1
100 CAPSULE, LIQUID FILLED ORAL 2 TIMES DAILY PRN
Refills: 0
Start: 2023-04-25

## 2023-04-25 RX ORDER — IBUPROFEN 200 MG
600 TABLET ORAL EVERY 6 HOURS
Start: 2023-04-25

## 2023-04-25 RX ADMIN — IBUPROFEN 600 MG: 600 TABLET, FILM COATED ORAL at 08:48

## 2023-04-25 RX ADMIN — ACETAMINOPHEN 325MG 650 MG: 325 TABLET ORAL at 05:56

## 2023-04-25 RX ADMIN — AMOXICILLIN 500 MG: 500 CAPSULE ORAL at 05:56

## 2023-04-25 RX ADMIN — ACETAMINOPHEN 325MG 650 MG: 325 TABLET ORAL at 11:10

## 2023-04-25 RX ADMIN — IBUPROFEN 600 MG: 600 TABLET, FILM COATED ORAL at 02:27

## 2023-04-25 RX ADMIN — DOCUSATE SODIUM 100 MG: 100 CAPSULE, LIQUID FILLED ORAL at 08:48

## 2023-04-25 RX ADMIN — OXYCODONE HYDROCHLORIDE 5 MG: 5 TABLET ORAL at 08:58

## 2023-04-25 NOTE — PLAN OF CARE
Problem: PAIN - ADULT  Goal: Verbalizes/displays adequate comfort level or baseline comfort level  Description: Interventions:  - Encourage patient to monitor pain and request assistance  - Assess pain using appropriate pain scale  - Administer analgesics based on type and severity of pain and evaluate response  - Implement non-pharmacological measures as appropriate and evaluate response  - Consider cultural and social influences on pain and pain management  - Notify physician/advanced practitioner if interventions unsuccessful or patient reports new pain  Outcome: Progressing     Problem: INFECTION - ADULT  Goal: Absence or prevention of progression during hospitalization  Description: INTERVENTIONS:  - Assess and monitor for signs and symptoms of infection  - Monitor lab/diagnostic results  - Monitor all insertion sites, i e  indwelling lines, tubes, and drains  - Monitor endotracheal if appropriate and nasal secretions for changes in amount and color  - [place] appropriate cooling/warming therapies per order  - Administer medications as ordered  - Instruct and encourage patient and family to use good hand hygiene technique  - Identify and instruct in appropriate isolation precautions for identified infection/condition  Outcome: Progressing  Goal: Absence of fever/infection during neutropenic period  Description: INTERVENTIONS:  - Monitor WBC    Outcome: Progressing     Problem: SAFETY ADULT  Goal: Patient will remain free of falls  Description: INTERVENTIONS:  - Educate patient/family on patient safety including physical limitations  - Instruct patient to call for assistance with activity   - Consult OT/PT to assist with strengthening/mobility   - Keep Call bell within reach  - Keep bed low and locked with side rails adjusted as appropriate  - Keep care items and personal belongings within reach  - Initiate and maintain comfort rounds  - Make Fall Risk Sign visible to staff  - Apply yellow socks and bracelet for high fall risk patients  - Consider moving patient to room near nurses station  Outcome: Progressing  Goal: Maintain or return to baseline ADL function  Description: INTERVENTIONS:  -  Assess patient's ability to carry out ADLs; assess patient's baseline for ADL function and identify physical deficits which impact ability to perform ADLs (bathing, care of mouth/teeth, toileting, grooming, dressing, etc )  - Assess/evaluate cause of self-care deficits   - Assess range of motion  - Assess patient's mobility; develop plan if impaired  - Assess patient's need for assistive devices and provide as appropriate  - Encourage maximum independence but intervene and supervise when necessary  - Involve family in performance of ADLs  - Assess for home care needs following discharge   - Consider OT consult to assist with ADL evaluation and planning for discharge  - Provide patient education as appropriate  Outcome: Progressing  Goal: Maintains/Returns to pre admission functional level  Description: INTERVENTIONS:  - Perform BMAT or MOVE assessment daily    - Set and communicate daily mobility goal to care team and patient/family/caregiver  - Collaborate with rehabilitation services on mobility goals if consulted  - Out of bed for toileting  - Record patient progress and toleration of activity level   Outcome: Progressing     Problem: Knowledge Deficit  Goal: Patient/family/caregiver demonstrates understanding of disease process, treatment plan, medications, and discharge instructions  Description: Complete learning assessment and assess knowledge base    Interventions:  - Provide teaching at level of understanding  - Provide teaching via preferred learning methods  Outcome: Progressing     Problem: DISCHARGE PLANNING  Goal: Discharge to home or other facility with appropriate resources  Description: INTERVENTIONS:  - Identify barriers to discharge w/patient and caregiver  - Arrange for needed discharge resources and transportation as appropriate  - Identify discharge learning needs (meds, wound care, etc )  - Arrange for interpretive services to assist at discharge as needed  - Refer to Case Management Department for coordinating discharge planning if the patient needs post-hospital services based on physician/advanced practitioner order or complex needs related to functional status, cognitive ability, or social support system  Outcome: Progressing     Problem: POSTPARTUM  Goal: Experiences normal postpartum course  Description: INTERVENTIONS:  - Monitor maternal vital signs  - Assess uterine involution and lochia  Outcome: Progressing  Goal: Appropriate maternal -  bonding  Description: INTERVENTIONS:  - Identify family support  - Assess for appropriate maternal/infant bonding   -Encourage maternal/infant bonding opportunities  - Referral to  or  as needed  Outcome: Progressing  Goal: Establishment of infant feeding pattern  Description: INTERVENTIONS:  - Assess breast/bottle feeding  - Refer to lactation as needed  Outcome: Progressing  Goal: Incision(s), wounds(s) or drain site(s) healing without S/S of infection  Description: INTERVENTIONS  - Assess and document dressing, incision, wound bed, drain sites and surrounding tissue  - Provide patient and family education    Outcome: Progressing

## 2023-04-25 NOTE — LACTATION NOTE
This note was copied from a baby's chart  CONSULT - LACTATION  Baby Boy Humberto Raman 2 days male MRN: [id number]    [facility] NURSERY Room / Bed: (N)/(N) Encounter: [id number]    Maternal Information     MOTHER:  Claudis Boas  Maternal Age: 28 y o    OB History: # 1 - Date: None, Sex: None, Weight: None, GA: None, Delivery: None, Apgar1: None, Apgar5: None, Living: None, Birth Comments: None    # 2 - Date: 20, Sex: Female, Weight: 2296 g (5 lb 1 oz), GA: 38w3d, Delivery: , Low Transverse, Apgar1: None, Apgar5: None, Living: Living, Birth Comments: Apgars 9/9/Breech @ 36 wks    # 3 - Date: 22, Sex: Female, Weight: 2670 g (5 lb 14 2 oz), GA: 38w0d, Delivery: , Low Transverse, Apgar1: 9, Apgar5: 9, Living: Living, Birth Comments: Neonatology present at birth in [address] [place] [address]    # 4 - Date: 23, Sex: Male, Weight: 3070 g (6 lb 12 3 oz), GA: 37w4d, Delivery: None, Apgar1: 9, Apgar5: 9, Living: Living, Birth Comments: Dr Rizwan Hughes present in [address] [place] [address] for delivery   Previouse breast reduction surgery?  No    Lactation history:   Has patient previously breast fed: Yes   How long had patient previously breast fed: 6 months with first and second child   Previous breast feeding complications:       Past Surgical History:   Procedure Laterality Date   •  SECTION      2   • COLONOSCOPY     • ID  DELIVERY ONLY N/A 2022    Procedure:  SECTION () REPEAT;  Surgeon: Lani Crump DO;  Location: [facility];  Service: Obstetrics   • ID  DELIVERY ONLY N/A 2023    Procedure: Amrita  () REPEAT;  Surgeon: Lani Crump DO;  Location: [facility];  Service: Obstetrics   • TONSILLECTOMY     • [address] EXTRACTION          Birth information:  YOB: 2024   Time of birth: 12:12 AM   Sex: male   Delivery type:     Birth Weight: 3070 g (6 lb 12 3 oz)   Percent of Weight Change: -7%     Gestational Age: 37w1d   [unfilled]    Assessment     Breast and nipple assessment: filling, normal appearance    [place] Assessment: sleepy    Feeding assessment: feeding well  LATCH:  Latch: Grasps breast, tongue down, lips flanged, rhythmic sucking   Audible Swallowing: A few with stimulation   Type of Nipple: Everted (After stimulation)   Comfort (Breast/Nipple): Filling, red/small blisters/bruises, mild/moderate discomfort   Hold (Positioning): Partial assist, teach one side, mother does other, staff holds   [facility] Score: 7          Feeding recommendations:  breast feed on demand     Met with parents to follow up and discuss the Breastfeeding Discharge Booklet  Baby is currently at a 6 8*% weight loss, having appropriate output for his age and repeat bilirubin in the low intermediate range  Mother has been latching without the nipple shield, using the latch assist on the right side and has been doing well  Mother was assisted in waking baby for a feeding during this encounter, baby fed around 10 minutes on the right side receiving expressed breast milk from the latch assist cup prior to latching (around 3 ml) and then latched on the left side  Mother was given a manual hand pump to use to pump for comfort (relieve pressure) between feedings  Showed the feeding log to could be continued using once home for up to the week and discussed the importance of ensuring that baby feeds 8-12x in 24 hours and that baby has 6 wet diapers or more that are becoming more dilute as well as soiled diapers that are transitioning demonstrated by color change from meconium to a yellow/gold seedy loose stool by day 5  Mother was given resources to look up medications to ensure they are safe with breastfeeding, by communicating with the [employer], [address] as well as using [employer] (assisted mother to pin to home screen on personal phone)      Discussed engorgement time frame (when mature milk comes in) and management as well as how to deal with conditions that may occur while breastfeeding (plugged ducts, milk blebs and mastitis) and when is appropriate to communicate with her OB/GYN and/or a lactation consultant  Mother comfortable with how to set up a pump, how to cycle (stimulation vs expression phases during a pumping session), importance of flange fit and trying different sizes to ensure best fit, milk storage and how to properly clean parts  Mother was shown handouts for tips on pumping when returning to work and paced bottle feeding that was discussed and demonstrated  Mother was shown community resources for continued support in breastfeeding once discharged home  She was encouraged to communicate with [address] for lactation home visits and/or with her baby's pediatrician for lactation support/services that could be offered in the practice or close to home  Parents were encouraged to call for further questions that arise prior to discharge      Ankur Arguello RN 4/25/2023 10:43 AM

## 2023-04-25 NOTE — PLAN OF CARE
Problem: PAIN - ADULT  Goal: Verbalizes/displays adequate comfort level or baseline comfort level  Description: Interventions:  - Encourage patient to monitor pain and request assistance  - Assess pain using appropriate pain scale  - Administer analgesics based on type and severity of pain and evaluate response  - Implement non-pharmacological measures as appropriate and evaluate response  - Consider cultural and social influences on pain and pain management  - Notify physician/advanced practitioner if interventions unsuccessful or patient reports new pain  4/25/2023 1210 by Gautam Rivera RN  Outcome: Completed  4/25/2023 0935 by Gautam Rivera RN  Outcome: Adequate for Discharge     Problem: INFECTION - ADULT  Goal: Absence or prevention of progression during hospitalization  Description: INTERVENTIONS:  - Assess and monitor for signs and symptoms of infection  - Monitor lab/diagnostic results  - Monitor all insertion sites, i e  indwelling lines, tubes, and drains  - Monitor endotracheal if appropriate and nasal secretions for changes in amount and color  - [place] appropriate cooling/warming therapies per order  - Administer medications as ordered  - Instruct and encourage patient and family to use good hand hygiene technique  - Identify and instruct in appropriate isolation precautions for identified infection/condition  4/25/2023 1210 by Gautam Rivera RN  Outcome: Completed  4/25/2023 0935 by Gautam Rivera RN  Outcome: Adequate for Discharge  Goal: Absence of fever/infection during neutropenic period  Description: INTERVENTIONS:  - Monitor WBC    4/25/2023 1210 by Gautam Rivera RN  Outcome: Completed  4/25/2023 0935 by Gautam Rivera RN  Outcome: Adequate for Discharge     Problem: SAFETY ADULT  Goal: Patient will remain free of falls  Description: INTERVENTIONS:  - Educate patient/family on patient safety including physical limitations  - Instruct patient to call for assistance with activity   - Consult OT/PT to assist with strengthening/mobility   - Keep Call bell within reach  - Keep bed low and locked with side rails adjusted as appropriate  - Keep care items and personal belongings within reach  - Initiate and maintain comfort rounds    4/25/2023 1210 by Kisha Celaya RN  Outcome: Completed  4/25/2023 0935 by Kisha Celaya RN  Outcome: Adequate for Discharge  Goal: Maintain or return to baseline ADL function  Description: INTERVENTIONS:  -  Assess patient's ability to carry out ADLs; assess patient's baseline for ADL function and identify physical deficits which impact ability to perform ADLs (bathing, care of mouth/teeth, toileting, grooming, dressing, etc )  - Assess/evaluate cause of self-care deficits   - Assess range of motion  - Assess patient's mobility; develop plan if impaired  - Assess patient's need for assistive devices and provide as appropriate  - Encourage maximum independence but intervene and supervise when necessary  - Involve family in performance of ADLs  - Assess for home care needs following discharge   - Consider OT consult to assist with ADL evaluation and planning for discharge  - Provide patient education as appropriate  4/25/2023 1210 by Kisha Celaya RN  Outcome: Completed  4/25/2023 0935 by Kisha Celaya RN  Outcome: Adequate for Discharge  Goal: Maintains/Returns to pre admission functional level  Description: INTERVENTIONS:  - Perform BMAT or MOVE assessment daily    - Set and communicate daily mobility goal to care team and patient/family/caregiver  4/25/2023 1210 by Kisha Celaya RN  Outcome: Completed  4/25/2023 0935 by Kisha Celaya RN  Outcome: Adequate for Discharge     Problem: Knowledge Deficit  Goal: Patient/family/caregiver demonstrates understanding of disease process, treatment plan, medications, and discharge instructions  Description: Complete learning assessment and assess knowledge base    Interventions:  - Provide teaching at level of understanding  - Provide teaching via preferred learning methods  20230 by Spring Ramos RN  Outcome: Completed  2023 by Spring Ramos RN  Outcome: Adequate for Discharge     Problem: DISCHARGE PLANNING  Goal: Discharge to home or other facility with appropriate resources  Description: INTERVENTIONS:  - Identify barriers to discharge w/patient and caregiver  - Arrange for needed discharge resources and transportation as appropriate  - Identify discharge learning needs (meds, wound care, etc )  - Arrange for interpretive services to assist at discharge as needed  - Refer to Case Management Department for coordinating discharge planning if the patient needs post-hospital services based on physician/advanced practitioner order or complex needs related to functional status, cognitive ability, or social support system  2023 by Spring Ramos RN  Outcome: Completed  2023 by Spring Ramos RN  Outcome: Adequate for Discharge     Problem: POSTPARTUM  Goal: Experiences normal postpartum course  Description: INTERVENTIONS:  - Monitor maternal vital signs  - Assess uterine involution and lochia  2023 1210 by Spring Ramos RN  Outcome: Completed  2023 by Spring Ramos RN  Outcome: Adequate for Discharge  Goal: Appropriate maternal -  bonding  Description: INTERVENTIONS:  - Identify family support  - Assess for appropriate maternal/infant bonding   -Encourage maternal/infant bonding opportunities  - Referral to  or  as needed  20230 by Spring Ramos RN  Outcome: Completed  2023 by Spring Ramos RN  Outcome: Adequate for Discharge  Goal: Establishment of infant feeding pattern  Description: INTERVENTIONS:  - Assess breast/bottle feeding  - Refer to lactation as needed  2023 1210 by Spring Ramos RN  Outcome: Completed  2023 by Spring Ramos RN  Outcome: Adequate for Discharge  Goal: Incision(s), wounds(s) or drain site(s) healing without S/S of infection  Description: INTERVENTIONS  - Assess and document dressing, incision, wound bed, drain sites and surrounding tissue  - Provide patient and family education    4/25/2023 1210 by Tatiana Ortega RN  Outcome: Completed  4/25/2023 0935 by Tatiana Ortega RN  Outcome: Adequate for Discharge

## 2023-04-25 NOTE — DISCHARGE SUMMARY
Discharge Summary - OB/GYN   Vidhi Cotter 28 y o  female MRN: [id number]  Unit/Bed#: -01 Encounter: [id number]      Admission Date: 2023     Discharge Date: 23    Principal Diagnosis: Spontaneous labor, history of  delivery, [id number] Pregnancy at 37w4d    Secondary Diagnosis: n/a    Procedures: repeat  section, low transverse incision    Attending - Delivery:  Nati, DO         - Discharge: Jannelle Skiff, MD    Hospital Course:     Vidhi Cotter is a 28 y o  [id number] at 37w4d wks who was initially admitted for painful contractions  She delivered a viable  on 2023 at 00:12  Weight 3070 grams via repeat  section, low transverse incision  Apgars were 9 (1 min) and 9 (5 min)   was transferred to  nursery  Patient tolerated the procedure well and was transferred to recovery in stable condition  Her postpartum course was uncomplicated  Preoperative hemoglobin was 9 1, postoperative was 8 3  Her postoperative pain was well controlled with oral analgesics  She was started on oral iron supplementation at the time of discharge to home  On day of discharge, she was ambulating and able to reasonably perform all ADLs  She was voiding and had appropriate bowel function  Pain was well controlled  She was discharged home on post-operative day #2 without complications  Patient was instructed to follow up with her OB as an outpatient and was given appropriate warnings to call provider if she develops signs of infection or uncontrolled pain  Complications: none apparent    Condition at discharge: good     Discharge instructions/Information to patient and family:   See after visit summary for information provided to patient and family  Provisions for Follow-Up Care:  See after visit summary for information related to follow-up care and any pertinent home health orders        Disposition: Home    Planned Readmission: No    Discharge Medications: For a complete list of the patient's medications, please refer to her med rec      Andrew Jovel MD  4/25/2023 12:09 PM

## 2023-04-25 NOTE — ASSESSMENT & PLAN NOTE
- Hgb 8 3 post-operatively from 9 1 pre-op  She received 5 doses of IV Venofer antepartum  We reviewed option for additional IV Venofer during admission versus discharge to home on oral iron supplementation  She desires oral iron supplementation, which she will take for at least 6 weeks  ----- Message from Maribel Calabrese sent at 6/29/2017  4:59 PM CDT -----  Contact: Pt:[phone]  Pt called and states he is having severe pain post surgery and is having diarrhea and he would like to speak with the nurse.

## 2023-04-25 NOTE — TELEPHONE ENCOUNTER
Patient is being discharged to home today from [facility] following repeat  section on 2023  Her hospital course was uncomplicated  She should be scheduled for routine postpartum care in office      Manuel Murphy MD  2023 12:13 PM

## 2023-04-25 NOTE — PROGRESS NOTES
"Progress Note - OB/GYN  Post-Partum Physician Note   Maryam King 28 y o  female MRN: [id number]  Unit/Bed#:  203- Encounter: [id number]  Assessment:  28y o  year-old B7H2067, post-op/postpartum day #2 status-post repeat low-transverse  section    Plan:  Continue routine postpartum care  Encourage ambulation    Short interval between pregnancies affecting pregnancy, antepartum  - She has had had two consecutive short-interval pregnancies and three  deliveries since   Contraceptive plan reviewed today  We discussed recommendation to delay conception for at least 18 months in order to reduce risk of  delivery, fetal growth restriction, and uterine rupture  She is considering her options, but is leaning toward starting progestin-only pill  I encouraged her to consider LARC options  Will discuss further at the time of her postpartum visit  Iron deficiency anemia  - Hgb 8 3 post-operatively from 9 1 pre-op  She received 5 doses of IV Venofer antepartum  We reviewed option for additional IV Venofer during admission versus discharge to home on oral iron supplementation  She desires oral iron supplementation, which she will take for at least 6 weeks       ______________________________________________    Patient is postop and postpartum day #2 following repeat  section     Subjective:   Pain: Adequately controlled  Tolerating Oral Intake: Yes  Voiding: Yes  Ambulating: Yes  Breastfeeding: Yes  Chest Pain: No  Shortness of Breath: No  Leg Pain/Discomfort: No  Lochia: Normal    Objective:   Vitals:    23 1500 23 0009 23 0100 23 0730   BP: 117/66 116/62  138/94   BP Location: Right arm Right arm  Right arm   Pulse: 68 59  63   Resp: 16 18  16   Temp: 99 °F (37 2 °C) 98 °F (36 7 °C)  98 1 °F (36 7 °C)   TempSrc: Temporal Oral  Temporal   SpO2: 98% 95%  99%   Weight:   54 kg (119 lb)    Height:   4' 9\" (1 448 m)      No intake or output data in the " 24 hours ending 04/25/23 0914    Physical Exam:  General: in no apparent distress, alert, oriented times 3 and afebrile  Abdomen: abdomen is soft without significant tenderness, masses, organomegaly or guarding  Fundus: Firm and non-tender, 1 below the umbilicus  Incision: C/D/I  Lower extremeties: non-tender    Labs/Tests:   Lab Results   Component Value Date/Time    HGB 8 3 (L) 04/24/2023 06:15 AM    HGB 9 1 (L) 04/22/2023 09:01 PM     04/24/2023 06:15 AM     04/22/2023 09:01 PM    WBC 9 29 04/24/2023 06:15 AM    WBC 11 95 (H) 04/22/2023 09:01 PM    CREATININE 0 55 (L) 04/22/2023 09:01 PM    ALT 10 04/22/2023 09:01 PM    AST 19 04/22/2023 09:01 PM        Brief OB Lab review:  ABO Grouping   Date Value Ref Range Status   04/22/2023 A  Final      Rh Factor   Date Value Ref Range Status   04/22/2023 Positive  Final     Rh Type   Date Value Ref Range Status   10/24/2022 Positive  Final     Comment:     Please note: Prior records for this patient's ABO / Rh type are not  available for additional verification  No results found for: ANTIBODYSCR  No results found for: RUBM    MEDS:   Current Facility-Administered Medications   Medication Dose Route Frequency   • acetaminophen (TYLENOL) tablet 650 mg  650 mg Oral Q4H PRN   • amoxicillin (AMOXIL) capsule 500 mg  500 mg Oral Q8H   • benzocaine-menthol-lanolin-aloe (DERMOPLAST) 20-0 5 % topical spray 1 application  1 application  Topical Q6H PRN   • calcium carbonate (TUMS) chewable tablet 1,000 mg  1,000 mg Oral Daily PRN   • diphenhydrAMINE (BENADRYL) injection 25 mg  25 mg Intravenous Q6H PRN   • docusate sodium (COLACE) capsule 100 mg  100 mg Oral BID   • fentaNYL (SUBLIMAZE) injection 50 mcg  50 mcg Intravenous Q3 min PRN   • hydrocortisone 1 % cream 1 application  1 application   Topical Daily PRN   • ibuprofen (MOTRIN) tablet 600 mg  600 mg Oral Q6H   • lactated ringers infusion  100 mL/hr Intravenous Continuous   • nalbuphine (NUBAIN) injection 5 mg  5 mg Intravenous Q4H PRN   • ondansetron (ZOFRAN) injection 4 mg  4 mg Intravenous Q8H PRN   • oxyCODONE (ROXICODONE) IR tablet 5 mg  5 mg Oral Q4H PRN   • pseudoephedrine (SUDAFED) tablet 60 mg  60 mg Oral Q6H PRN   • simethicone (MYLICON) chewable tablet 80 mg  80 mg Oral Q6H PRN   • witch hazel-glycerin (TUCKS) topical pad 1 pad  1 pad   Topical Q4H PRN     Invasive Devices     Peripheral Intravenous Line  Duration           Peripheral IV 04/22/23 Right Forearm 2 days                  Esdras Mae MD  4/25/2023 9:14 AM

## 2023-04-25 NOTE — ASSESSMENT & PLAN NOTE
- She has had had two consecutive short-interval pregnancies and three  deliveries since   Contraceptive plan reviewed today  We discussed recommendation to delay conception for at least 18 months in order to reduce risk of  delivery, fetal growth restriction, and uterine rupture  She is considering her options, but is leaning toward starting progestin-only pill  I encouraged her to consider LARC options  Will discuss further at the time of her postpartum visit

## 2023-04-25 NOTE — PLAN OF CARE
Problem: PAIN - ADULT  Goal: Verbalizes/displays adequate comfort level or baseline comfort level  Description: Interventions:  - Encourage patient to monitor pain and request assistance  - Assess pain using appropriate pain scale  - Administer analgesics based on type and severity of pain and evaluate response  - Implement non-pharmacological measures as appropriate and evaluate response  - Consider cultural and social influences on pain and pain management  - Notify physician/advanced practitioner if interventions unsuccessful or patient reports new pain  Outcome: Adequate for Discharge     Problem: INFECTION - ADULT  Goal: Absence or prevention of progression during hospitalization  Description: INTERVENTIONS:  - Assess and monitor for signs and symptoms of infection  - Monitor lab/diagnostic results  - Monitor all insertion sites, i e  indwelling lines, tubes, and drains  - Monitor endotracheal if appropriate and nasal secretions for changes in amount and color  - [place] appropriate cooling/warming therapies per order  - Administer medications as ordered  - Instruct and encourage patient and family to use good hand hygiene technique  - Identify and instruct in appropriate isolation precautions for identified infection/condition  Outcome: Adequate for Discharge  Goal: Absence of fever/infection during neutropenic period  Description: INTERVENTIONS:  - Monitor WBC    Outcome: Adequate for Discharge     Problem: SAFETY ADULT  Goal: Patient will remain free of falls  Description: INTERVENTIONS:  - Educate patient/family on patient safety including physical limitations  - Instruct patient to call for assistance with activity   - Consult OT/PT to assist with strengthening/mobility   - Keep Call bell within reach  - Keep bed low and locked with side rails adjusted as appropriate  - Keep care items and personal belongings within reach  - Initiate and maintain comfort rounds    Outcome: Adequate for Discharge  Goal: Maintain or return to baseline ADL function  Description: INTERVENTIONS:  -  Assess patient's ability to carry out ADLs; assess patient's baseline for ADL function and identify physical deficits which impact ability to perform ADLs (bathing, care of mouth/teeth, toileting, grooming, dressing, etc )  - Assess/evaluate cause of self-care deficits   - Assess range of motion  - Assess patient's mobility; develop plan if impaired  - Assess patient's need for assistive devices and provide as appropriate  - Encourage maximum independence but intervene and supervise when necessary  - Involve family in performance of ADLs  - Assess for home care needs following discharge   - Consider OT consult to assist with ADL evaluation and planning for discharge  - Provide patient education as appropriate  Outcome: Adequate for Discharge  Goal: Maintains/Returns to pre admission functional level  Description: INTERVENTIONS:  - Perform BMAT or MOVE assessment daily    - Set and communicate daily mobility goal to care team and patient/family/caregiver  Outcome: Adequate for Discharge     Problem: Knowledge Deficit  Goal: Patient/family/caregiver demonstrates understanding of disease process, treatment plan, medications, and discharge instructions  Description: Complete learning assessment and assess knowledge base    Interventions:  - Provide teaching at level of understanding  - Provide teaching via preferred learning methods  Outcome: Adequate for Discharge     Problem: DISCHARGE PLANNING  Goal: Discharge to home or other facility with appropriate resources  Description: INTERVENTIONS:  - Identify barriers to discharge w/patient and caregiver  - Arrange for needed discharge resources and transportation as appropriate  - Identify discharge learning needs (meds, wound care, etc )  - Arrange for interpretive services to assist at discharge as needed  - Refer to Case Management Department for coordinating discharge planning if the patient needs post-hospital services based on physician/advanced practitioner order or complex needs related to functional status, cognitive ability, or social support system  Outcome: Adequate for Discharge     Problem: POSTPARTUM  Goal: Experiences normal postpartum course  Description: INTERVENTIONS:  - Monitor maternal vital signs  - Assess uterine involution and lochia  Outcome: Adequate for Discharge  Goal: Appropriate maternal -  bonding  Description: INTERVENTIONS:  - Identify family support  - Assess for appropriate maternal/infant bonding   -Encourage maternal/infant bonding opportunities  - Referral to  or  as needed  Outcome: Adequate for Discharge  Goal: Establishment of infant feeding pattern  Description: INTERVENTIONS:  - Assess breast/bottle feeding  - Refer to lactation as needed  Outcome: Adequate for Discharge  Goal: Incision(s), wounds(s) or drain site(s) healing without S/S of infection  Description: INTERVENTIONS  - Assess and document dressing, incision, wound bed, drain sites and surrounding tissue  - Provide patient and family education    Outcome: Adequate for Discharge

## 2023-05-01 LAB — PLACENTA IN STORAGE: NORMAL

## 2023-05-06 PROBLEM — O23.13 ACUTE CYSTITIS DURING PREGNANCY IN THIRD TRIMESTER: Status: RESOLVED | Noted: 2023-03-07 | Resolved: 2023-05-06

## 2023-05-08 ENCOUNTER — TELEPHONE (OUTPATIENT)
Dept: OBGYN CLINIC | Facility: CLINIC | Age: 28
End: 2023-05-08

## 2023-05-09 PROBLEM — B95.1 GROUP B STREPTOCOCCUS URINARY TRACT INFECTION AFFECTING PREGNANCY: Status: RESOLVED | Noted: 2023-03-10 | Resolved: 2023-05-09

## 2023-05-09 PROBLEM — O23.40 GROUP B STREPTOCOCCUS URINARY TRACT INFECTION AFFECTING PREGNANCY: Status: RESOLVED | Noted: 2023-03-10 | Resolved: 2023-05-09

## 2023-07-03 ENCOUNTER — POSTPARTUM VISIT (OUTPATIENT)
Dept: OBGYN CLINIC | Facility: CLINIC | Age: 28
End: 2023-07-03

## 2023-07-03 VITALS
DIASTOLIC BLOOD PRESSURE: 58 MMHG | BODY MASS INDEX: 21.58 KG/M2 | HEIGHT: 58 IN | WEIGHT: 102.8 LBS | SYSTOLIC BLOOD PRESSURE: 98 MMHG

## 2023-07-03 DIAGNOSIS — Z09 POSTOPERATIVE EXAMINATION: ICD-10-CM

## 2023-07-03 DIAGNOSIS — Z30.8 ENCOUNTER FOR OTHER CONTRACEPTIVE MANAGEMENT: ICD-10-CM

## 2023-07-03 PROCEDURE — 99024 POSTOP FOLLOW-UP VISIT: CPT | Performed by: OBSTETRICS & GYNECOLOGY

## 2023-07-03 NOTE — PROGRESS NOTES
Routine Post Partum Visit  [address] [place] [address], [address], [facility], [address] [facility],8W    Assessment/Plan:  Zachary Zuniga is a 28y.o. year old [id number] who presents for postpartum visit. Routine Postpartum Care  1. Normal postpartum exam  2. Contraception: condoms  3. Depression Screen: PPD screen score: 2; negative  4. Feeding:  She is breast feeding exclusively. 5. Psychosocial support: present  6. Cervical cancer screening Up to Date  7. Follow up in: 3 months for wellness visit, or as needed. Additional Problems:  1. Postpartum exam    2. Postoperative examination    3. Encounter for other contraceptive management      -  Patient plans to use condoms PRN, declines any other contraceptions    Next visit: 3 months Wellness    Subjective:     CC: Postpartum visit    Alisa Levy is a 28 y.o. y.o. female [id number] who presents for a postpartum visit. She is 6 weeks postpartum following a low transverse  section on 23 at 37.4 weeks. Outcome: repeat  section, low transverse incision, n/a  Anesthesia: spinal. Postpartum course has been uncomplicated. Baby's course has been uncomplicated. Baby is feeding by She is breast feeding exclusively. .     Bleeding no bleeding. Bowel function is normal. Bladder function is normal. Patient is not sexually active since delivery. Contraception method is condoms. Postpartum depression screening: negative.     The following portions of the patient's history were reviewed and updated as appropriate: allergies, current medications, past family history, past medical history, obstetric history, gynecologic history, past social history, past surgical history and problem list.      Objective:  BP 98/58   Ht 4' 9.75" (1.467 m)   Wt 46.6 kg (102 lb 12.8 oz)   Breastfeeding Yes   BMI 21.67 kg/m²   Pregravid Weight/BMI: No episode found (BMI Could not be calculated)  Current Weight: 46.6 kg (102 lb 12.8 oz)   Total Weight Gain: Not found.     General: Well appearing, no distress. Mood and affect: Appropriate.   Abdomen: Soft, nontender  Incision: c/d/i  Vulva: normal  Vagina: normal, no abnormal discharge  Cervix: closed, no bleeding  Uterus: non-tender, normal size  Adnexa: no masses, no tenderness

## 2023-10-02 ENCOUNTER — ANNUAL EXAM (OUTPATIENT)
Dept: OBGYN CLINIC | Facility: CLINIC | Age: 28
End: 2023-10-02
Payer: COMMERCIAL

## 2023-10-02 VITALS
BODY MASS INDEX: 20.93 KG/M2 | SYSTOLIC BLOOD PRESSURE: 86 MMHG | WEIGHT: 97 LBS | DIASTOLIC BLOOD PRESSURE: 48 MMHG | HEIGHT: 57 IN

## 2023-10-02 DIAGNOSIS — B36.9 FUNGAL DERMATITIS: ICD-10-CM

## 2023-10-02 DIAGNOSIS — R10.2 PELVIC PAIN IN FEMALE: ICD-10-CM

## 2023-10-02 DIAGNOSIS — Z01.419 ENCOUNTER FOR ANNUAL ROUTINE GYNECOLOGICAL EXAMINATION: Primary | ICD-10-CM

## 2023-10-02 PROCEDURE — 99395 PREV VISIT EST AGE 18-39: CPT | Performed by: STUDENT IN AN ORGANIZED HEALTH CARE EDUCATION/TRAINING PROGRAM

## 2023-10-02 RX ORDER — CLOTRIMAZOLE 1 %
CREAM (GRAM) TOPICAL 2 TIMES DAILY
Qty: 12 G | Refills: 0 | Status: SHIPPED | OUTPATIENT
Start: 2023-10-02
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
## 2023-10-11 DIAGNOSIS — Z13.29 SCREENING FOR THYROID DISORDER: Primary | ICD-10-CM

## 2023-10-12 LAB — TSH SERPL DL<=0.005 MIU/L-ACNC: 1.17 UIU/ML (ref 0.45–4.5)

## 2023-10-17 ENCOUNTER — HOSPITAL ENCOUNTER (OUTPATIENT)
Dept: ULTRASOUND IMAGING | Facility: HOSPITAL | Age: 28
Discharge: HOME | End: 2023-10-17
Attending: STUDENT IN AN ORGANIZED HEALTH CARE EDUCATION/TRAINING PROGRAM
Payer: COMMERCIAL

## 2023-10-17 DIAGNOSIS — R10.2 PELVIC PAIN IN FEMALE: ICD-10-CM

## 2023-10-17 PROCEDURE — 76856 US EXAM PELVIC COMPLETE: CPT

## 2023-10-17 PROCEDURE — 76830 TRANSVAGINAL US NON-OB: CPT

## 2023-10-23 DIAGNOSIS — R53.83 OTHER FATIGUE: Primary | ICD-10-CM

## 2023-10-23 DIAGNOSIS — Z13.0 SCREENING, ANEMIA, DEFICIENCY, IRON: ICD-10-CM

## 2023-11-06 ENCOUNTER — TELEPHONE (OUTPATIENT)
Dept: OBGYN CLINIC | Facility: CLINIC | Age: 28
End: 2023-11-06

## 2023-11-06 NOTE — TELEPHONE ENCOUNTER
Alex trujillo/niles she thinks she is having a miscarriage.  Return call to pat Ellison on v/m to return call to emergency line and ask to speak with nurse

## 2023-11-06 NOTE — TELEPHONE ENCOUNTER
Return call from Hans longoria who started bleeding Thursday evening-3 days late for menses. She had to wear her pp diapers for 24 hours and changing every 2 hours. Bleeding decreased Saturday-cramping pain, fatigue and headache persisted. Slept through the day. Does not typically experience with normal period. Symptoms improving today, bleeding decreased. Had not taken pregnancy test. Has not taken ibuprofen-typically prefers to wait it out to resolve on own. Recommended try ibuprofen 600mg TID to reduce cramping. Ibuprofen also helps with heavy bleeding. Can check HPT but at this point since 3 days late for menses and heavy bleeding since Thursday evening unlikely to have positive result. Continue to monitor bleeding and call back with additional questions/concerns. Patient verbalized understanding and agreement. Dr. Makenna Christiansen, please advise if any additional recommendations.

## 2024-05-16 ENCOUNTER — HOSPITAL ENCOUNTER (EMERGENCY)
Facility: HOSPITAL | Age: 29
Discharge: HOME | End: 2024-05-16
Attending: EMERGENCY MEDICINE
Payer: COMMERCIAL

## 2024-05-16 ENCOUNTER — APPOINTMENT (EMERGENCY)
Dept: CT IMAGING | Facility: HOSPITAL | Age: 29
End: 2024-05-16
Payer: COMMERCIAL

## 2024-05-16 VITALS
RESPIRATION RATE: 17 BRPM | TEMPERATURE: 98.1 F | HEIGHT: 57 IN | BODY MASS INDEX: 21.14 KG/M2 | SYSTOLIC BLOOD PRESSURE: 110 MMHG | HEART RATE: 75 BPM | WEIGHT: 98 LBS | DIASTOLIC BLOOD PRESSURE: 60 MMHG | OXYGEN SATURATION: 99 %

## 2024-05-16 DIAGNOSIS — K52.9 ENTERITIS: ICD-10-CM

## 2024-05-16 DIAGNOSIS — R81 GLUCOSURIA: ICD-10-CM

## 2024-05-16 DIAGNOSIS — R10.9 ABDOMINAL PAIN: Primary | ICD-10-CM

## 2024-05-16 LAB
ALBUMIN SERPL BCP-MCNC: 4.6 G/DL (ref 3.5–5)
ALP SERPL-CCNC: 32 U/L (ref 34–104)
ALT SERPL W P-5'-P-CCNC: 19 U/L (ref 7–52)
ANION GAP SERPL CALCULATED.3IONS-SCNC: 7 MMOL/L (ref 4–13)
AST SERPL W P-5'-P-CCNC: 28 U/L (ref 13–39)
BACTERIA UR QL AUTO: ABNORMAL /HPF
BASOPHILS # BLD AUTO: 0.02 THOUSANDS/ÂΜL (ref 0–0.1)
BASOPHILS NFR BLD AUTO: 1 % (ref 0–1)
BILIRUB SERPL-MCNC: 0.73 MG/DL (ref 0.2–1)
BILIRUB UR QL STRIP: NEGATIVE
BUN SERPL-MCNC: 4 MG/DL (ref 5–25)
CALCIUM SERPL-MCNC: 9.3 MG/DL (ref 8.4–10.2)
CHLORIDE SERPL-SCNC: 105 MMOL/L (ref 96–108)
CLARITY UR: CLEAR
CO2 SERPL-SCNC: 26 MMOL/L (ref 21–32)
COLOR UR: YELLOW
CREAT SERPL-MCNC: 0.7 MG/DL (ref 0.6–1.3)
EOSINOPHIL # BLD AUTO: 0.04 THOUSAND/ÂΜL (ref 0–0.61)
EOSINOPHIL NFR BLD AUTO: 1 % (ref 0–6)
ERYTHROCYTE [DISTWIDTH] IN BLOOD BY AUTOMATED COUNT: 11.9 % (ref 11.6–15.1)
EXT PREGNANCY TEST URINE: NEGATIVE
EXT. CONTROL: NORMAL
GFR SERPL CREATININE-BSD FRML MDRD: 117 ML/MIN/1.73SQ M
GLUCOSE SERPL-MCNC: 91 MG/DL (ref 65–140)
GLUCOSE UR STRIP-MCNC: ABNORMAL MG/DL
HCT VFR BLD AUTO: 36.2 % (ref 34.8–46.1)
HGB BLD-MCNC: 11.8 G/DL (ref 11.5–15.4)
HGB UR QL STRIP.AUTO: ABNORMAL
IMM GRANULOCYTES # BLD AUTO: 0.01 THOUSAND/UL (ref 0–0.2)
IMM GRANULOCYTES NFR BLD AUTO: 0 % (ref 0–2)
KETONES UR STRIP-MCNC: NEGATIVE MG/DL
LEUKOCYTE ESTERASE UR QL STRIP: NEGATIVE
LIPASE SERPL-CCNC: 10 U/L (ref 11–82)
LYMPHOCYTES # BLD AUTO: 1.19 THOUSANDS/ÂΜL (ref 0.6–4.47)
LYMPHOCYTES NFR BLD AUTO: 32 % (ref 14–44)
MCH RBC QN AUTO: 26.6 PG (ref 26.8–34.3)
MCHC RBC AUTO-ENTMCNC: 32.6 G/DL (ref 31.4–37.4)
MCV RBC AUTO: 82 FL (ref 82–98)
MONOCYTES # BLD AUTO: 0.33 THOUSAND/ÂΜL (ref 0.17–1.22)
MONOCYTES NFR BLD AUTO: 9 % (ref 4–12)
MUCOUS THREADS UR QL AUTO: ABNORMAL
NEUTROPHILS # BLD AUTO: 2.18 THOUSANDS/ÂΜL (ref 1.85–7.62)
NEUTS SEG NFR BLD AUTO: 57 % (ref 43–75)
NITRITE UR QL STRIP: NEGATIVE
NON-SQ EPI CELLS URNS QL MICRO: ABNORMAL /HPF
NRBC BLD AUTO-RTO: 0 /100 WBCS
PH UR STRIP.AUTO: 6 [PH]
PLATELET # BLD AUTO: 207 THOUSANDS/UL (ref 149–390)
PMV BLD AUTO: 12.1 FL (ref 8.9–12.7)
POTASSIUM SERPL-SCNC: 3.3 MMOL/L (ref 3.5–5.3)
PROT SERPL-MCNC: 7.3 G/DL (ref 6.4–8.4)
PROT UR STRIP-MCNC: ABNORMAL MG/DL
RBC # BLD AUTO: 4.43 MILLION/UL (ref 3.81–5.12)
RBC #/AREA URNS AUTO: ABNORMAL /HPF
SODIUM SERPL-SCNC: 138 MMOL/L (ref 135–147)
SP GR UR STRIP.AUTO: 1.02 (ref 1–1.03)
UROBILINOGEN UR STRIP-ACNC: 2 MG/DL
WBC # BLD AUTO: 3.77 THOUSAND/UL (ref 4.31–10.16)
WBC #/AREA URNS AUTO: ABNORMAL /HPF

## 2024-05-16 PROCEDURE — 74177 CT ABD & PELVIS W/CONTRAST: CPT

## 2024-05-16 PROCEDURE — 81001 URINALYSIS AUTO W/SCOPE: CPT | Performed by: EMERGENCY MEDICINE

## 2024-05-16 PROCEDURE — 36415 COLL VENOUS BLD VENIPUNCTURE: CPT

## 2024-05-16 PROCEDURE — 99285 EMERGENCY DEPT VISIT HI MDM: CPT

## 2024-05-16 PROCEDURE — 83690 ASSAY OF LIPASE: CPT | Performed by: EMERGENCY MEDICINE

## 2024-05-16 PROCEDURE — 80053 COMPREHEN METABOLIC PANEL: CPT | Performed by: EMERGENCY MEDICINE

## 2024-05-16 PROCEDURE — 81025 URINE PREGNANCY TEST: CPT | Performed by: EMERGENCY MEDICINE

## 2024-05-16 PROCEDURE — 99284 EMERGENCY DEPT VISIT MOD MDM: CPT

## 2024-05-16 PROCEDURE — 85025 COMPLETE CBC W/AUTO DIFF WBC: CPT | Performed by: EMERGENCY MEDICINE

## 2024-05-16 RX ORDER — POTASSIUM CHLORIDE 20 MEQ/1
40 TABLET, EXTENDED RELEASE ORAL ONCE
Status: COMPLETED | OUTPATIENT
Start: 2024-05-16 | End: 2024-05-16

## 2024-05-16 RX ADMIN — POTASSIUM CHLORIDE 40 MEQ: 1500 TABLET, EXTENDED RELEASE ORAL at 16:47

## 2024-05-16 RX ADMIN — IOHEXOL 90 ML: 350 INJECTION, SOLUTION INTRAVENOUS at 15:09

## 2024-05-16 NOTE — DISCHARGE INSTRUCTIONS
Follow up with your PCP for small amount of glucose in your urine.   Return for worsening of symptoms.  Utilize supportive measures. Keep a bland diet.

## 2024-05-16 NOTE — ED PROVIDER NOTES
"History  Chief Complaint   Patient presents with    Abdominal Pain     Pt to er with reports of abdominal pain for the past couple days. Had blood work done at [place] and got a call from her pcp stating that her inflammatory markers in her blood work showed that she needed a cat scan     Patient reports that for the past couple days she has been having looser stools, and has had abdominal pain.  Patient states that originally the pain was generalized, and is now having left upper quadrant and right lower quadrant pain.  Patient states that she is not currently pregnant.  Patient denies any vomiting.  Patient states that she started with cold-like symptoms and her PCP saw her, ordered blood work and \"a marker was elevated so they told me to come get a CT scan.\"  Patient denies urinary symptoms.         Prior to Admission Medications   Prescriptions Last Dose Informant Patient Reported? Taking?   clotrimazole (LOTRIMIN) 1 % cream   No No   Sig: Apply topically 2 (two) times a day   polyethylene glycol (MIRALAX) 17 g packet  Self Yes No   Sig: Take 17 g by mouth if needed      Facility-Administered Medications: None       Past Medical History:   Diagnosis Date    Anemia     GERD (gastroesophageal reflux disease)     Headache, migraine     Hemorrhoids     Irritable bowel syndrome     Ovarian cyst, left     Papanicolaou smear 2019    Stomach ulcer     Syncope        Past Surgical History:   Procedure Laterality Date     SECTION      x3    COLONOSCOPY      CT  DELIVERY ONLY N/A 2022    Procedure:  SECTION () REPEAT;  Surgeon: Fatemeh Gutierrez DO;  Location: [facility];  Service: Obstetrics    CT  DELIVERY ONLY N/A 2023    Procedure:  SECTION () REPEAT;  Surgeon: Fatemeh Gutierrez DO;  Location: [facility];  Service: Obstetrics    TONSILLECTOMY  2002    WISDOM TOOTH EXTRACTION  2012       Family History   Adopted: Yes   Problem Relation Age of Onset    No Known " Problems Sister     Breast cancer Neg Hx     Colon cancer Neg Hx     Ovarian cancer Neg Hx      I have reviewed and agree with the history as documented.    E-Cigarette/Vaping    E-Cigarette Use Never User      E-Cigarette/Vaping Substances    Nicotine No     THC No     CBD No     Flavoring No     Other No     Unknown No      Social History     Tobacco Use    Smoking status: Never     Passive exposure: Never    Smokeless tobacco: Never   Vaping Use    Vaping status: Never Used   Substance Use Topics    Alcohol use: Never    Drug use: Never       Review of Systems   Constitutional: Negative.    HENT: Negative.     Eyes: Negative.    Respiratory: Negative.     Cardiovascular: Negative.    Gastrointestinal:  Positive for abdominal pain, diarrhea and nausea.   Endocrine: Negative.    Genitourinary: Negative.    Musculoskeletal: Negative.    Skin: Negative.    Allergic/Immunologic: Negative.    Neurological: Negative.    Hematological: Negative.    Psychiatric/Behavioral: Negative.     All other systems reviewed and are negative.      Physical Exam  Physical Exam  Vitals and nursing note reviewed.   Constitutional:       Appearance: She is well-developed and normal weight.   HENT:      Head: Normocephalic.      Mouth/Throat:      Mouth: Mucous membranes are moist.   Eyes:      Extraocular Movements: Extraocular movements intact.   Cardiovascular:      Rate and Rhythm: Normal rate and regular rhythm.      Heart sounds: Normal heart sounds.   Pulmonary:      Effort: Pulmonary effort is normal.      Breath sounds: Normal breath sounds.   Abdominal:      General: Abdomen is flat. Bowel sounds are normal.      Palpations: Abdomen is soft.      Tenderness: There is abdominal tenderness in the right lower quadrant and left upper quadrant.   Skin:     General: Skin is warm.      Capillary Refill: Capillary refill takes less than 2 seconds.   Neurological:      General: No focal deficit present.      Mental Status: She is alert  and oriented to person, place, and time.   Psychiatric:         Mood and Affect: Mood normal.         Behavior: Behavior normal.         Vital Signs  ED Triage Vitals [05/16/24 1200]   Temperature Pulse Respirations Blood Pressure SpO2   98.1 °F (36.7 °C) 69 18 109/56 98 %      Temp Source Heart Rate Source Patient Position - Orthostatic VS BP Location FiO2 (%)   Temporal Monitor Sitting Right arm --      Pain Score       --           Vitals:    05/16/24 1200 05/16/24 1430 05/16/24 1630   BP: 109/56 115/58 110/60   Pulse: 69 70 75   Patient Position - Orthostatic VS: Sitting Sitting          Visual Acuity      ED Medications  Medications   iohexol (OMNIPAQUE) 350 MG/ML injection (MULTI-DOSE) 90 mL (90 mL Intravenous Given 5/16/24 1509)   potassium chloride (Klor-Con M20) CR tablet 40 mEq (40 mEq Oral Given 5/16/24 1647)       Diagnostic Studies  Results Reviewed       Procedure Component Value Units Date/Time    Urine Microscopic [[id number]]  (Abnormal) Collected: 05/16/24 1420    Lab Status: Final result Specimen: Urine, Clean Catch Updated: 05/16/24 1445     RBC, UA 2-4 /hpf      WBC, UA None Seen /hpf      Epithelial Cells Moderate /hpf      Bacteria, UA Occasional /hpf      MUCUS THREADS Occasional    UA w Reflex to Microscopic w Reflex to Culture [[id number]]  (Abnormal) Collected: 05/16/24 1420    Lab Status: Final result Specimen: Urine, Clean Catch Updated: 05/16/24 1439     Color, UA Yellow     Clarity, UA Clear     Specific Gravity, UA 1.025     pH, UA 6.0     Leukocytes, UA Negative     Nitrite, UA Negative     Protein, UA 30 (1+) mg/dl      Glucose, UA 30 (3/100%) mg/dl      Ketones, UA Negative mg/dl      Urobilinogen, UA 2.0 mg/dl      Bilirubin, UA Negative     Occult Blood, UA Small    POCT pregnancy, urine [[id number]]  (Normal) Resulted: 05/16/24 1418    Lab Status: Final result Specimen: Urine Updated: 05/16/24 1419     EXT Preg Test, Ur Negative     Control Valid    Comprehensive metabolic panel  [[id number]]  (Abnormal) Collected: 05/16/24 1203    Lab Status: Final result Specimen: Blood from Arm, Left Updated: 05/16/24 1229     Sodium 138 mmol/L      Potassium 3.3 mmol/L      Chloride 105 mmol/L      CO2 26 mmol/L      ANION GAP 7 mmol/L      BUN 4 mg/dL      Creatinine 0.70 mg/dL      Glucose 91 mg/dL      Calcium 9.3 mg/dL      AST 28 U/L      ALT 19 U/L      Alkaline Phosphatase 32 U/L      Total Protein 7.3 g/dL      Albumin 4.6 g/dL      Total Bilirubin 0.73 mg/dL      eGFR 117 ml/min/1.73sq m     Narrative:      National Kidney Disease Foundation guidelines for Chronic Kidney Disease (CKD):     Stage 1 with normal or high GFR (GFR > 90 mL/min/1.73 square meters)    Stage 2 Mild CKD (GFR = 60-89 mL/min/1.73 square meters)    Stage 3A Moderate CKD (GFR = 45-59 mL/min/1.73 square meters)    Stage 3B Moderate CKD (GFR = 30-44 mL/min/1.73 square meters)    Stage 4 Severe CKD (GFR = 15-29 mL/min/1.73 square meters)    Stage 5 End Stage CKD (GFR <15 mL/min/1.73 square meters)  Note: GFR calculation is accurate only with a steady state creatinine    Lipase [[id number]]  (Abnormal) Collected: 05/16/24 1203    Lab Status: Final result Specimen: Blood from Arm, Left Updated: 05/16/24 1229     Lipase 10 u/L     CBC and differential [[id number]]  (Abnormal) Collected: 05/16/24 1203    Lab Status: Final result Specimen: Blood from Arm, Left Updated: 05/16/24 1215     WBC 3.77 Thousand/uL      RBC 4.43 Million/uL      Hemoglobin 11.8 g/dL      Hematocrit 36.2 %      MCV 82 fL      MCH 26.6 pg      MCHC 32.6 g/dL      RDW 11.9 %      MPV 12.1 fL      Platelets 207 Thousands/uL      nRBC 0 /100 WBCs      Segmented % 57 %      Immature Grans % 0 %      Lymphocytes % 32 %      Monocytes % 9 %      Eosinophils Relative 1 %      Basophils Relative 1 %      Absolute Neutrophils 2.18 Thousands/µL      Absolute Immature Grans 0.01 Thousand/uL      Absolute Lymphocytes 1.19 Thousands/µL      Absolute Monocytes 0.33 Thousand/µL       Eosinophils Absolute 0.04 Thousand/µL      Basophils Absolute 0.02 Thousands/µL                    CT abdomen pelvis with contrast   Final Result by Bruno Marvin MD (05/16 1610)      Potential mild enteritis without other acute findings; the appendix is noninflamed.      The study was marked in EPIC for immediate notification.         Workstation performed: [id number]                    Procedures  Procedures         ED Course                               SBIRT 20yo+      Flowsheet Row Most Recent Value   Initial Alcohol Screen: US AUDIT-C     1. How often do you have a drink containing alcohol? 0 Filed at: 05/16/2024 1201   2. How many drinks containing alcohol do you have on a typical day you are drinking?  0 Filed at: 05/16/2024 1201   3a. Male UNDER 65: How often do you have five or more drinks on one occasion? 0 Filed at: 05/16/2024 1201   3b. FEMALE Any Age, or MALE 65+: How often do you have 4 or more drinks on one occassion? 0 Filed at: 05/16/2024 1201   Audit-C Score 0 Filed at: 05/16/2024 1201   KAZ: How many times in the past year have you...    Used an illegal drug or used a prescription medication for non-medical reasons? Never Filed at: 05/16/2024 1201                      Medical Decision Making  DDx: appendicitis, electrolyte abnormality, raghav   Patient presenting with localized abdominal pain in RLQ, and LUQ. Patient has history of IBS. FN protocol utilized in WR. K replaced. No leukocytosis, no anemia. Noted glucose in urine, however, no hyperglycemia on BMP. Patient declines pain medication. Pregnancy test negative. CT findings appreciated, and discussed enteritis with the patient. Discussed supportive care. Aware to follow up with PCP who sent her to ED, and with GI. Patient stable for discharge. Tolerated PO with med admin. Reviewed glucose finding in urine, and aware to follow up with PCP for this as well.   Reviewed reasons to return to ed.  Patient verbalized  understanding of diagnosis and agreement with discharge plan of care as well as understanding of reasons to return to ed.      Amount and/or Complexity of Data Reviewed  Labs: ordered.  Radiology: ordered.    Risk  Prescription drug management.             Disposition  Final diagnoses:   Abdominal pain   Enteritis   Glucosuria     Time reflects when diagnosis was documented in both MDM as applicable and the Disposition within this note       Time User Action Codes Description Comment    5/16/2024  4:14 PM Rae Julian Add [R10.9] Abdominal pain     5/16/2024  4:14 PM Rae Julian Add [K52.9] Enteritis     5/16/2024  4:15 PM Rae Julian Add [R81] Glucosuria           ED Disposition       ED Disposition   Discharge    Condition   Stable    Date/Time   Thu May 16, 2024 1631    Comment   Alex Raman discharge to home/self care.                   Follow-up Information       Follow up With Specialties Details Why Contact Info Additional Information    Jung Martinez Nurse Practitioner Schedule an appointment as soon as possible for a visit in 2 days For further evaluation of symptoms [address] [address]  [phone]        [facility] Emergency Medicine Go to  If symptoms worsen [address] [facility] [address] [phone] [facility], [address] [place], [place] [address]            Discharge Medication List as of 5/16/2024  4:31 PM        CONTINUE these medications which have NOT CHANGED    Details   clotrimazole (LOTRIMIN) 1 % cream Apply topically 2 (two) times a day, Starting Mon 10/2/2023, Normal      polyethylene glycol (MIRALAX) 17 g packet Take 17 g by mouth if needed, Historical Med             No discharge procedures on file.    PDMP Review         Value Time User    PDMP Reviewed  Yes 3/7/2023 11:53 AM Basilio Phillips  MD [place]            ED Provider  Electronically Signed by             LELIA Lewis  05/18/24 0036

## 2024-09-05 ENCOUNTER — HOSPITAL ENCOUNTER (EMERGENCY)
Facility: HOSPITAL | Age: 29
Discharge: HOME | End: 2024-09-06
Attending: EMERGENCY MEDICINE
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
## 2024-09-05 DIAGNOSIS — T71.193A ASSAULT BY MANUAL STRANGULATION: ICD-10-CM

## 2024-09-05 DIAGNOSIS — T74.21XA SEXUAL ASSAULT OF ADULT, INITIAL ENCOUNTER: ICD-10-CM

## 2024-09-05 DIAGNOSIS — T07.XXXA ABRASIONS OF MULTIPLE SITES: ICD-10-CM

## 2024-09-05 PROCEDURE — 81025 URINE PREGNANCY TEST: CPT | Performed by: EMERGENCY MEDICINE

## 2024-09-05 PROCEDURE — 99284 EMERGENCY DEPT VISIT MOD MDM: CPT

## 2024-09-06 ENCOUNTER — APPOINTMENT (EMERGENCY)
Dept: CT IMAGING | Facility: HOSPITAL | Age: 29
End: 2024-09-06

## 2024-09-06 ENCOUNTER — TELEPHONE (OUTPATIENT)
Dept: EMERGENCY DEPT | Facility: HOSPITAL | Age: 29
End: 2024-09-06

## 2024-09-06 VITALS
TEMPERATURE: 98.6 F | OXYGEN SATURATION: 99 % | SYSTOLIC BLOOD PRESSURE: 130 MMHG | DIASTOLIC BLOOD PRESSURE: 80 MMHG | HEART RATE: 89 BPM | RESPIRATION RATE: 18 BRPM

## 2024-09-06 LAB
ALBUMIN SERPL BCG-MCNC: 4.2 G/DL (ref 3.5–5)
ALP SERPL-CCNC: 28 U/L (ref 34–104)
ALT SERPL W P-5'-P-CCNC: 8 U/L (ref 7–52)
ANION GAP SERPL CALCULATED.3IONS-SCNC: 5 MMOL/L (ref 4–13)
AST SERPL W P-5'-P-CCNC: 12 U/L (ref 13–39)
BILIRUB SERPL-MCNC: 0.55 MG/DL (ref 0.2–1)
BILIRUB UR QL STRIP: NEGATIVE
BUN SERPL-MCNC: 9 MG/DL (ref 5–25)
CALCIUM SERPL-MCNC: 8.7 MG/DL (ref 8.4–10.2)
CHLORIDE SERPL-SCNC: 107 MMOL/L (ref 96–108)
CLARITY UR: CLEAR
CO2 SERPL-SCNC: 22 MMOL/L (ref 21–32)
COLOR UR: YELLOW
CREAT SERPL-MCNC: 0.67 MG/DL (ref 0.6–1.3)
EXT PREGNANCY TEST URINE: NEGATIVE
EXT. CONTROL: NORMAL
GFR SERPL CREATININE-BSD FRML MDRD: 118 ML/MIN/1.73SQ M
GLUCOSE SERPL-MCNC: 100 MG/DL (ref 65–140)
GLUCOSE UR STRIP-MCNC: NEGATIVE MG/DL
HBV SURFACE AB SER-ACNC: 74 MIU/ML
HBV SURFACE AG SER QL: NORMAL
HCV AB SER QL: NORMAL
HGB UR QL STRIP.AUTO: NEGATIVE
HIV 1+2 AB+HIV1 P24 AG SERPL QL IA: NORMAL
HIV 1+2 AB+HIV1 P24 AG SERPL QL IA: NORMAL
HIV 2 AB SERPL QL IA: NORMAL
HIV1 AB SERPL QL IA: NORMAL
HIV1 P24 AG SER QL: NORMAL
HIV1 P24 AG SERPL QL IA: NORMAL
KETONES UR STRIP-MCNC: NEGATIVE MG/DL
LEUKOCYTE ESTERASE UR QL STRIP: NEGATIVE
NITRITE UR QL STRIP: NEGATIVE
PH UR STRIP.AUTO: 7 [PH]
POTASSIUM SERPL-SCNC: 3.7 MMOL/L (ref 3.5–5.3)
PROT SERPL-MCNC: 6.4 G/DL (ref 6.4–8.4)
PROT UR STRIP-MCNC: NEGATIVE MG/DL
SODIUM SERPL-SCNC: 134 MMOL/L (ref 135–147)
SP GR UR STRIP.AUTO: 1.02 (ref 1–1.03)
TREPONEMA PALLIDUM IGG+IGM AB [PRESENCE] IN SERUM OR PLASMA BY IMMUNOASSAY: NORMAL
UROBILINOGEN UR STRIP-ACNC: <2 MG/DL

## 2024-09-06 PROCEDURE — 87806 HIV AG W/HIV1&2 ANTB W/OPTIC: CPT | Performed by: EMERGENCY MEDICINE

## 2024-09-06 PROCEDURE — 87491 CHLMYD TRACH DNA AMP PROBE: CPT | Performed by: EMERGENCY MEDICINE

## 2024-09-06 PROCEDURE — 86803 HEPATITIS C AB TEST: CPT | Performed by: EMERGENCY MEDICINE

## 2024-09-06 PROCEDURE — 80053 COMPREHEN METABOLIC PANEL: CPT | Performed by: EMERGENCY MEDICINE

## 2024-09-06 PROCEDURE — 36415 COLL VENOUS BLD VENIPUNCTURE: CPT | Performed by: EMERGENCY MEDICINE

## 2024-09-06 PROCEDURE — 86706 HEP B SURFACE ANTIBODY: CPT | Performed by: EMERGENCY MEDICINE

## 2024-09-06 PROCEDURE — 70496 CT ANGIOGRAPHY HEAD: CPT

## 2024-09-06 PROCEDURE — 86780 TREPONEMA PALLIDUM: CPT | Performed by: EMERGENCY MEDICINE

## 2024-09-06 PROCEDURE — 70498 CT ANGIOGRAPHY NECK: CPT

## 2024-09-06 PROCEDURE — 87340 HEPATITIS B SURFACE AG IA: CPT | Performed by: EMERGENCY MEDICINE

## 2024-09-06 PROCEDURE — 99285 EMERGENCY DEPT VISIT HI MDM: CPT | Performed by: EMERGENCY MEDICINE

## 2024-09-06 PROCEDURE — 81003 URINALYSIS AUTO W/O SCOPE: CPT | Performed by: EMERGENCY MEDICINE

## 2024-09-06 PROCEDURE — 87389 HIV-1 AG W/HIV-1&-2 AB AG IA: CPT | Performed by: EMERGENCY MEDICINE

## 2024-09-06 PROCEDURE — 87591 N.GONORRHOEAE DNA AMP PROB: CPT | Performed by: EMERGENCY MEDICINE

## 2024-09-06 PROCEDURE — 96372 THER/PROPH/DIAG INJ SC/IM: CPT

## 2024-09-06 RX ORDER — METRONIDAZOLE 500 MG/1
500 TABLET ORAL ONCE
Status: COMPLETED | OUTPATIENT
Start: 2024-09-06 | End: 2024-09-06

## 2024-09-06 RX ORDER — ONDANSETRON 4 MG/1
4 TABLET, ORALLY DISINTEGRATING ORAL ONCE AS NEEDED
Status: DISCONTINUED | OUTPATIENT
Start: 2024-09-06 | End: 2024-09-06 | Stop reason: HOSPADM

## 2024-09-06 RX ORDER — BICTEGRAVIR SODIUM, EMTRICITABINE, AND TENOFOVIR ALAFENAMIDE FUMARATE 50; 200; 25 MG/1; MG/1; MG/1
1 TABLET ORAL
Qty: 22 TABLET | Refills: 0 | Status: SHIPPED | OUTPATIENT
Start: 2024-09-12 | End: 2024-10-04

## 2024-09-06 RX ORDER — CEFTRIAXONE 500 MG/1
500 INJECTION, POWDER, FOR SOLUTION INTRAMUSCULAR; INTRAVENOUS ONCE
Status: DISCONTINUED | OUTPATIENT
Start: 2024-09-06 | End: 2024-09-06

## 2024-09-06 RX ORDER — LEVONORGESTREL 1.5 MG/1
1.5 TABLET ORAL ONCE
Status: COMPLETED | OUTPATIENT
Start: 2024-09-06 | End: 2024-09-06

## 2024-09-06 RX ORDER — DOXYCYCLINE 100 MG/1
100 CAPSULE ORAL ONCE
Status: COMPLETED | OUTPATIENT
Start: 2024-09-06 | End: 2024-09-06

## 2024-09-06 RX ORDER — GINSENG 100 MG
1 CAPSULE ORAL ONCE
Status: COMPLETED | OUTPATIENT
Start: 2024-09-06 | End: 2024-09-06

## 2024-09-06 RX ORDER — LEVONORGESTREL 1.5 MG/1
1.5 TABLET ORAL ONCE
Status: CANCELLED | OUTPATIENT
Start: 2024-09-06 | End: 2024-09-06

## 2024-09-06 RX ADMIN — CEFTRIAXONE 500 MG: 1 INJECTION, POWDER, FOR SOLUTION INTRAMUSCULAR; INTRAVENOUS at 05:40

## 2024-09-06 RX ADMIN — Medication 1 EACH: at 05:47

## 2024-09-06 RX ADMIN — IOHEXOL 85 ML: 350 INJECTION, SOLUTION INTRAVENOUS at 04:12

## 2024-09-06 RX ADMIN — BACITRACIN 1 SMALL APPLICATION: 500 OINTMENT TOPICAL at 00:42

## 2024-09-06 RX ADMIN — METRONIDAZOLE 1 BOTTLE: 500 TABLET ORAL at 05:40

## 2024-09-06 RX ADMIN — DOXYCYCLINE 100 MG: 100 CAPSULE ORAL at 05:39

## 2024-09-06 RX ADMIN — DOXYCYCLINE 1 BOTTLE: 100 CAPSULE ORAL at 05:40

## 2024-09-06 RX ADMIN — LEVONORGESTREL 1.5 MG: 1.5 TABLET ORAL at 05:38

## 2024-09-06 RX ADMIN — METRONIDAZOLE 500 MG: 500 TABLET ORAL at 05:39
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
## 2024-09-08 LAB
C TRACH DNA SPEC QL NAA+PROBE: NEGATIVE
N GONORRHOEA DNA SPEC QL NAA+PROBE: NEGATIVE

## 2024-12-03 ENCOUNTER — DOCUMENTATION (OUTPATIENT)
Dept: SURGERY | Facility: CLINIC | Age: 29
End: 2024-12-03
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
## 2025-06-27 ENCOUNTER — NURSE TRIAGE (OUTPATIENT)
Age: 30
End: 2025-06-27

## 2025-06-27 NOTE — TELEPHONE ENCOUNTER
"REASON FOR CONVERSATION: Breast Problem    SYMPTOMS: breast lumps    OTHER HEALTH INFORMATION: Noticed lump behind nipple of left breast about 3 months ago. Recently has noticed one lump in each armpit as well. Lump in breast tender to palpitation.     PROTOCOL DISPOSITION: See Within 3 Days in Office    CARE ADVICE PROVIDED: call with changes    PRACTICE FOLLOW-UP: n/a      Reason for Disposition   Breast lump    Answer Assessment - Initial Assessment Questions  1. SYMPTOM: \"What's the main symptom you're concerned about?\"  (e.g., lump, nipple discharge, pain, rash )      Breast lump  2. LOCATION: \"Where is the lump located?\"      Behind nipple L breast  3. ONSET: \"When did symptoms  start?\"      About 3 months  4. PRIOR HISTORY: \"Do you have any history of prior problems with your breasts?\" (e.g., breast cancer, breast implant, fibrocystic breast disease)       denies  5. CAUSE: \"What do you think is causing this symptom?\"      unsure  6. OTHER SYMPTOMS: \"Do you have any other symptoms?\" (e.g., fever, breast pain, nipple discharge, redness or rash)      denies  7. PREGNANCY-BREASTFEEDING: \"Is there any chance you are pregnant?\" \"When was your last menstrual period?\" \"Are you breastfeeding?\"      denies    Protocols used: Breast Symptoms-Adult-[place]    "

## 2025-07-01 ENCOUNTER — OFFICE VISIT (OUTPATIENT)
Dept: OBGYN CLINIC | Facility: CLINIC | Age: 30
End: 2025-07-01
Payer: COMMERCIAL

## 2025-07-01 VITALS
DIASTOLIC BLOOD PRESSURE: 64 MMHG | WEIGHT: 97 LBS | BODY MASS INDEX: 20.93 KG/M2 | SYSTOLIC BLOOD PRESSURE: 94 MMHG | HEIGHT: 57 IN

## 2025-07-01 DIAGNOSIS — N63.42 SUBAREOLAR MASS OF LEFT BREAST: Primary | ICD-10-CM

## 2025-07-01 PROCEDURE — 99213 OFFICE O/P EST LOW 20 MIN: CPT | Performed by: OBSTETRICS & GYNECOLOGY
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
## 2025-07-09 ENCOUNTER — HOSPITAL ENCOUNTER (OUTPATIENT)
Dept: MAMMOGRAPHY | Facility: CLINIC | Age: 30
Discharge: HOME | End: 2025-07-09
Attending: OBSTETRICS & GYNECOLOGY
Payer: COMMERCIAL

## 2025-07-09 ENCOUNTER — HOSPITAL ENCOUNTER (OUTPATIENT)
Dept: ULTRASOUND IMAGING | Facility: CLINIC | Age: 30
Discharge: HOME | End: 2025-07-09
Attending: OBSTETRICS & GYNECOLOGY
Payer: COMMERCIAL

## 2025-07-09 VITALS
HEIGHT: 57 IN | HEIGHT: 57 IN | BODY MASS INDEX: 20.93 KG/M2 | WEIGHT: 97 LBS | BODY MASS INDEX: 20.93 KG/M2 | WEIGHT: 97 LBS

## 2025-07-09 DIAGNOSIS — N63.42 SUBAREOLAR MASS OF LEFT BREAST: ICD-10-CM

## 2025-07-09 PROCEDURE — 76642 ULTRASOUND BREAST LIMITED: CPT

## 2025-07-09 PROCEDURE — 77066 DX MAMMO INCL CAD BI: CPT

## 2025-07-09 PROCEDURE — G0279 TOMOSYNTHESIS, MAMMO: HCPCS

## (undated) DEVICE — SKIN MARKER DUAL TIP WITH RULER CAP, FLEXIBLE RULER AND LABELS: Brand: DEVON

## (undated) DEVICE — TELFA NON-ADHERENT ABSORBENT DRESSING: Brand: TELFA

## (undated) DEVICE — Device

## (undated) DEVICE — 3M™ STERI-STRIP™ REINFORCED ADHESIVE SKIN CLOSURES, R1546, 1/4 IN X 4 IN (6 MM X 100 MM), 10 STRIPS/ENVELOPE: Brand: 3M™ STERI-STRIP™

## (undated) DEVICE — SUT VICRYL 0 CT-1 27 IN J260H

## (undated) DEVICE — CHLORAPREP HI-LITE 26ML ORANGE

## (undated) DEVICE — 3M™ STERI-STRIP™ REINFORCED ADHESIVE SKIN CLOSURES, R1547, 1/2 IN X 4 IN (12 MM X 100 MM), 6 STRIPS/ENVELOPE: Brand: 3M™ STERI-STRIP™

## (undated) DEVICE — ABDOMINAL PAD: Brand: DERMACEA

## (undated) DEVICE — GLOVE PI ULTRA TOUCH SZ.6.5

## (undated) DEVICE — PACK C-SECTION PBDS

## (undated) DEVICE — DECANTER: Brand: UNBRANDED

## (undated) DEVICE — 3M™ STERI-STRIP™ COMPOUND BENZOIN TINCTURE 40 BAGS/CARTON 4 CARTONS/CASE C1544: Brand: 3M™ STERI-STRIP™

## (undated) DEVICE — STAPLER INSORB SUBCUTICULAR 30 SINGLE USE